# Patient Record
Sex: FEMALE | Race: WHITE | NOT HISPANIC OR LATINO | Employment: FULL TIME | URBAN - METROPOLITAN AREA
[De-identification: names, ages, dates, MRNs, and addresses within clinical notes are randomized per-mention and may not be internally consistent; named-entity substitution may affect disease eponyms.]

---

## 2018-03-12 RX ORDER — LEVOTHYROXINE SODIUM 100 UG/1
TABLET ORAL
Qty: 90 TABLET | Refills: 3 | Status: SHIPPED | OUTPATIENT
Start: 2018-03-12 | End: 2019-04-15 | Stop reason: SDUPTHER

## 2018-04-06 ENCOUNTER — TELEPHONE (OUTPATIENT)
Dept: CARDIOLOGY | Facility: CLINIC | Age: 68
End: 2018-04-06

## 2018-04-06 RX ORDER — BUDESONIDE 0.5 MG/2ML
INHALANT ORAL
COMMUNITY
Start: 2017-10-19 | End: 2020-01-24

## 2018-04-06 RX ORDER — LANOLIN ALCOHOL/MO/W.PET/CERES
500 CREAM (GRAM) TOPICAL DAILY
COMMUNITY
Start: 2016-12-29 | End: 2019-10-23

## 2018-04-06 RX ORDER — FAMOTIDINE 20 MG/1
20 TABLET, FILM COATED ORAL
COMMUNITY
Start: 2018-02-02 | End: 2019-01-25 | Stop reason: SDUPTHER

## 2018-04-06 RX ORDER — OMEGA-3S/DHA/EPA/FISH OIL/D3 300MG-1000
2000 CAPSULE ORAL DAILY
COMMUNITY
Start: 2016-11-07

## 2018-04-06 RX ORDER — ATORVASTATIN CALCIUM 10 MG/1
10 TABLET, FILM COATED ORAL
COMMUNITY
Start: 2017-12-29 | End: 2018-12-29 | Stop reason: SDUPTHER

## 2018-04-06 RX ORDER — IPRATROPIUM BROMIDE 21 UG/1
0.03 SPRAY, METERED NASAL
COMMUNITY
Start: 2015-03-31 | End: 2018-04-13 | Stop reason: ALTCHOICE

## 2018-04-06 RX ORDER — MONTELUKAST SODIUM 10 MG/1
10 TABLET ORAL
COMMUNITY
Start: 2017-10-11 | End: 2018-11-19 | Stop reason: SDUPTHER

## 2018-04-06 RX ORDER — ALBUTEROL SULFATE 90 UG/1
INHALANT RESPIRATORY (INHALATION)
COMMUNITY
Start: 2017-09-15 | End: 2018-04-13 | Stop reason: ALTCHOICE

## 2018-04-06 RX ORDER — CLONAZEPAM 1 MG/1
1 TABLET ORAL
COMMUNITY
Start: 2017-12-29 | End: 2018-09-05 | Stop reason: SDUPTHER

## 2018-04-06 RX ORDER — EZETIMIBE 10 MG/1
10 TABLET ORAL
COMMUNITY
Start: 2016-12-29 | End: 2019-02-06 | Stop reason: SDUPTHER

## 2018-04-06 RX ORDER — ALBUTEROL SULFATE 0.83 MG/ML
SOLUTION RESPIRATORY (INHALATION)
COMMUNITY
Start: 2017-10-19 | End: 2023-06-28

## 2018-04-06 RX ORDER — LEVOTHYROXINE SODIUM 100 UG/1
CAPSULE ORAL
COMMUNITY
Start: 2017-05-23 | End: 2018-04-13 | Stop reason: ALTCHOICE

## 2018-04-06 RX ORDER — VERAPAMIL HYDROCHLORIDE 40 MG/1
40 TABLET ORAL
COMMUNITY
Start: 2017-08-15 | End: 2018-04-13 | Stop reason: ALTCHOICE

## 2018-04-06 RX ORDER — VERAPAMIL HYDROCHLORIDE 240 MG/1
240 CAPSULE, DELAYED RELEASE ORAL
COMMUNITY
Start: 2017-08-15 | End: 2019-05-23 | Stop reason: SDUPTHER

## 2018-04-06 RX ORDER — ALBUTEROL SULFATE 90 UG/1
INHALANT RESPIRATORY (INHALATION)
COMMUNITY
Start: 2016-05-05 | End: 2019-02-06 | Stop reason: SDUPTHER

## 2018-04-06 NOTE — TELEPHONE ENCOUNTER
Pt requesting appt at [facility] loc  First avail in June.   Pt requested sooner.   Thank you!

## 2018-04-13 ENCOUNTER — OFFICE VISIT (OUTPATIENT)
Dept: FAMILY MEDICINE | Facility: CLINIC | Age: 68
End: 2018-04-13
Payer: MEDICARE

## 2018-04-13 VITALS
RESPIRATION RATE: 18 BRPM | HEART RATE: 77 BPM | OXYGEN SATURATION: 96 % | DIASTOLIC BLOOD PRESSURE: 70 MMHG | WEIGHT: 138 LBS | SYSTOLIC BLOOD PRESSURE: 110 MMHG | BODY MASS INDEX: 26.06 KG/M2 | HEIGHT: 61 IN

## 2018-04-13 DIAGNOSIS — E78.5 DYSLIPIDEMIA: ICD-10-CM

## 2018-04-13 DIAGNOSIS — E03.9 ACQUIRED HYPOTHYROIDISM: ICD-10-CM

## 2018-04-13 DIAGNOSIS — Z00.00 MEDICARE ANNUAL WELLNESS VISIT, SUBSEQUENT: Primary | ICD-10-CM

## 2018-04-13 DIAGNOSIS — M81.0 AGE-RELATED OSTEOPOROSIS WITHOUT CURRENT PATHOLOGICAL FRACTURE: ICD-10-CM

## 2018-04-13 DIAGNOSIS — E72.11 HOMOCYSTINURIA (CMS/HCC): ICD-10-CM

## 2018-04-13 DIAGNOSIS — I10 ESSENTIAL HYPERTENSION: ICD-10-CM

## 2018-04-13 DIAGNOSIS — M25.50 RECURRENT JOINT PAIN: ICD-10-CM

## 2018-04-13 DIAGNOSIS — I47.10 SUPRAVENTRICULAR TACHYCARDIA (CMS/HCC): ICD-10-CM

## 2018-04-13 PROCEDURE — 93000 ELECTROCARDIOGRAM COMPLETE: CPT | Performed by: INTERNAL MEDICINE

## 2018-04-13 PROCEDURE — G0009 ADMIN PNEUMOCOCCAL VACCINE: HCPCS | Performed by: INTERNAL MEDICINE

## 2018-04-13 PROCEDURE — G0438 PPPS, INITIAL VISIT: HCPCS | Performed by: INTERNAL MEDICINE

## 2018-04-13 PROCEDURE — 99214 OFFICE O/P EST MOD 30 MIN: CPT | Mod: 25 | Performed by: INTERNAL MEDICINE

## 2018-04-13 PROCEDURE — 90732 PPSV23 VACC 2 YRS+ SUBQ/IM: CPT | Performed by: INTERNAL MEDICINE

## 2018-04-13 ASSESSMENT — ENCOUNTER SYMPTOMS
CARDIOVASCULAR NEGATIVE: 1
MUSCULOSKELETAL NEGATIVE: 1
ABDOMINAL PAIN: 0
WOUND: 0
VOMITING: 0
HEMATOLOGIC/LYMPHATIC NEGATIVE: 1
NAUSEA: 0
TROUBLE SWALLOWING: 0
SORE THROAT: 0
COUGH: 1
DIARRHEA: 0
FLANK PAIN: 0
DECREASED CONCENTRATION: 0
CONSTITUTIONAL NEGATIVE: 1
CONSTIPATION: 0
FREQUENCY: 0
DYSPHORIC MOOD: 0
EYE REDNESS: 0
DYSURIA: 0
NEUROLOGICAL NEGATIVE: 1
AGITATION: 0
SLEEP DISTURBANCE: 0

## 2018-04-13 ASSESSMENT — ACTIVITIES OF DAILY LIVING (ADL)
PATIENT'S MEMORY ADEQUATE TO SAFELY COMPLETE DAILY ACTIVITIES?: YES
ADEQUATE_TO_COMPLETE_ADL: YES

## 2018-04-13 NOTE — PROGRESS NOTES
"  Subjective     Patient ID: Kelly Meeks is a 67 y.o. female.    She is here for follow up of chronic conditions - including hyperlipidemia, allergies, hx of SVT        Review of Systems   Constitutional: Negative.    HENT: Negative for ear pain, nosebleeds, sore throat, tinnitus and trouble swallowing.    Eyes: Negative for redness and visual disturbance.   Respiratory: Positive for cough.    Cardiovascular: Negative.    Gastrointestinal: Negative for abdominal pain, constipation, diarrhea, nausea and vomiting.   Endocrine: Negative for cold intolerance and heat intolerance.   Genitourinary: Negative for dysuria, flank pain, frequency and urgency.   Musculoskeletal: Negative.    Skin: Negative for rash and wound.   Allergic/Immunologic: Positive for environmental allergies and food allergies.   Neurological: Negative.    Hematological: Negative.    Psychiatric/Behavioral: Negative for agitation, decreased concentration, dysphoric mood and sleep disturbance.       Objective     Vitals:    04/13/18 0827   BP: 110/70   Patient Position: Sitting   Pulse: 77   Resp: 18   SpO2: 96%   Weight: 62.6 kg (138 lb)   Height: 1.549 m (5' 1\")     Body mass index is 26.07 kg/m².    Physical Exam   Constitutional: She is oriented to person, place, and time. She appears well-developed. No distress.   She is here today for a complete physical   HENT:   Head: Normocephalic.   Nose: Nose normal.   Mouth/Throat: Oropharynx is clear and moist.   Eyes: Conjunctivae and EOM are normal. Pupils are equal, round, and reactive to light.   Neck: Normal range of motion. No JVD present. No thyromegaly present.   Cardiovascular: Normal rate, regular rhythm, normal heart sounds and intact distal pulses.    No murmur heard.  Pulmonary/Chest: Effort normal and breath sounds normal. No stridor. No respiratory distress. She has no wheezes. Right breast exhibits no inverted nipple, no mass, no nipple discharge, no skin change and no tenderness. Left " breast exhibits no inverted nipple, no mass, no nipple discharge, no skin change and no tenderness.   Abdominal: Soft. Bowel sounds are normal. She exhibits no distension and no mass. There is no tenderness. No hernia.   Musculoskeletal: Normal range of motion. She exhibits no edema or deformity.   Lymphadenopathy:     She has no cervical adenopathy.   Neurological: She is alert and oriented to person, place, and time. No cranial nerve deficit. Coordination normal.   Skin: Skin is warm and dry. Capillary refill takes less than 2 seconds. No rash noted.   Psychiatric: She has a normal mood and affect. Her behavior is normal. Judgment and thought content normal.       Assessment/Plan   Problem List Items Addressed This Visit     Dyslipidemia    Supraventricular tachycardia (CMS/HCC) (HCC)     Well controlled on current medications  EKG done today shows NSR    She has been taking Clonazepam to prevent the morning symptoms  Given that she is now asymptomatic I encouraged her to try and taper off of it          Relevant Orders    ECG 12 lead (Completed)    Vitamin B12    Comprehensive metabolic panel    Urinalysis with Reflex Culture    Hypothyroidism    Relevant Orders    TSH w reflex FT4      Other Visit Diagnoses     Medicare annual wellness visit, subsequent    -  Primary    Age-related osteoporosis without current pathological fracture        Relevant Orders    DEXA BONE DENSITY    Essential hypertension        Relevant Orders    CBC and Differential    Comprehensive metabolic panel    TSH w reflex FT4    Recurrent joint pain        Homocystinuria (CMS/HCC) (HCC)         Relevant Orders    Vitamin B12

## 2018-04-13 NOTE — ASSESSMENT & PLAN NOTE
Well controlled on current medications  EKG done today shows NSR    She has been taking Clonazepam to prevent the morning symptoms  Given that she is now asymptomatic I encouraged her to try and taper off of it

## 2018-05-01 ENCOUNTER — TRANSCRIBE ORDERS (OUTPATIENT)
Dept: SCHEDULING | Facility: REHABILITATION | Age: 68
End: 2018-05-01

## 2018-05-01 DIAGNOSIS — M67.951 TENDINOPATHY OF RIGHT GLUTEUS MEDIUS: ICD-10-CM

## 2018-05-01 DIAGNOSIS — M67.952 TENDINOPATHY OF LEFT GLUTEUS MEDIUS: Primary | ICD-10-CM

## 2018-05-01 DIAGNOSIS — M22.2X9 DISORDER OF PATELLOFEMORAL JOINT, UNSPECIFIED LATERALITY: ICD-10-CM

## 2018-05-24 RX ORDER — VERAPAMIL HCL 240 MG
240 TABLET, EXTENDED RELEASE ORAL DAILY
Qty: 90 TABLET | Refills: 3 | Status: SHIPPED | OUTPATIENT
Start: 2018-05-24 | End: 2019-04-02

## 2018-09-05 RX ORDER — CLONAZEPAM 1 MG/1
TABLET ORAL
Qty: 90 TABLET | Refills: 2 | Status: SHIPPED | OUTPATIENT
Start: 2018-09-05 | End: 2019-03-14 | Stop reason: SDUPTHER

## 2018-11-19 RX ORDER — MONTELUKAST SODIUM 10 MG/1
10 TABLET ORAL NIGHTLY
Qty: 90 TABLET | Refills: 3 | Status: SHIPPED | OUTPATIENT
Start: 2018-11-19 | End: 2019-11-04 | Stop reason: SDUPTHER

## 2018-12-03 RX ORDER — VERAPAMIL HYDROCHLORIDE 40 MG/1
TABLET ORAL
Qty: 90 TABLET | Refills: 3 | Status: SHIPPED | OUTPATIENT
Start: 2018-12-03 | End: 2020-08-17

## 2018-12-31 RX ORDER — ATORVASTATIN CALCIUM 10 MG/1
TABLET, FILM COATED ORAL
Qty: 90 TABLET | Refills: 3 | Status: SHIPPED | OUTPATIENT
Start: 2018-12-31 | End: 2019-10-23

## 2019-01-25 ENCOUNTER — TELEPHONE (OUTPATIENT)
Dept: CARDIOLOGY | Facility: CLINIC | Age: 69
End: 2019-01-25

## 2019-01-25 ENCOUNTER — TELEPHONE (OUTPATIENT)
Dept: SCHEDULING | Facility: CLINIC | Age: 69
End: 2019-01-25

## 2019-01-25 NOTE — TELEPHONE ENCOUNTER
Patient of Dr Rosado Pharmacist from [facility] calling. Requesting refill zetia. Patient last seen in office 12/29/16 Patient has seen pcp so will ask PCP to refill zetia Thank you    Instructed pharmacist to call back if cannot get refilled PCP thank you

## 2019-01-25 NOTE — TELEPHONE ENCOUNTER
Pt calling to make a follow up apt.  Pt needs to be seen to get refills on her medication.   [phone]

## 2019-01-28 RX ORDER — FAMOTIDINE 20 MG/1
TABLET, FILM COATED ORAL
Qty: 90 TABLET | Refills: 3 | Status: SHIPPED | OUTPATIENT
Start: 2019-01-28 | End: 2019-11-04 | Stop reason: SDUPTHER

## 2019-01-31 RX ORDER — EZETIMIBE 10 MG/1
10 TABLET ORAL
OUTPATIENT
Start: 2019-01-31

## 2019-01-31 NOTE — TELEPHONE ENCOUNTER
Dr. Saucedo patient.    Patient last seen in 12/2016 and she is out of Zetia.  Chart review reflects that patient requested a refill on her Zetia 1/25/19 and she was directed to contact her PCP for refill since she hasn't been seen in over 2 years. Patient did not follow through with recommendation to ask her PCP to refill her Zetia. I encouraged her to do so however she is not satisfied with that response. She feels Dr. Saucedo should refill this medication since he was the initial prescriber. I told her I would route this note to you to see if you're willing to refill this. She does have an appointment scheduled but it's not until 4/2/19. She asked about lab work prior to her visit but it appears this was addressed in a 1/25/19 telephone note-lab slip mailed on 1/28/19 by Madelyn Mullins.  Patient requesting a return call at [phone], thanks.

## 2019-02-01 NOTE — TELEPHONE ENCOUNTER
Pt called back to speak with Fernanda and pt also wants to speak with Dr. Saucedo     Pt can be reached at [phone]

## 2019-02-06 ENCOUNTER — TELEPHONE (OUTPATIENT)
Dept: SCHEDULING | Facility: CLINIC | Age: 69
End: 2019-02-06

## 2019-02-06 ENCOUNTER — OFFICE VISIT (OUTPATIENT)
Dept: FAMILY MEDICINE | Facility: CLINIC | Age: 69
End: 2019-02-06
Payer: MEDICARE

## 2019-02-06 VITALS
HEART RATE: 76 BPM | TEMPERATURE: 98.4 F | WEIGHT: 142.4 LBS | OXYGEN SATURATION: 97 % | BODY MASS INDEX: 26.91 KG/M2 | DIASTOLIC BLOOD PRESSURE: 80 MMHG | SYSTOLIC BLOOD PRESSURE: 112 MMHG

## 2019-02-06 DIAGNOSIS — R21 RASH: Primary | ICD-10-CM

## 2019-02-06 DIAGNOSIS — F41.9 ANXIETY: ICD-10-CM

## 2019-02-06 DIAGNOSIS — D22.9 ATYPICAL NEVUS: ICD-10-CM

## 2019-02-06 PROCEDURE — 99214 OFFICE O/P EST MOD 30 MIN: CPT | Performed by: INTERNAL MEDICINE

## 2019-02-06 RX ORDER — EZETIMIBE 10 MG/1
10 TABLET ORAL DAILY
Qty: 90 TABLET | Refills: 3 | Status: SHIPPED | OUTPATIENT
Start: 2019-02-06 | End: 2019-04-02 | Stop reason: SDUPTHER

## 2019-02-06 RX ORDER — CLOBETASOL PROPIONATE 0.5 MG/G
AEROSOL, FOAM TOPICAL 2 TIMES DAILY
Qty: 50 G | Refills: 3 | Status: SHIPPED | OUTPATIENT
Start: 2019-02-06 | End: 2019-10-23

## 2019-02-06 RX ORDER — ALBUTEROL SULFATE 90 UG/1
2 INHALANT RESPIRATORY (INHALATION) EVERY 6 HOURS PRN
Qty: 1 INHALER | Refills: 3 | Status: SHIPPED | OUTPATIENT
Start: 2019-02-06 | End: 2019-11-04 | Stop reason: SDUPTHER

## 2019-02-06 ASSESSMENT — ENCOUNTER SYMPTOMS
UNEXPECTED WEIGHT CHANGE: 1
FATIGUE: 1

## 2019-02-06 ASSESSMENT — PAIN SCALES - GENERAL: PAINLEVEL: 0-NO PAIN

## 2019-02-06 NOTE — ASSESSMENT & PLAN NOTE
"She is trying to decrease the Clonazepam dose- encouraged her to pursue as a likely cause of her \"brain fog\"   "

## 2019-02-06 NOTE — PROGRESS NOTES
"  Subjective     Patient ID: Kelly Meeks is a 68 y.o. female.    She is here with several concerns including a rash and itching on her back  She has some \"brain fog\" and has been on a keto diet and has gained weight   Has GI symptoms         Review of Systems   Constitutional: Positive for fatigue and unexpected weight change.        \"brain fog\"   Gastrointestinal:        Flatulance   Skin:        Tender scalp       Objective     Vitals:    02/06/19 1546   BP: 112/80   BP Location: Left upper arm   Patient Position: Sitting   Pulse: 76   Temp: 36.9 °C (98.4 °F)   SpO2: 97%   Weight: 64.6 kg (142 lb 6.4 oz)     Body mass index is 26.91 kg/m².    Physical Exam   Constitutional: She is oriented to person, place, and time. She appears well-developed. No distress.   HENT:   Head: Normocephalic.   Mouth/Throat: Oropharynx is clear and moist.   Eyes: Conjunctivae are normal. No scleral icterus.   Neck: Normal range of motion. No thyromegaly present.   Cardiovascular: Normal rate, regular rhythm and normal heart sounds.    No murmur heard.  Pulmonary/Chest: Effort normal and breath sounds normal. She has no wheezes.   Musculoskeletal: She exhibits no edema.   Lymphadenopathy:     She has no cervical adenopathy.   Neurological: She is alert and oriented to person, place, and time.   Skin: Skin is warm and dry. Rash noted.        Psychiatric: She has a normal mood and affect. Her behavior is normal.   Nursing note and vitals reviewed.      Assessment/Plan   Problem List Items Addressed This Visit     Anxiety     She is trying to decrease the Clonazepam dose- encouraged her to pursue as a likely cause of her \"brain fog\"            Other Visit Diagnoses     Rash    -  Primary    reassured, sent in steroid foam, advised to go to derm for atypical nevus     Relevant Medications    clobetasol (OLUX) 0.05 % topical foam    Atypical nevus        Relevant Medications    clobetasol (OLUX) 0.05 % topical foam            "

## 2019-02-07 DIAGNOSIS — E78.5 DYSLIPIDEMIA: Primary | ICD-10-CM

## 2019-03-14 RX ORDER — CLONAZEPAM 1 MG/1
TABLET ORAL
Qty: 90 TABLET | Refills: 2 | Status: SHIPPED | OUTPATIENT
Start: 2019-03-14 | End: 2019-12-30 | Stop reason: SDUPTHER

## 2019-03-28 ENCOUNTER — TELEPHONE (OUTPATIENT)
Dept: FAMILY MEDICINE | Facility: CLINIC | Age: 69
End: 2019-03-28

## 2019-03-28 DIAGNOSIS — Z78.9 HISTORY OF MEASLES, MUMPS, RUBELLA (MMR) VACCINATION UNKNOWN: Primary | ICD-10-CM

## 2019-03-28 NOTE — TELEPHONE ENCOUNTER
please advise if you would recommend pt just getting the booster or if you would advise having titers to check for immunity. Pt ask to have your recommendations before proceeding with anything.     Tks!

## 2019-03-28 NOTE — TELEPHONE ENCOUNTER
Patient stated she is concerned about getting the mumps again, she had them as a child.  She doesn't believe she ever received the vaccine. Patient requested the injection rather than the titer.  Patient asked if this is what you would recommend.  Please advise and I will inform patient. [facility]  [phone]

## 2019-04-01 NOTE — PROGRESS NOTES
Patient MRN: [id number]  Date: 2016  Description: Prog Notes ([facility]) Cardiology  Category: Progress Notes ([facility])   Provider ID: [id number]  Practice ID: [id number]  Bulan ID: [id number]    2019      Melonie Guy M.D.  [address]  [address]  [place] [address]    Re:  ARABELLA MEEKS  :  1950    Dear Melonie:    It was my pleasure to see your patient, Arabella Meeks, in the [facility] today.  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    Clinically, she has been doing well.  She denies any chest pain, shortness of breath or palpitations.    As you know, she has undergone previous cardiac workup.  Her coronary calcium score was 0 in May 2016.  She did have a moderate amount of calcification involving in the thoracic aorta as well as the carotid distribution.  Given her family history of a father who had a myocardial infarction in his 30's and a mother who had a stroke at age 72, we elected to begin primary prevention therapy.    Her initial lipid panel revealed a total cholesterol of 228, , HDL 62 and triglycerides 102.  She initiated on combination therapy with atorvastatin 10 mg alternating with Zetia 10 mg.  Her total cholesterol is down to 208, , apoB 99, LDL-P 1197, a reasonable response to this therapy.  Of note, her LP(a) was elevated at 420.  This prompted a discussion and we will begin niacin with Endur-Acin 500 mg at the evening meal.    Over the past year the patient has been on the keto diet.  She is markedly increased her saturated fats.  Her repeat lipid panel reflects this increased saturated fat intake.  Total cholesterol 218  HDL 73 triglycerides 73 APO B 121 and LDL P 1568.  I did a lengthy discussion today concerning cutting back on her saturated fat intake.  She needs to follow a low carbohydrate low saturated fat Mediterranean diet and increase her exercise program.  She is agreeable to  this nutrition recommendation.    At this time recommending that she undergo a repeat coronary calcium score.    PAST MEDICAL HISTORY:    Coronary calcium score 0, May 2016.    Stress echo in 2009 was a normal study.    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    PAST SURGICAL HISTORY:  None.    CURRENT MEDICATIONS:      Current Outpatient Prescriptions:   •  albuterol 2.5 mg /3 mL (0.083 %) nebulizer solution, inhale 3 milliliter by nebulization route 3 times every day, Disp: , Rfl:   •  albuterol HFA (PROAIR HFA) 90 mcg/actuation inhaler, Inhale 2 puffs every 6 (six) hours as needed for wheezing., Disp: 1 Inhaler, Rfl: 3  •  atorvastatin (LIPITOR) 10 mg tablet, TAKE ONE TABLET BY MOUTH DAILY (Patient taking differently: TAKE ONE TABLET BY MOUTH    # times per week), Disp: 90 tablet, Rfl: 3  •  budesonide (PULMICORT) 0.5 mg/2 mL nebulizer solution, inhale 2 milliliter by nebulization route 3 times every day, Disp: , Rfl:   •  cholecalciferol, vitamin D3, (VITAMIN D3) 2,000 unit tablet, No SIG Entered, Disp: , Rfl:   •  clobetasol (OLUX) 0.05 % topical foam, Apply topically 2 (two) times a day., Disp: 50 g, Rfl: 3  •  clonazePAM (klonoPIN) 1 mg tablet, TAKE ONE TABLET BY MOUTH DAILY, Disp: 90 tablet, Rfl: 2  •  DOCOSAHEXANOIC ACID/EPA (FISH OIL ORAL), take  atotal 3,000  daily, Disp: , Rfl:   •  estrogens, conjugated (CONJUGATED ESTROGENS VAGL), Insert into the vagina., Disp: , Rfl:   •  ezetimibe (ZETIA) 10 mg tablet, Take 1 tablet (10 mg total) by mouth daily. (Patient taking differently: Take 10 mg by mouth 3 (three) times a week (Mon, Wed, Fri).  ), Disp: 90 tablet, Rfl: 3  •  levothyroxine (SYNTHROID) 100 mcg tablet, TAKE ONE TABLET BY MOUTH EVERY DAY, Disp: 90 tablet, Rfl: 3  •  melatonin 5 mg capsule, Take by mouth., Disp: , Rfl:   •  montelukast  (SINGULAIR) 10 mg tablet, Take 1 tablet (10 mg total) by mouth nightly., Disp: 90 tablet, Rfl: 3  •  niacin (ENDUR-ACIN) 500 mg CR tablet, 500 mg., Disp: , Rfl:   •  verapamil (CALAN) 40 mg tablet, TAKE ONE TABLET BY MOUTH ONCE DAILY WHEN NEEDED, Disp: 90 tablet, Rfl: 3  •  verapamil (VERELAN) 240 mg 24 hr capsule, 240 mg., Disp: , Rfl:   •  famotidine (PEPCID) 20 mg tablet, TAKE ONE TABLET BY MOUTH DAILY (Patient not taking: Reported on 4/2/2019), Disp: 90 tablet, Rfl: 3  •  verapamil SR (CALAN-SR) 240 mg CR tablet, Take 1 tablet (240 mg total) by mouth daily. (Patient not taking: Reported on 4/2/2019 ), Disp: 90 tablet, Rfl: 3      SUPPLEMENTS:  Omega-3 fish oil, vitamin-D, coenzyme-Q10, Reservitol, curcumin, per Dr. Adrian Yang's recommendations, Endur-Acin 500 mg at dinner.    ALLERGIES:  High-dose statins.  Lipitor 20 mg daily caused significant myalgias.  We are going to attempt reintroducing 10 mg Monday, Wednesday, Friday.    FAMILY HISTORY:  Father had multiple myocardial infarctions in his 30's.  Mother with possible coronary artery disease and stroke.  Brother with hypertension.    SOCIAL HISTORY:  The patient lives with her adopted daughter.  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  Occasional glass of wine.  She quit smoking 15 years ago.    REVIEW OF SYSTEMS:  The patient's palpitations have been well controlled with the use of Verelan.  She follows with Dr. Ofelia Muse.  She has had difficulty with taking high-dose statins as they lead to symptoms of myalgias and leg pain.  She has a history of asthma and allergies.    PHYSICAL EXAMINATION:  The patient is a middle-aged female in no acute distress.    Weight:  143.  Blood pressure: 98/78  Heart rate:  70.  Temperature: Afebrile.  HEENT:  Unremarkable.  No xanthelasma or arcus.  Neck:  Supple, no JVD.  Lungs:  Clear to auscultation and percussion.  Cardiac:  Regular rate and rhythm without  murmur or gallop.  Abdomen:  Soft, bowel sounds present, no organomegaly.  Extremities:  No edema, pulses intact.  Skin:  Warm and dry.  Neuro:  Alert and oriented X 3.    LABORATORY DATA:      Coronary score:  0, 2016, thoracic aortic calcification.      Advanced lipid panel March 2019:  Total cholesterol 218  HDL 73 triglycerides 73 APO B 121.  LP(a) 473.  Inflammation panel: Normal  Endothelial function panel: Abnormal  Metabolic panel: Vitamin D 69  Sterol panel: Hyper absorber of cholesterol.  Normal synthesizer of cholesterol.  Diabetes panel: Insulin resistance  Omega-3 index: 9.1    IMPRESSIONS/RECOMMENDATIONS:  1. Cardiovascular risk assessment.  The patient's coronary calcium score is 0 which places her in a low risk category.  We will repeat her calcium score this year.  2. Thoracic aortic and carotid arterial calcification.  The patient is at risk for progressive atherosclerosis.  3. Polygenic hypercholesterolemia.  The patient will continue Lipitor 10 mg Monday, Wednesday and Friday and Zetia 10 mg on alternating days.  Our goal is an LDL cholesterol less than 100.  He will return back to a low carbohydrate low saturated fat Mediterranean diet.  She was no longer follow the keto diet.  4. Elevated LP(a).  The patient will begin Endur-Acin 500 mg at dinner.  5. Vitamin-D deficiency.  Continue vitamin-D supplementation.  6. Statin intolerance.  7. Hyperabsorber of cholesterol.  Continue Zetia.  8. Hypothyroidism.  Continue Synthroid.    Summary: We will see Kelly back in the office in six months.  She will discontinue her keto diet.  She needs to think about intermittent fasting to be used in conjunction with a low carbohydrate low saturated fat Mediterranean diet.    Sincerely,    Nilson Saucedo MD  4/2/2019      cc: Mleonie Guy M.D., [address] [place] [address], [address], [place] [address], FAX: [phone]

## 2019-04-02 ENCOUNTER — OFFICE VISIT (OUTPATIENT)
Dept: CARDIOLOGY | Facility: CLINIC | Age: 69
End: 2019-04-02
Payer: MEDICARE

## 2019-04-02 VITALS
BODY MASS INDEX: 26.31 KG/M2 | DIASTOLIC BLOOD PRESSURE: 78 MMHG | WEIGHT: 143 LBS | HEIGHT: 62 IN | SYSTOLIC BLOOD PRESSURE: 98 MMHG

## 2019-04-02 DIAGNOSIS — F41.9 ANXIETY: ICD-10-CM

## 2019-04-02 DIAGNOSIS — E78.5 DYSLIPIDEMIA: ICD-10-CM

## 2019-04-02 DIAGNOSIS — E78.00 PURE HYPERCHOLESTEROLEMIA: ICD-10-CM

## 2019-04-02 DIAGNOSIS — I25.10 CORONARY ARTERY DISEASE INVOLVING NATIVE CORONARY ARTERY OF NATIVE HEART WITHOUT ANGINA PECTORIS: Primary | ICD-10-CM

## 2019-04-02 PROCEDURE — 99214 OFFICE O/P EST MOD 30 MIN: CPT | Performed by: INTERNAL MEDICINE

## 2019-04-02 PROCEDURE — 93000 ELECTROCARDIOGRAM COMPLETE: CPT | Performed by: INTERNAL MEDICINE

## 2019-04-02 RX ORDER — EZETIMIBE 10 MG/1
10 TABLET ORAL 3 TIMES WEEKLY
Qty: 40 TABLET | Refills: 1 | Status: SHIPPED | OUTPATIENT
Start: 2019-04-03 | End: 2019-11-04 | Stop reason: SDUPTHER

## 2019-04-02 RX ORDER — ACETAMINOPHEN 500 MG
TABLET ORAL NIGHTLY
COMMUNITY
End: 2022-06-01

## 2019-04-02 NOTE — LETTER
2019     Melonie Guy MD  [address]  [address]  [facility] [address]    Patient: Kelly Meeks   YOB: 1950   Date of Visit: 2019       Dear Dr. Guy:    Thank you for referring Kelly Meeks to me for evaluation. Below are my notes for this consultation.    If you have questions, please do not hesitate to call me. I look forward to following your patient along with you.         Sincerely,        Nilson Saucedo MD        CC: No Recipients  Leana Evangelista CRNP  2019  3:27 PM  Sign at close encounter  Patient MRN: [id number]  Date: 2016  Description: Prog Notes ([facility]) Cardiology  Category: Progress Notes ([facility])   Provider ID: [id number]  Practice ID: 0001  [place] ID: [id number]    2019      Melonie Guy M.D.  [address] [facility]  [address]  [place] [address]    Re:  KELLY MEEKS  :  1950    Dear Melonie:    It was my pleasure to see your patient, Kelly Meeks, in the [facility] today.  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    Clinically, she has been doing well.  She denies any chest pain, shortness of breath or palpitations.    As you know, she has undergone previous cardiac workup.  Her coronary calcium score was 0 in May 2016.  She did have a moderate amount of calcification involving in the thoracic aorta as well as the carotid distribution.  Given her family history of a father who had a myocardial infarction in his 30's and a mother who had a stroke at age 72, we elected to begin primary prevention therapy.    Her initial lipid panel revealed a total cholesterol of 228, , HDL 62 and triglycerides 102.  She initiated on combination therapy with atorvastatin 10 mg alternating with Zetia 10 mg.  Her total cholesterol is down to 208, , apoB 99, LDL-P 1197, a reasonable response to this therapy.  Of note, her LP(a) was elevated at 420.  This prompted a  discussion and we will begin niacin with Endur-Acin 500 mg at the evening meal.    Over the past year the patient has been on the keto diet.  She is markedly increased her saturated fats.  Her repeat lipid panel reflects this increased saturated fat intake.  Total cholesterol 218  HDL 73 triglycerides 73 APO B 121 and LDL P 1568.  I did a lengthy discussion today concerning cutting back on her saturated fat intake.  She needs to follow a low carbohydrate low saturated fat Mediterranean diet and increase her exercise program.  She is agreeable to this nutrition recommendation.    At this time recommending that she undergo a repeat coronary calcium score.    PAST MEDICAL HISTORY:    Coronary calcium score 0, May 2016.    Stress echo in 2009 was a normal study.    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    PAST SURGICAL HISTORY:  None.    CURRENT MEDICATIONS:      Current Outpatient Prescriptions:   •  albuterol 2.5 mg /3 mL (0.083 %) nebulizer solution, inhale 3 milliliter by nebulization route 3 times every day, Disp: , Rfl:   •  albuterol HFA (PROAIR HFA) 90 mcg/actuation inhaler, Inhale 2 puffs every 6 (six) hours as needed for wheezing., Disp: 1 Inhaler, Rfl: 3  •  atorvastatin (LIPITOR) 10 mg tablet, TAKE ONE TABLET BY MOUTH DAILY (Patient taking differently: TAKE ONE TABLET BY MOUTH    # times per week), Disp: 90 tablet, Rfl: 3  •  budesonide (PULMICORT) 0.5 mg/2 mL nebulizer solution, inhale 2 milliliter by nebulization route 3 times every day, Disp: , Rfl:   •  cholecalciferol, vitamin D3, (VITAMIN D3) 2,000 unit tablet, No SIG Entered, Disp: , Rfl:   •  clobetasol (OLUX) 0.05 % topical foam, Apply topically 2 (two) times a day., Disp: 50 g, Rfl: 3  •  clonazePAM (klonoPIN) 1 mg tablet, TAKE ONE TABLET BY MOUTH DAILY, Disp: 90 tablet, Rfl:  2  •  DOCOSAHEXANOIC ACID/EPA (FISH OIL ORAL), take  atotal 3,000  daily, Disp: , Rfl:   •  estrogens, conjugated (CONJUGATED ESTROGENS VAGL), Insert into the vagina., Disp: , Rfl:   •  ezetimibe (ZETIA) 10 mg tablet, Take 1 tablet (10 mg total) by mouth daily. (Patient taking differently: Take 10 mg by mouth 3 (three) times a week (Mon, Wed, Fri).  ), Disp: 90 tablet, Rfl: 3  •  levothyroxine (SYNTHROID) 100 mcg tablet, TAKE ONE TABLET BY MOUTH EVERY DAY, Disp: 90 tablet, Rfl: 3  •  melatonin 5 mg capsule, Take by mouth., Disp: , Rfl:   •  montelukast (SINGULAIR) 10 mg tablet, Take 1 tablet (10 mg total) by mouth nightly., Disp: 90 tablet, Rfl: 3  •  niacin (ENDUR-ACIN) 500 mg CR tablet, 500 mg., Disp: , Rfl:   •  verapamil (CALAN) 40 mg tablet, TAKE ONE TABLET BY MOUTH ONCE DAILY WHEN NEEDED, Disp: 90 tablet, Rfl: 3  •  verapamil (VERELAN) 240 mg 24 hr capsule, 240 mg., Disp: , Rfl:   •  famotidine (PEPCID) 20 mg tablet, TAKE ONE TABLET BY MOUTH DAILY (Patient not taking: Reported on 4/2/2019), Disp: 90 tablet, Rfl: 3  •  verapamil SR (CALAN-SR) 240 mg CR tablet, Take 1 tablet (240 mg total) by mouth daily. (Patient not taking: Reported on 4/2/2019 ), Disp: 90 tablet, Rfl: 3      SUPPLEMENTS:  Omega-3 fish oil, vitamin-D, coenzyme-Q10, Reservitol, curcumin, per Dr. Adrian Yang's recommendations, Endur-Acin 500 mg at dinner.    ALLERGIES:  High-dose statins.  Lipitor 20 mg daily caused significant myalgias.  We are going to attempt reintroducing 10 mg Monday, Wednesday, Friday.    FAMILY HISTORY:  Father had multiple myocardial infarctions in his 30's.  Mother with possible coronary artery disease and stroke.  Brother with hypertension.    SOCIAL HISTORY:  The patient lives with her adopted daughter.  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  Occasional glass of wine.  She quit smoking 15 years ago.    REVIEW OF SYSTEMS:  The patient's palpitations have been well  controlled with the use of Verelan.  She follows with Dr. Ofelia Muse.  She has had difficulty with taking high-dose statins as they lead to symptoms of myalgias and leg pain.  She has a history of asthma and allergies.    PHYSICAL EXAMINATION:  The patient is a middle-aged female in no acute distress.    Weight:  143.  Blood pressure: 98/78  Heart rate:  70.  Temperature: Afebrile.  HEENT:  Unremarkable.  No xanthelasma or arcus.  Neck:  Supple, no JVD.  Lungs:  Clear to auscultation and percussion.  Cardiac:  Regular rate and rhythm without murmur or gallop.  Abdomen:  Soft, bowel sounds present, no organomegaly.  Extremities:  No edema, pulses intact.  Skin:  Warm and dry.  Neuro:  Alert and oriented X 3.    LABORATORY DATA:      Coronary score:  0, 2016, thoracic aortic calcification.      Advanced lipid panel March 2019:  Total cholesterol 218  HDL 73 triglycerides 73 APO B 121.  LP(a) 473.  Inflammation panel: Normal  Endothelial function panel: Abnormal  Metabolic panel: Vitamin D 69  Sterol panel: Hyper absorber of cholesterol.  Normal synthesizer of cholesterol.  Diabetes panel: Insulin resistance  Omega-3 index: 9.1    IMPRESSIONS/RECOMMENDATIONS:  1. Cardiovascular risk assessment.  The patient's coronary calcium score is 0 which places her in a low risk category.  We will repeat her calcium score this year.  2. Thoracic aortic and carotid arterial calcification.  The patient is at risk for progressive atherosclerosis.  3. Polygenic hypercholesterolemia.  The patient will continue Lipitor 10 mg Monday, Wednesday and Friday and Zetia 10 mg on alternating days.  Our goal is an LDL cholesterol less than 100.  He will return back to a low carbohydrate low saturated fat Mediterranean diet.  She was no longer follow the keto diet.  4. Elevated LP(a).  The patient will begin Endur-Acin 500 mg at dinner.  5. Vitamin-D deficiency.  Continue vitamin-D supplementation.  6. Statin  intolerance.  7. Hyperabsorber of cholesterol.  Continue Zetia.  8. Hypothyroidism.  Continue Synthroid.    Summary: We will see Kelly back in the office in six months.  She will discontinue her keto diet.  She needs to think about intermittent fasting to be used in conjunction with a low carbohydrate low saturated fat Mediterranean diet.    Sincerely,    Nilson Saucedo MD  4/2/2019      cc: Melonie Guy M.D., [address] [place] [address], [address], [place] [address], FAX: [phone]

## 2019-04-15 RX ORDER — LEVOTHYROXINE SODIUM 100 UG/1
TABLET ORAL
Qty: 90 TABLET | Refills: 3 | Status: SHIPPED | OUTPATIENT
Start: 2019-04-15 | End: 2020-04-21

## 2019-04-15 NOTE — TELEPHONE ENCOUNTER
Sure script request. LOV 4/13/18 Labs 3/21/19      Ladies please can one of you assist pt with scheduling for a physical appointment. Tks!

## 2019-04-17 LAB
MEV IGG SER IA-ACNC: >300 AU/ML
MUV IGG SER IA-ACNC: >300 AU/ML
RUBV IGG SERPL IA-ACNC: 5.05 INDEX

## 2019-06-03 ENCOUNTER — HOSPITAL ENCOUNTER (OUTPATIENT)
Dept: RADIOLOGY | Facility: HOSPITAL | Age: 69
Discharge: HOME | End: 2019-06-03
Attending: INTERNAL MEDICINE
Payer: MEDICARE

## 2019-06-03 DIAGNOSIS — I25.10 CORONARY ARTERY DISEASE INVOLVING NATIVE CORONARY ARTERY OF NATIVE HEART WITHOUT ANGINA PECTORIS: ICD-10-CM

## 2019-06-03 PROCEDURE — 75571 CT HRT W/O DYE W/CA TEST: CPT

## 2019-10-03 ENCOUNTER — TELEPHONE (OUTPATIENT)
Dept: FAMILY MEDICINE | Facility: CLINIC | Age: 69
End: 2019-10-03

## 2019-10-03 NOTE — TELEPHONE ENCOUNTER
"Patient called and stated that she wants a new medication for the famotidine   I asked why and she stated that she doesn't want to be on any medication that ends with \"dine\"   Wants another medication sent to [facility]   "

## 2019-10-04 NOTE — TELEPHONE ENCOUNTER
Please let her know both Zantac and Tagamet work similarly so her other option is to take something like Nexium

## 2019-10-17 ENCOUNTER — APPOINTMENT (OUTPATIENT)
Dept: LAB | Facility: HOSPITAL | Age: 69
End: 2019-10-17
Attending: NURSE PRACTITIONER
Payer: MEDICARE

## 2019-10-17 DIAGNOSIS — E78.5 DYSLIPIDEMIA: ICD-10-CM

## 2019-10-17 LAB
ALBUMIN SERPL-MCNC: 3.7 G/DL (ref 3.4–5)
ALP SERPL-CCNC: 58 IU/L (ref 35–126)
ALT SERPL-CCNC: 35 IU/L (ref 11–54)
ANION GAP SERPL CALC-SCNC: 8 MEQ/L (ref 3–15)
AST SERPL-CCNC: 31 IU/L (ref 15–41)
BILIRUB SERPL-MCNC: 0.6 MG/DL (ref 0.3–1.2)
BUN SERPL-MCNC: 14 MG/DL (ref 8–20)
CALCIUM SERPL-MCNC: 9.4 MG/DL (ref 8.9–10.3)
CHLORIDE SERPL-SCNC: 105 MEQ/L (ref 98–109)
CHOLEST SERPL-MCNC: 227 MG/DL
CO2 SERPL-SCNC: 28 MEQ/L (ref 22–32)
CREAT SERPL-MCNC: 0.8 MG/DL
GFR SERPL CREATININE-BSD FRML MDRD: >60 ML/MIN/1.73M*2
GLUCOSE SERPL-MCNC: 85 MG/DL (ref 70–99)
HDLC SERPL-MCNC: 75 MG/DL
HDLC SERPL: 3 {RATIO}
LDLC SERPL CALC-MCNC: 138 MG/DL
NONHDLC SERPL-MCNC: 152 MG/DL
POTASSIUM SERPL-SCNC: 4.2 MEQ/L (ref 3.6–5.1)
PROT SERPL-MCNC: 6.1 G/DL (ref 6–8.2)
SODIUM SERPL-SCNC: 141 MEQ/L (ref 136–144)
TRIGL SERPL-MCNC: 70 MG/DL (ref 30–149)

## 2019-10-17 PROCEDURE — 36415 COLL VENOUS BLD VENIPUNCTURE: CPT

## 2019-10-17 PROCEDURE — 80053 COMPREHEN METABOLIC PANEL: CPT

## 2019-10-17 PROCEDURE — 80061 LIPID PANEL: CPT

## 2019-10-21 ENCOUNTER — TELEPHONE (OUTPATIENT)
Dept: FAMILY MEDICINE | Facility: CLINIC | Age: 69
End: 2019-10-21

## 2019-10-21 DIAGNOSIS — J45.909 ASTHMA, UNSPECIFIED ASTHMA SEVERITY, UNSPECIFIED WHETHER COMPLICATED, UNSPECIFIED WHETHER PERSISTENT: ICD-10-CM

## 2019-10-21 DIAGNOSIS — I10 ESSENTIAL HYPERTENSION: ICD-10-CM

## 2019-10-21 DIAGNOSIS — M81.0 OSTEOPOROSIS, UNSPECIFIED OSTEOPOROSIS TYPE, UNSPECIFIED PATHOLOGICAL FRACTURE PRESENCE: ICD-10-CM

## 2019-10-21 DIAGNOSIS — F41.9 ANXIETY: Primary | ICD-10-CM

## 2019-10-21 DIAGNOSIS — Z11.59 NEED FOR HEPATITIS C SCREENING TEST: ICD-10-CM

## 2019-10-21 DIAGNOSIS — E03.9 ACQUIRED HYPOTHYROIDISM: ICD-10-CM

## 2019-10-21 DIAGNOSIS — R30.0 DYSURIA: ICD-10-CM

## 2019-10-21 DIAGNOSIS — E78.00 PURE HYPERCHOLESTEROLEMIA: ICD-10-CM

## 2019-10-21 DIAGNOSIS — E78.5 DYSLIPIDEMIA: ICD-10-CM

## 2019-10-23 ENCOUNTER — OFFICE VISIT (OUTPATIENT)
Dept: CARDIOLOGY | Facility: CLINIC | Age: 69
End: 2019-10-23
Payer: MEDICARE

## 2019-10-23 VITALS
WEIGHT: 143 LBS | BODY MASS INDEX: 26.31 KG/M2 | SYSTOLIC BLOOD PRESSURE: 132 MMHG | HEIGHT: 62 IN | DIASTOLIC BLOOD PRESSURE: 68 MMHG

## 2019-10-23 DIAGNOSIS — E78.5 DYSLIPIDEMIA: Primary | ICD-10-CM

## 2019-10-23 DIAGNOSIS — E78.00 PURE HYPERCHOLESTEROLEMIA: ICD-10-CM

## 2019-10-23 DIAGNOSIS — E78.41 ELEVATED LP(A): ICD-10-CM

## 2019-10-23 DIAGNOSIS — I65.23 ARTERIOSCLEROSIS OF BOTH CAROTID ARTERIES: ICD-10-CM

## 2019-10-23 PROCEDURE — 99214 OFFICE O/P EST MOD 30 MIN: CPT | Performed by: INTERNAL MEDICINE

## 2019-10-23 RX ORDER — ROSUVASTATIN CALCIUM 5 MG/1
5 TABLET, COATED ORAL 3 TIMES WEEKLY
Qty: 90 TABLET | Refills: 3 | Status: SHIPPED | OUTPATIENT
Start: 2019-10-23 | End: 2020-12-29

## 2019-10-23 RX ORDER — LANOLIN ALCOHOL/MO/W.PET/CERES
1000 CREAM (GRAM) TOPICAL NIGHTLY
Qty: 180 TABLET | Refills: 3 | Status: SHIPPED | OUTPATIENT
Start: 2019-10-23 | End: 2025-01-08

## 2019-10-23 NOTE — LETTER
2019     Melonie Guy MD  [address]  [address]  [facility] [address]    Patient: Kelly Meeks  YOB: 1950  Date of Visit: 10/23/2019      Dear Dr. Guy:    Thank you for referring Kelly Meeks to me for evaluation. Below are my notes for this consultation.    If you have questions, please do not hesitate to call me. I look forward to following your patient along with you.         Sincerely,        Nilson Saucedo MD        CC: No Recipients  Nilson Saucedo MD  10/23/2019  5:18 PM  Addendum  Patient MRN: [id number]  Date: 2016  Description: Prog Notes ([facility]) Cardiology  Category: Progress Notes ([facility])   Provider ID: [id number]  Practice ID: 0001  [place] ID: [id number]    10/23/2019      Melonie Guy M.D.  [address] [facility]  [address]  [place] [address]    Re:  KELLY MEEKS  :  1950    Dear Melonie:    It was my pleasure to see your patient, Kelly Meeks, in the [facility] today.  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    Clinically, she has been doing well.  She denies any chest pain, shortness of breath or palpitations.    As you know, she has undergone previous cardiac workup.  Her coronary calcium score was 0 in May 2016.  She did have a moderate amount of calcification involving in the thoracic aorta as well as the carotid distribution.  Given her family history of a father who had a myocardial infarction in his 30's and a mother who had a stroke at age 72, we elected to begin primary prevention therapy.    Her initial lipid panel revealed a total cholesterol of 228, , HDL 62 and triglycerides 102.  She initiated on combination therapy with atorvastatin 10 mg alternating with Zetia 10 mg.  Her total cholesterol is down to 208, , apoB 99, LDL-P 1197, a reasonable response to this therapy.  Of note, her LP(a) was elevated at 420.  This prompted a  discussion and we will begin niacin with Endur-Acin 500 mg at the evening meal.    Over the past year the patient has been on the keto diet.  She is markedly increased her saturated fats.  Her repeat lipid panel reflects this increased saturated fat intake.  Total cholesterol 218  HDL 73 triglycerides 73 APO B 121 and LDL P 1568.  I did a lengthy discussion today concerning cutting back on her saturated fat intake.  She needs to follow a low carbohydrate low saturated fat Mediterranean diet and increase her exercise program.  She is agreeable to this nutrition recommendation.    At this time recommending that she undergo a repeat coronary calcium score.  Her repeat calcium score in April 2019 was 0.  She remains in a very low low risk quartile.  We discussed her prevention program in detail.  Her repeat lipid panel revealed a total cholesterol 227  HDL 75 and triglycerides 70.  We both agreed to switch her from Lipitor to Crestor at 5 mg Monday Wednesday Friday.  We also agreed to increase her Endur-Acin 2000 mg the evening meal.  Our goal is to lower her LDL cholesterol to below 130.    PAST MEDICAL HISTORY:    Coronary calcium score 0, May 2016.    Stress echo in 2009 was a normal study.    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    PAST SURGICAL HISTORY:  None.    CURRENT MEDICATIONS:      Current Outpatient Medications:   •  albuterol 2.5 mg /3 mL (0.083 %) nebulizer solution, inhale 3 milliliter by nebulization route 3 times every day, Disp: , Rfl:   •  albuterol HFA (PROAIR HFA) 90 mcg/actuation inhaler, Inhale 2 puffs every 6 (six) hours as needed for wheezing., Disp: 1 Inhaler, Rfl: 3  •  budesonide (PULMICORT) 0.5 mg/2 mL nebulizer solution, inhale 2 milliliter by nebulization route 3 times every day, Disp: , Rfl:   •  clonazePAM  (klonoPIN) 1 mg tablet, TAKE ONE TABLET BY MOUTH DAILY (Patient taking differently: Take 1 mg by mouth daily.  ), Disp: 90 tablet, Rfl: 2  •  DOCOSAHEXANOIC ACID/EPA (FISH OIL ORAL), take  atotal 3,000  daily, Disp: , Rfl:   •  estrogens, conjugated (CONJUGATED ESTROGENS VAGL), Insert into the vagina., Disp: , Rfl:   •  ezetimibe (ZETIA) 10 mg tablet, Take 1 tablet (10 mg total) by mouth 3 (three) times a week (Mon, Wed, Fri)., Disp: 40 tablet, Rfl: 1  •  famotidine (PEPCID) 20 mg tablet, TAKE ONE TABLET BY MOUTH DAILY, Disp: 90 tablet, Rfl: 3  •  levothyroxine (SYNTHROID) 100 mcg tablet, TAKE ONE TABLET BY MOUTH EVERY DAY, Disp: 90 tablet, Rfl: 3  •  melatonin 10 mg tablet, Take by mouth nightly.  , Disp: , Rfl:   •  montelukast (SINGULAIR) 10 mg tablet, Take 1 tablet (10 mg total) by mouth nightly., Disp: 90 tablet, Rfl: 3  •  niacin (ENDUR-ACIN) 500 mg CR tablet, Take 2 tablets (1,000 mg total) by mouth nightly., Disp: 180 tablet, Rfl: 3  •  verapamil (CALAN) 40 mg tablet, TAKE ONE TABLET BY MOUTH ONCE DAILY WHEN NEEDED, Disp: 90 tablet, Rfl: 3  •  verapamil (VERELAN) 240 mg 24 hr capsule, Take 1 capsule (240 mg total) by mouth nightly., Disp: 90 capsule, Rfl: 3  •  cholecalciferol, vitamin D3, (VITAMIN D3) 2,000 unit tablet,  , Disp: , Rfl:   •  rosuvastatin (CRESTOR) 5 mg tablet, Take 1 tablet (5 mg total) by mouth 3 (three) times a week (Mon, Wed, Fri)., Disp: 90 tablet, Rfl: 3      SUPPLEMENTS:  Omega-3 fish oil, vitamin-D, coenzyme-Q10, Reservitol, curcumin, per Dr. Adrian Yang's recommendations, Endur-Acin 500 mg at dinner.    ALLERGIES:  High-dose statins.  Lipitor 20 mg daily caused significant myalgias.  We are going to attempt reintroducing 10 mg Monday, Wednesday, Friday.    FAMILY HISTORY:  Father had multiple myocardial infarctions in his 30's.  Mother with possible coronary artery disease and stroke.  Brother with hypertension.    SOCIAL HISTORY:  The patient lives with her adopted daughter.   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  Occasional glass of wine.  She quit smoking 15 years ago.    REVIEW OF SYSTEMS:  The patient's palpitations have been well controlled with the use of Verelan.  She follows with Dr. Ofelia Muse.  She has had difficulty with taking high-dose statins as they lead to symptoms of myalgias and leg pain.  She has a history of asthma and allergies.    PHYSICAL EXAMINATION:  The patient is a middle-aged female in no acute distress.    Weight:  143.  Blood pressure: 98/78  Heart rate:  70.  Temperature: Afebrile.  HEENT:  Unremarkable.  No xanthelasma or arcus.  Neck:  Supple, no JVD.  Lungs:  Clear to auscultation and percussion.  Cardiac:  Regular rate and rhythm without murmur or gallop.  Abdomen:  Soft, bowel sounds present, no organomegaly.  Extremities:  No edema, pulses intact.  Skin:  Warm and dry.  Neuro:  Alert and oriented X 3.    LABORATORY DATA:      Coronary score:  0, 2016, thoracic aortic calcification.      Advanced lipid panel March 2019:  Total cholesterol 218  HDL 73 triglycerides 73 APO B 121.  LP(a) 473.  Inflammation panel: Normal  Endothelial function panel: Abnormal  Metabolic panel: Vitamin D 69  Sterol panel: Hyper absorber of cholesterol.  Normal synthesizer of cholesterol.  Diabetes panel: Insulin resistance  Omega-3 index: 9.1    Lipid panel October 2019: Total cholesterol 227  HDL 75 triglycerides 70.    IMPRESSIONS/RECOMMENDATIONS:  1. Cardiovascular risk assessment.  The patient's coronary calcium score remains 0 which places her in a low risk category.  She remains in a very low risk quartile.  2. Thoracic aortic and carotid arterial calcification.  The patient is at risk for progressive atherosclerosis.  We will repeat her carotid ultrasound.  3. Polygenic hypercholesterolemia.  T patient will switch to Crestor 5 mg Monday Wednesday Friday and continue Zetia 10 mg at Tuesday Thursday  and Saturday.  Goal is an LDL cholesterol below 130.  4. Elevated LP(a).  The patient will increase her Endur-Acin to 1000 mg at dinner.  5. Vitamin-D deficiency.  Continue vitamin-D supplementation.  6. Statin intolerance.  7. Hyperabsorber of cholesterol.  Continue Zetia.  8. Hypothyroidism.  Continue Synthroid.    Summary: We will see Kelly back in the office in six months.  She will continue a low carbohydrate low saturated fat Mediterranean diet.  We are going to repeat her carotid ultrasound.  Certainly her repeat coronary calcium score is 0 places her at a low risk quartile.    This is a 30-minute patient encounter with greater than 50% time spent in care coordination counseling.    Sincerely,  Nilson Saucedo MD  10/23/2019      Nilson Saucedo MD  10/23/2019      cc: Melonie Guy M.D., [address] [place] [address], [address], [place] [address], FAX: [phone]

## 2019-10-23 NOTE — LETTER
2019     Melonie Guy MD  [address]  [address]  [place] [address]    Patient: Kelly Meeks  YOB: 1950  Date of Visit: 10/23/2019      Dear Dr. Guy:    Thank you for referring Kelly Meeks to me for evaluation. Below are my notes for this consultation.    If you have questions, please do not hesitate to call me. I look forward to following your patient along with you.         Sincerely,        Nilson Saucedo MD        CC: No Recipients  Nilson Saucedo MD  10/23/2019  3:53 PM  Sign at close encounter  Patient MRN: [id number]  Date: 2016  Description: Prog Notes ([facility]) Cardiology  Category: Progress Notes ([facility])   Provider ID: [id number]  Practice ID: [id number]  [place] ID: [id number]    10/23/2019      Melonie Guy M.D.  [address] [facility]  [address]  [place] [address]    Re:  KELLY MEEKS  :  1950    Dear Melonie:    It was my pleasure to see your patient, Kelly Meeks, in the [facility] today.  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    Clinically, she has been doing well.  She denies any chest pain, shortness of breath or palpitations.    As you know, she has undergone previous cardiac workup.  Her coronary calcium score was 0 in May 2016.  She did have a moderate amount of calcification involving in the thoracic aorta as well as the carotid distribution.  Given her family history of a father who had a myocardial infarction in his 30's and a mother who had a stroke at age 72, we elected to begin primary prevention therapy.    Her initial lipid panel revealed a total cholesterol of 228, , HDL 62 and triglycerides 102.  She initiated on combination therapy with atorvastatin 10 mg alternating with Zetia 10 mg.  Her total cholesterol is down to 208, , apoB 99, LDL-P 1197, a reasonable response to this therapy.  Of note, her LP(a) was elevated at 420.  This  prompted a discussion and we will begin niacin with Endur-Acin 500 mg at the evening meal.    Over the past year the patient has been on the keto diet.  She is markedly increased her saturated fats.  Her repeat lipid panel reflects this increased saturated fat intake.  Total cholesterol 218  HDL 73 triglycerides 73 APO B 121 and LDL P 1568.  I did a lengthy discussion today concerning cutting back on her saturated fat intake.  She needs to follow a low carbohydrate low saturated fat Mediterranean diet and increase her exercise program.  She is agreeable to this nutrition recommendation.    At this time recommending that she undergo a repeat coronary calcium score.    PAST MEDICAL HISTORY:    Coronary calcium score 0, May 2016.    Stress echo in 2009 was a normal study.    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    PAST SURGICAL HISTORY:  None.    CURRENT MEDICATIONS:      Current Outpatient Medications:   •  albuterol 2.5 mg /3 mL (0.083 %) nebulizer solution, inhale 3 milliliter by nebulization route 3 times every day, Disp: , Rfl:   •  albuterol HFA (PROAIR HFA) 90 mcg/actuation inhaler, Inhale 2 puffs every 6 (six) hours as needed for wheezing., Disp: 1 Inhaler, Rfl: 3  •  budesonide (PULMICORT) 0.5 mg/2 mL nebulizer solution, inhale 2 milliliter by nebulization route 3 times every day, Disp: , Rfl:   •  clonazePAM (klonoPIN) 1 mg tablet, TAKE ONE TABLET BY MOUTH DAILY (Patient taking differently: Take 1 mg by mouth daily.  ), Disp: 90 tablet, Rfl: 2  •  DOCOSAHEXANOIC ACID/EPA (FISH OIL ORAL), take  atotal 3,000  daily, Disp: , Rfl:   •  estrogens, conjugated (CONJUGATED ESTROGENS VAGL), Insert into the vagina., Disp: , Rfl:   •  ezetimibe (ZETIA) 10 mg tablet, Take 1 tablet (10 mg total) by mouth 3 (three) times a week (Mon, Wed, Fri).,  Disp: 40 tablet, Rfl: 1  •  famotidine (PEPCID) 20 mg tablet, TAKE ONE TABLET BY MOUTH DAILY, Disp: 90 tablet, Rfl: 3  •  levothyroxine (SYNTHROID) 100 mcg tablet, TAKE ONE TABLET BY MOUTH EVERY DAY, Disp: 90 tablet, Rfl: 3  •  melatonin 10 mg tablet, Take by mouth nightly.  , Disp: , Rfl:   •  montelukast (SINGULAIR) 10 mg tablet, Take 1 tablet (10 mg total) by mouth nightly., Disp: 90 tablet, Rfl: 3  •  niacin (ENDUR-ACIN) 500 mg CR tablet, Take 2 tablets (1,000 mg total) by mouth nightly., Disp: 180 tablet, Rfl: 3  •  verapamil (CALAN) 40 mg tablet, TAKE ONE TABLET BY MOUTH ONCE DAILY WHEN NEEDED, Disp: 90 tablet, Rfl: 3  •  verapamil (VERELAN) 240 mg 24 hr capsule, Take 1 capsule (240 mg total) by mouth nightly., Disp: 90 capsule, Rfl: 3  •  cholecalciferol, vitamin D3, (VITAMIN D3) 2,000 unit tablet,  , Disp: , Rfl:   •  rosuvastatin (CRESTOR) 5 mg tablet, Take 1 tablet (5 mg total) by mouth 3 (three) times a week (Mon, Wed, Fri)., Disp: 90 tablet, Rfl: 3      SUPPLEMENTS:  Omega-3 fish oil, vitamin-D, coenzyme-Q10, Reservitol, curcumin, per Dr. Adrian Yang's recommendations, Endur-Acin 500 mg at dinner.    ALLERGIES:  High-dose statins.  Lipitor 20 mg daily caused significant myalgias.  We are going to attempt reintroducing 10 mg Monday, Wednesday, Friday.    FAMILY HISTORY:  Father had multiple myocardial infarctions in his 30's.  Mother with possible coronary artery disease and stroke.  Brother with hypertension.    SOCIAL HISTORY:  The patient lives with her adopted daughter.  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  Occasional glass of wine.  She quit smoking 15 years ago.    REVIEW OF SYSTEMS:  The patient's palpitations have been well controlled with the use of Verelan.  She follows with Dr. Ofelia Muse.  She has had difficulty with taking high-dose statins as they lead to symptoms of myalgias and leg pain.  She has a history of asthma and  allergies.    PHYSICAL EXAMINATION:  The patient is a middle-aged female in no acute distress.    Weight:  143.  Blood pressure: 98/78  Heart rate:  70.  Temperature: Afebrile.  HEENT:  Unremarkable.  No xanthelasma or arcus.  Neck:  Supple, no JVD.  Lungs:  Clear to auscultation and percussion.  Cardiac:  Regular rate and rhythm without murmur or gallop.  Abdomen:  Soft, bowel sounds present, no organomegaly.  Extremities:  No edema, pulses intact.  Skin:  Warm and dry.  Neuro:  Alert and oriented X 3.    LABORATORY DATA:      Coronary score:  0, 2016, thoracic aortic calcification.      Advanced lipid panel March 2019:  Total cholesterol 218  HDL 73 triglycerides 73 APO B 121.  LP(a) 473.  Inflammation panel: Normal  Endothelial function panel: Abnormal  Metabolic panel: Vitamin D 69  Sterol panel: Hyper absorber of cholesterol.  Normal synthesizer of cholesterol.  Diabetes panel: Insulin resistance  Omega-3 index: 9.1    IMPRESSIONS/RECOMMENDATIONS:  1. Cardiovascular risk assessment.  The patient's coronary calcium score is 0 which places her in a low risk category.  We will repeat her calcium score this year.  2. Thoracic aortic and carotid arterial calcification.  The patient is at risk for progressive atherosclerosis.  3. Polygenic hypercholesterolemia.  The patient will continue Lipitor 10 mg Monday, Wednesday and Friday and Zetia 10 mg on alternating days.  Our goal is an LDL cholesterol less than 100.  He will return back to a low carbohydrate low saturated fat Mediterranean diet.  She was no longer follow the keto diet.  4. Elevated LP(a).  The patient will begin Endur-Acin 500 mg at dinner.  5. Vitamin-D deficiency.  Continue vitamin-D supplementation.  6. Statin intolerance.  7. Hyperabsorber of cholesterol.  Continue Zetia.  8. Hypothyroidism.  Continue Synthroid.    Summary: We will see Kelly back in the office in six months.  She will discontinue her keto diet.  She needs to think about  intermittent fasting to be used in conjunction with a low carbohydrate low saturated fat Mediterranean diet.    Sincerely,    Nilson Saucedo MD  10/23/2019      cc: Melonie Guy M.D., [address] [place] [address], [address], [place] [address], FAX: [phone]

## 2019-10-23 NOTE — PROGRESS NOTES
Patient MRN: [id number]  Date: 2016  Description: Prog Notes ([facility]) Cardiology  Category: Progress Notes ([facility])   Provider ID: [id number]  Practice ID: [id number]  [place] ID: [id number]    10/23/2019      Melonie Guy M.D.  [address] [facility] [address] [facility]  [address]  [place] [address]    Re:  ARABELLA MEEKS  :  1950    Dear Melonie:    It was my pleasure to see your patient, Arabella Meeks, in the [facility] today.  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    Clinically, she has been doing well.  She denies any chest pain, shortness of breath or palpitations.    As you know, she has undergone previous cardiac workup.  Her coronary calcium score was 0 in May 2016.  She did have a moderate amount of calcification involving in the thoracic aorta as well as the carotid distribution.  Given her family history of a father who had a myocardial infarction in his 30's and a mother who had a stroke at age 72, we elected to begin primary prevention therapy.    Her initial lipid panel revealed a total cholesterol of 228, , HDL 62 and triglycerides 102.  She initiated on combination therapy with atorvastatin 10 mg alternating with Zetia 10 mg.  Her total cholesterol is down to 208, , apoB 99, LDL-P 1197, a reasonable response to this therapy.  Of note, her LP(a) was elevated at 420.  This prompted a discussion and we will begin niacin with Endur-Acin 500 mg at the evening meal.    Over the past year the patient has been on the keto diet.  She is markedly increased her saturated fats.  Her repeat lipid panel reflects this increased saturated fat intake.  Total cholesterol 218  HDL 73 triglycerides 73 APO B 121 and LDL P 1568.  I did a lengthy discussion today concerning cutting back on her saturated fat intake.  She needs to follow a low carbohydrate low saturated fat Mediterranean diet and increase her exercise program.  She is agreeable to  this nutrition recommendation.    At this time recommending that she undergo a repeat coronary calcium score.  Her repeat calcium score in April 2019 was 0.  She remains in a very low low risk quartile.  We discussed her prevention program in detail.  Her repeat lipid panel revealed a total cholesterol 227  HDL 75 and triglycerides 70.  We both agreed to switch her from Lipitor to Crestor at 5 mg Monday Wednesday Friday.  We also agreed to increase her Endur-Acin 2000 mg the evening meal.  Our goal is to lower her LDL cholesterol to below 130.    PAST MEDICAL HISTORY:    Coronary calcium score 0, May 2016.    Stress echo in 2009 was a normal study.    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    PAST SURGICAL HISTORY:  None.    CURRENT MEDICATIONS:      Current Outpatient Medications:   •  albuterol 2.5 mg /3 mL (0.083 %) nebulizer solution, inhale 3 milliliter by nebulization route 3 times every day, Disp: , Rfl:   •  albuterol HFA (PROAIR HFA) 90 mcg/actuation inhaler, Inhale 2 puffs every 6 (six) hours as needed for wheezing., Disp: 1 Inhaler, Rfl: 3  •  budesonide (PULMICORT) 0.5 mg/2 mL nebulizer solution, inhale 2 milliliter by nebulization route 3 times every day, Disp: , Rfl:   •  clonazePAM (klonoPIN) 1 mg tablet, TAKE ONE TABLET BY MOUTH DAILY (Patient taking differently: Take 1 mg by mouth daily.  ), Disp: 90 tablet, Rfl: 2  •  DOCOSAHEXANOIC ACID/EPA (FISH OIL ORAL), take  atotal 3,000  daily, Disp: , Rfl:   •  estrogens, conjugated (CONJUGATED ESTROGENS VAGL), Insert into the vagina., Disp: , Rfl:   •  ezetimibe (ZETIA) 10 mg tablet, Take 1 tablet (10 mg total) by mouth 3 (three) times a week (Mon, Wed, Fri)., Disp: 40 tablet, Rfl: 1  •  famotidine (PEPCID) 20 mg tablet, TAKE ONE TABLET BY MOUTH DAILY, Disp: 90 tablet, Rfl: 3  •   levothyroxine (SYNTHROID) 100 mcg tablet, TAKE ONE TABLET BY MOUTH EVERY DAY, Disp: 90 tablet, Rfl: 3  •  melatonin 10 mg tablet, Take by mouth nightly.  , Disp: , Rfl:   •  montelukast (SINGULAIR) 10 mg tablet, Take 1 tablet (10 mg total) by mouth nightly., Disp: 90 tablet, Rfl: 3  •  niacin (ENDUR-ACIN) 500 mg CR tablet, Take 2 tablets (1,000 mg total) by mouth nightly., Disp: 180 tablet, Rfl: 3  •  verapamil (CALAN) 40 mg tablet, TAKE ONE TABLET BY MOUTH ONCE DAILY WHEN NEEDED, Disp: 90 tablet, Rfl: 3  •  verapamil (VERELAN) 240 mg 24 hr capsule, Take 1 capsule (240 mg total) by mouth nightly., Disp: 90 capsule, Rfl: 3  •  cholecalciferol, vitamin D3, (VITAMIN D3) 2,000 unit tablet,  , Disp: , Rfl:   •  rosuvastatin (CRESTOR) 5 mg tablet, Take 1 tablet (5 mg total) by mouth 3 (three) times a week (Mon, Wed, Fri)., Disp: 90 tablet, Rfl: 3      SUPPLEMENTS:  Omega-3 fish oil, vitamin-D, coenzyme-Q10, Reservitol, curcumin, per Dr. Ardian Yang's recommendations, Endur-Acin 500 mg at dinner.    ALLERGIES:  High-dose statins.  Lipitor 20 mg daily caused significant myalgias.  We are going to attempt reintroducing 10 mg Monday, Wednesday, Friday.    FAMILY HISTORY:  Father had multiple myocardial infarctions in his 30's.  Mother with possible coronary artery disease and stroke.  Brother with hypertension.    SOCIAL HISTORY:  The patient lives with her adopted daughter.  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  Occasional glass of wine.  She quit smoking 15 years ago.    REVIEW OF SYSTEMS:  The patient's palpitations have been well controlled with the use of Verelan.  She follows with Dr. Ofelia Muse.  She has had difficulty with taking high-dose statins as they lead to symptoms of myalgias and leg pain.  She has a history of asthma and allergies.    PHYSICAL EXAMINATION:  The patient is a middle-aged female in no acute distress.    Weight:  143.  Blood pressure:  98/78  Heart rate:  70.  Temperature: Afebrile.  HEENT:  Unremarkable.  No xanthelasma or arcus.  Neck:  Supple, no JVD.  Lungs:  Clear to auscultation and percussion.  Cardiac:  Regular rate and rhythm without murmur or gallop.  Abdomen:  Soft, bowel sounds present, no organomegaly.  Extremities:  No edema, pulses intact.  Skin:  Warm and dry.  Neuro:  Alert and oriented X 3.    LABORATORY DATA:      Coronary score:  0, 2016, thoracic aortic calcification.      Advanced lipid panel March 2019:  Total cholesterol 218  HDL 73 triglycerides 73 APO B 121.  LP(a) 473.  Inflammation panel: Normal  Endothelial function panel: Abnormal  Metabolic panel: Vitamin D 69  Sterol panel: Hyper absorber of cholesterol.  Normal synthesizer of cholesterol.  Diabetes panel: Insulin resistance  Omega-3 index: 9.1    Lipid panel October 2019: Total cholesterol 227  HDL 75 triglycerides 70.    IMPRESSIONS/RECOMMENDATIONS:  1. Cardiovascular risk assessment.  The patient's coronary calcium score remains 0 which places her in a low risk category.  She remains in a very low risk quartile.  2. Thoracic aortic and carotid arterial calcification.  The patient is at risk for progressive atherosclerosis.  We will repeat her carotid ultrasound.  3. Polygenic hypercholesterolemia.  T patient will switch to Crestor 5 mg Monday Wednesday Friday and continue Zetia 10 mg at Tuesday Thursday and Saturday.  Goal is an LDL cholesterol below 130.  4. Elevated LP(a).  The patient will increase her Endur-Acin to 1000 mg at dinner.  5. Vitamin-D deficiency.  Continue vitamin-D supplementation.  6. Statin intolerance.  7. Hyperabsorber of cholesterol.  Continue Zetia.  8. Hypothyroidism.  Continue Synthroid.    Summary: We will see Kelly back in the office in six months.  She will continue a low carbohydrate low saturated fat Mediterranean diet.  We are going to repeat her carotid ultrasound.  Certainly her repeat coronary calcium score is 0  places her at a low risk quartile.    This is a 30-minute patient encounter with greater than 50% time spent in care coordination counseling.    Sincerely,  Nilson Saucedo MD  10/23/2019      Nilson Saucedo MD  10/23/2019      cc: Melonie Guy M.D., [address] [place] [address], [address], [place] [address], FAX: [phone]

## 2019-10-28 ENCOUNTER — TELEPHONE (OUTPATIENT)
Dept: FAMILY MEDICINE | Facility: CLINIC | Age: 69
End: 2019-10-28

## 2019-10-29 LAB
BASOPHILS # BLD AUTO: 0 X10E3/UL (ref 0–0.2)
BASOPHILS NFR BLD AUTO: 1 %
EOSINOPHIL # BLD AUTO: 0.1 X10E3/UL (ref 0–0.4)
EOSINOPHIL NFR BLD AUTO: 3 %
ERYTHROCYTE [DISTWIDTH] IN BLOOD BY AUTOMATED COUNT: 14 % (ref 12.3–15.4)
HCT VFR BLD AUTO: 41.8 % (ref 34–46.6)
HCV AB S/CO SERPL IA: <0.1 S/CO RATIO (ref 0–0.9)
HGB BLD-MCNC: 14 G/DL (ref 11.1–15.9)
IMM GRANULOCYTES # BLD AUTO: 0 X10E3/UL (ref 0–0.1)
IMM GRANULOCYTES NFR BLD AUTO: 0 %
LYMPHOCYTES # BLD AUTO: 1.7 X10E3/UL (ref 0.7–3.1)
LYMPHOCYTES NFR BLD AUTO: 36 %
MCH RBC QN AUTO: 31.7 PG (ref 26.6–33)
MCHC RBC AUTO-ENTMCNC: 33.5 G/DL (ref 31.5–35.7)
MCV RBC AUTO: 95 FL (ref 79–97)
MONOCYTES # BLD AUTO: 0.5 X10E3/UL (ref 0.1–0.9)
MONOCYTES NFR BLD AUTO: 11 %
NEUTROPHILS # BLD AUTO: 2.4 X10E3/UL (ref 1.4–7)
NEUTROPHILS NFR BLD AUTO: 49 %
PLATELET # BLD AUTO: 311 X10E3/UL (ref 150–450)
RBC # BLD AUTO: 4.41 X10E6/UL (ref 3.77–5.28)
TSH SERPL DL<=0.005 MIU/L-ACNC: 3.28 UIU/ML (ref 0.45–4.5)
WBC # BLD AUTO: 4.7 X10E3/UL (ref 3.4–10.8)

## 2019-10-30 LAB
APPEARANCE UR: CLEAR
BACTERIA #/AREA URNS HPF: NORMAL /[HPF]
BACTERIA UR CULT: NO GROWTH
BACTERIA UR CULT: NORMAL
BILIRUB UR QL STRIP: NEGATIVE
COLOR UR: YELLOW
EPI CELLS #/AREA URNS HPF: NORMAL /HPF (ref 0–10)
GLUCOSE UR QL: NEGATIVE
HGB UR QL STRIP: NEGATIVE
KETONES UR QL STRIP: NEGATIVE
LAB CORP URINALYSIS REFLEX: ABNORMAL
LEUKOCYTE ESTERASE UR QL STRIP: ABNORMAL
MICRO URNS: ABNORMAL
MUCOUS THREADS URNS QL MICRO: PRESENT
NITRITE UR QL STRIP: NEGATIVE
PH UR STRIP: 6.5 [PH] (ref 5–7.5)
PROT UR QL STRIP: NEGATIVE
RBC #/AREA URNS HPF: NORMAL /HPF (ref 0–2)
SP GR UR: 1.01 (ref 1–1.03)
UROBILINOGEN UR STRIP-MCNC: 0.2 MG/DL (ref 0.2–1)
WBC #/AREA URNS HPF: NORMAL /HPF (ref 0–5)

## 2019-11-04 ENCOUNTER — TELEPHONE (OUTPATIENT)
Dept: SCHEDULING | Facility: CLINIC | Age: 69
End: 2019-11-04

## 2019-11-04 ENCOUNTER — OFFICE VISIT (OUTPATIENT)
Dept: FAMILY MEDICINE | Facility: CLINIC | Age: 69
End: 2019-11-04
Payer: MEDICARE

## 2019-11-04 VITALS
TEMPERATURE: 98.1 F | OXYGEN SATURATION: 98 % | SYSTOLIC BLOOD PRESSURE: 124 MMHG | DIASTOLIC BLOOD PRESSURE: 60 MMHG | BODY MASS INDEX: 26.16 KG/M2 | HEART RATE: 68 BPM | RESPIRATION RATE: 18 BRPM | WEIGHT: 143 LBS

## 2019-11-04 DIAGNOSIS — E88.819 INSULIN RESISTANCE: ICD-10-CM

## 2019-11-04 DIAGNOSIS — E03.9 ACQUIRED HYPOTHYROIDISM: ICD-10-CM

## 2019-11-04 DIAGNOSIS — J45.909 ASTHMA, UNSPECIFIED ASTHMA SEVERITY, UNSPECIFIED WHETHER COMPLICATED, UNSPECIFIED WHETHER PERSISTENT: Primary | ICD-10-CM

## 2019-11-04 DIAGNOSIS — I73.00 RAYNAUD'S DISEASE WITHOUT GANGRENE: ICD-10-CM

## 2019-11-04 PROBLEM — F41.9 ANXIETY: Status: RESOLVED | Noted: 2019-02-06 | Resolved: 2019-11-04

## 2019-11-04 PROCEDURE — 99214 OFFICE O/P EST MOD 30 MIN: CPT | Performed by: INTERNAL MEDICINE

## 2019-11-04 RX ORDER — EZETIMIBE 10 MG/1
10 TABLET ORAL 3 TIMES WEEKLY
Qty: 45 TABLET | Refills: 1 | Status: SHIPPED | OUTPATIENT
Start: 2019-11-04 | End: 2019-12-16 | Stop reason: SDUPTHER

## 2019-11-04 RX ORDER — MONTELUKAST SODIUM 10 MG/1
10 TABLET ORAL NIGHTLY
Qty: 90 TABLET | Refills: 3 | Status: SHIPPED | OUTPATIENT
Start: 2019-11-04 | End: 2020-11-23

## 2019-11-04 RX ORDER — ALBUTEROL SULFATE 90 UG/1
2 INHALANT RESPIRATORY (INHALATION) 4 TIMES DAILY PRN
Qty: 3 INHALER | Refills: 3 | Status: SHIPPED | OUTPATIENT
Start: 2019-11-04 | End: 2021-10-12

## 2019-11-04 RX ORDER — ALBUTEROL SULFATE 90 UG/1
2 INHALANT RESPIRATORY (INHALATION) 4 TIMES DAILY PRN
Qty: 1 INHALER | Refills: 3 | Status: SHIPPED | OUTPATIENT
Start: 2019-11-04 | End: 2019-11-04

## 2019-11-04 RX ORDER — FAMOTIDINE 20 MG/1
20 TABLET, FILM COATED ORAL
Qty: 90 TABLET | Refills: 3 | Status: SHIPPED | OUTPATIENT
Start: 2019-11-04 | End: 2020-02-20

## 2019-11-04 ASSESSMENT — ENCOUNTER SYMPTOMS
VOMITING: 0
AGITATION: 0
NEUROLOGICAL NEGATIVE: 1
SORE THROAT: 0
CONSTIPATION: 0
DYSPHORIC MOOD: 0
HEMATOLOGIC/LYMPHATIC NEGATIVE: 1
DIARRHEA: 0
ABDOMINAL PAIN: 0
NAUSEA: 0
DYSURIA: 0
SLEEP DISTURBANCE: 0
CONSTITUTIONAL NEGATIVE: 1
WOUND: 0
WHEEZING: 1
FLANK PAIN: 0
CARDIOVASCULAR NEGATIVE: 1
TROUBLE SWALLOWING: 0
FREQUENCY: 0
MUSCULOSKELETAL NEGATIVE: 1
EYE REDNESS: 0
DECREASED CONCENTRATION: 0

## 2019-11-04 NOTE — ASSESSMENT & PLAN NOTE
Discussed the importance of diet and exercise to prevent this issue from worsening, watching carbs   Will recheck at upcoming physical

## 2019-11-04 NOTE — TELEPHONE ENCOUNTER
Pt calling to have her lab slips from Dr Saucedo faxed to Dr Guy, pt is currently there now.  Pt states it is her labs from 4/2019 but those were ordered by Dr Guy. E Faxed last slips ordered by Dr Saucedo/Leana.    Dr Guy    [phone].

## 2019-11-04 NOTE — PROGRESS NOTES
Subjective     Patient ID: Kelly Meeks is a 69 y.o. female.    She is here with several concerns- wants to know how to manage her asthma meds - finds that on occasion if she needs to use Albuterol would like to use it more often during the day   Had recent labs with Dr Saucedo which showed insulin resistance   Also notes symptoms c/w Raynauds       Review of Systems   Constitutional: Negative.    HENT: Negative for ear pain, nosebleeds, sore throat, tinnitus and trouble swallowing.    Eyes: Negative for redness and visual disturbance.   Respiratory: Positive for wheezing.    Cardiovascular: Negative.    Gastrointestinal: Negative for abdominal pain, constipation, diarrhea, nausea and vomiting.   Endocrine: Positive for cold intolerance. Negative for heat intolerance.   Genitourinary: Negative for dysuria, flank pain, frequency and urgency.   Musculoskeletal: Negative.    Skin: Negative for rash and wound.   Allergic/Immunologic: Negative for environmental allergies and food allergies.   Neurological: Negative.    Hematological: Negative.    Psychiatric/Behavioral: Negative for agitation, decreased concentration, dysphoric mood and sleep disturbance.       Objective     Vitals:    11/04/19 1141   BP: 124/60   BP Location: Left upper arm   Patient Position: Sitting   Pulse: 68   Resp: 18   Temp: 36.7 °C (98.1 °F)   SpO2: 98%   Weight: 64.9 kg (143 lb)     Body mass index is 26.16 kg/m².    Physical Exam   Constitutional: She is oriented to person, place, and time. She appears well-developed. No distress.   HENT:   Head: Normocephalic.   Mouth/Throat: Oropharynx is clear and moist.   Eyes: Conjunctivae are normal. No scleral icterus.   Neck: Normal range of motion. No thyromegaly present.   Cardiovascular: Normal rate, regular rhythm and normal heart sounds.   No murmur heard.  Pulmonary/Chest: Effort normal and breath sounds normal. She has no wheezes.   Musculoskeletal: She exhibits no edema.    Lymphadenopathy:     She has no cervical adenopathy.   Neurological: She is alert and oriented to person, place, and time.   Skin: Skin is warm and dry. No rash noted.   Psychiatric: She has a normal mood and affect. Her behavior is normal.   Nursing note and vitals reviewed.      Assessment/Plan   Diagnoses and all orders for this visit:    Asthma, unspecified asthma severity, unspecified whether complicated, unspecified whether persistent (Primary)  Comments:  advised that she can increase frequency short term for her albuterol, renewed Singulair as well and will get peak flow meter     Acquired hypothyroidism    Raynaud's disease without gangrene  Assessment & Plan:  Reassured, has this on a limited basis       Insulin resistance  Assessment & Plan:  Discussed the importance of diet and exercise to prevent this issue from worsening, watching carbs   Will recheck at upcoming physical       Other orders  -     famotidine (PEPCID) 20 mg tablet; Take 1 tablet (20 mg total) by mouth once daily.  -     montelukast (SINGULAIR) 10 mg tablet; Take 1 tablet (10 mg total) by mouth nightly.  -     albuterol HFA (PROAIR HFA) 90 mcg/actuation inhaler; Inhale 2 puffs 4 (four) times a day as needed.

## 2019-11-04 NOTE — TELEPHONE ENCOUNTER
Kelly called to request her [facility] lab results form 3/2019 and her lab results from October be mailed to her.

## 2019-12-04 ENCOUNTER — OFFICE VISIT (OUTPATIENT)
Dept: FAMILY MEDICINE | Facility: CLINIC | Age: 69
End: 2019-12-04
Payer: MEDICARE

## 2019-12-04 VITALS
DIASTOLIC BLOOD PRESSURE: 62 MMHG | OXYGEN SATURATION: 98 % | TEMPERATURE: 97.9 F | BODY MASS INDEX: 26.13 KG/M2 | RESPIRATION RATE: 18 BRPM | WEIGHT: 142 LBS | HEART RATE: 62 BPM | HEIGHT: 62 IN | SYSTOLIC BLOOD PRESSURE: 100 MMHG

## 2019-12-04 DIAGNOSIS — E03.9 ACQUIRED HYPOTHYROIDISM: ICD-10-CM

## 2019-12-04 DIAGNOSIS — E88.819 INSULIN RESISTANCE: ICD-10-CM

## 2019-12-04 DIAGNOSIS — Z00.00 MEDICARE ANNUAL WELLNESS VISIT, SUBSEQUENT: Primary | ICD-10-CM

## 2019-12-04 DIAGNOSIS — M79.672 FOOT PAIN, LEFT: ICD-10-CM

## 2019-12-04 DIAGNOSIS — I47.10 SUPRAVENTRICULAR TACHYCARDIA (CMS/HCC): ICD-10-CM

## 2019-12-04 DIAGNOSIS — R26.89 IMBALANCE: ICD-10-CM

## 2019-12-04 PROCEDURE — G0439 PPPS, SUBSEQ VISIT: HCPCS | Performed by: INTERNAL MEDICINE

## 2019-12-04 PROCEDURE — 99214 OFFICE O/P EST MOD 30 MIN: CPT | Mod: 25 | Performed by: INTERNAL MEDICINE

## 2019-12-04 ASSESSMENT — ENCOUNTER SYMPTOMS
DYSURIA: 0
DECREASED CONCENTRATION: 0
AGITATION: 0
CARDIOVASCULAR NEGATIVE: 1
TROUBLE SWALLOWING: 0
DIARRHEA: 0
WOUND: 0
CONSTITUTIONAL NEGATIVE: 1
VOMITING: 0
EYE REDNESS: 0
FLANK PAIN: 0
MUSCULOSKELETAL NEGATIVE: 1
NEUROLOGICAL NEGATIVE: 1
ABDOMINAL PAIN: 0
SLEEP DISTURBANCE: 0
CONSTIPATION: 0
COUGH: 1
HEMATOLOGIC/LYMPHATIC NEGATIVE: 1
FREQUENCY: 0
NAUSEA: 0
SORE THROAT: 0
DYSPHORIC MOOD: 0

## 2019-12-04 ASSESSMENT — MINI COG
TOTAL SCORE: 5
COMPLETED: YES

## 2019-12-04 NOTE — PROGRESS NOTES
"  Subjective     Patient ID: Kelly Meeks is a 69 y.o. female.    She is here for MAW and review of chronic conditions   Having left foot pain for past several months  Sees a cardiologist for lipid management       Review of Systems   Constitutional: Negative.    HENT: Negative for ear pain, nosebleeds, sore throat, tinnitus and trouble swallowing.    Eyes: Negative for redness and visual disturbance.   Respiratory: Positive for cough.    Cardiovascular: Negative.    Gastrointestinal: Negative for abdominal pain, constipation, diarrhea, nausea and vomiting.   Endocrine: Negative for cold intolerance and heat intolerance.   Genitourinary: Negative for dysuria, flank pain, frequency and urgency.   Musculoskeletal: Negative.    Skin: Negative for rash and wound.   Allergic/Immunologic: Negative for environmental allergies and food allergies.   Neurological: Negative.    Hematological: Negative.    Psychiatric/Behavioral: Negative for agitation, decreased concentration, dysphoric mood and sleep disturbance.       Objective     Vitals:    12/04/19 1106   BP: 100/62   BP Location: Left upper arm   Patient Position: Sitting   Pulse: 62   Resp: 18   Temp: 36.6 °C (97.9 °F)   SpO2: 98%   Weight: 64.4 kg (142 lb)   Height: 1.562 m (5' 1.5\")     Body mass index is 26.4 kg/m².    Physical Exam   Constitutional: She is oriented to person, place, and time. She appears well-developed. No distress.   She is here today for a complete physical   HENT:   Head: Normocephalic.   Nose: Nose normal.   Mouth/Throat: Oropharynx is clear and moist.   Eyes: Pupils are equal, round, and reactive to light. Conjunctivae and EOM are normal.   Neck: Normal range of motion. No JVD present. No thyromegaly present.   Cardiovascular: Normal rate, regular rhythm, normal heart sounds and intact distal pulses.   No murmur heard.  Pulmonary/Chest: Effort normal and breath sounds normal. No stridor. No respiratory distress. She has no wheezes. Right " breast exhibits no inverted nipple, no mass, no nipple discharge, no skin change and no tenderness. Left breast exhibits no inverted nipple, no mass, no nipple discharge, no skin change and no tenderness.   Abdominal: Soft. Bowel sounds are normal. She exhibits no distension and no mass. There is no tenderness. No hernia.   Musculoskeletal: Normal range of motion. She exhibits no edema or deformity.   Lymphadenopathy:     She has no cervical adenopathy.   Neurological: She is alert and oriented to person, place, and time. No cranial nerve deficit. Coordination normal.   Skin: Skin is warm and dry. Capillary refill takes less than 2 seconds. No rash noted.   Psychiatric: She has a normal mood and affect. Her behavior is normal. Judgment and thought content normal.       Assessment/Plan   Diagnoses and all orders for this visit:    Medicare annual wellness visit, subsequent (Primary)    Foot pain, left  -     Ambulatory referral to Podiatry; Future    Imbalance  -     Ambulatory referral to Physical Therapy; Future    Supraventricular tachycardia (CMS/HCC)

## 2019-12-16 RX ORDER — EZETIMIBE 10 MG/1
10 TABLET ORAL 3 TIMES WEEKLY
Qty: 45 TABLET | Refills: 3 | Status: SHIPPED | OUTPATIENT
Start: 2019-12-16 | End: 2021-02-15

## 2019-12-20 ENCOUNTER — HOSPITAL ENCOUNTER (OUTPATIENT)
Dept: RADIOLOGY | Facility: HOSPITAL | Age: 69
Discharge: HOME | End: 2019-12-20
Attending: INTERNAL MEDICINE
Payer: MEDICARE

## 2019-12-20 DIAGNOSIS — R07.81 PLEURODYNIA: Primary | ICD-10-CM

## 2019-12-20 DIAGNOSIS — R07.81 PLEURODYNIA: ICD-10-CM

## 2019-12-20 PROCEDURE — 71046 X-RAY EXAM CHEST 2 VIEWS: CPT

## 2019-12-30 RX ORDER — CLONAZEPAM 1 MG/1
1 TABLET ORAL DAILY
Qty: 90 TABLET | Refills: 0 | Status: SHIPPED | OUTPATIENT
Start: 2019-12-30 | End: 2020-02-20

## 2019-12-30 NOTE — TELEPHONE ENCOUNTER
Medicine Refill Request    Last Office Visit: 12/4/2019  Next Office Visit: Visit date not found        Current Outpatient Medications:   •  albuterol 2.5 mg /3 mL (0.083 %) nebulizer solution, inhale 3 milliliter by nebulization route 3 times every day, Disp: , Rfl:   •  albuterol HFA (PROAIR HFA) 90 mcg/actuation inhaler, Inhale 2 puffs 4 (four) times a day as needed., Disp: 3 Inhaler, Rfl: 3  •  budesonide (PULMICORT) 0.5 mg/2 mL nebulizer solution, inhale 2 milliliter by nebulization route 3 times every day, Disp: , Rfl:   •  cholecalciferol, vitamin D3, (VITAMIN D3) 2,000 unit tablet, 2 (two) times a day.  , Disp: , Rfl:   •  clonazePAM (klonoPIN) 1 mg tablet, TAKE ONE TABLET BY MOUTH DAILY (Patient taking differently: Take 1 mg by mouth daily.  ), Disp: 90 tablet, Rfl: 2  •  DOCOSAHEXANOIC ACID/EPA (FISH OIL ORAL), take  atotal 3,000  daily, Disp: , Rfl:   •  estrogens, conjugated (CONJUGATED ESTROGENS VAGL), Insert into the vagina., Disp: , Rfl:   •  ezetimibe (ZETIA) 10 mg tablet, Take 1 tablet (10 mg total) by mouth 3 (three) times a week (Mon, Wed, Fri)., Disp: 45 tablet, Rfl: 3  •  famotidine (PEPCID) 20 mg tablet, Take 1 tablet (20 mg total) by mouth once daily., Disp: 90 tablet, Rfl: 3  •  levothyroxine (SYNTHROID) 100 mcg tablet, TAKE ONE TABLET BY MOUTH EVERY DAY, Disp: 90 tablet, Rfl: 3  •  melatonin 10 mg tablet, Take by mouth nightly.  , Disp: , Rfl:   •  montelukast (SINGULAIR) 10 mg tablet, Take 1 tablet (10 mg total) by mouth nightly., Disp: 90 tablet, Rfl: 3  •  niacin (ENDUR-ACIN) 500 mg CR tablet, Take 2 tablets (1,000 mg total) by mouth nightly., Disp: 180 tablet, Rfl: 3  •  rosuvastatin (CRESTOR) 5 mg tablet, Take 1 tablet (5 mg total) by mouth 3 (three) times a week (Mon, Wed, Fri)., Disp: 90 tablet, Rfl: 3  •  verapamil (CALAN) 40 mg tablet, TAKE ONE TABLET BY MOUTH ONCE DAILY WHEN NEEDED, Disp: 90 tablet, Rfl: 3  •  verapamil (VERELAN) 240 mg 24 hr capsule, Take 1 capsule (240 mg total)  by mouth nightly., Disp: 90 capsule, Rfl: 3      BP Readings from Last 3 Encounters:   12/04/19 100/62   11/04/19 124/60   10/23/19 132/68       Recent Lab results:  Lab Results   Component Value Date    CHOL 227 (H) 10/17/2019   ,   Lab Results   Component Value Date    HDL 75 10/17/2019   ,   Lab Results   Component Value Date    LDLCALC 138 (H) 10/17/2019   ,   Lab Results   Component Value Date    TRIG 70 10/17/2019        Lab Results   Component Value Date    GLUCOSE 85 10/17/2019   , No results found for: HGBA1C      Lab Results   Component Value Date    CREATININE 0.8 10/17/2019       Lab Results   Component Value Date    TSH 3.280 10/28/2019

## 2019-12-31 ENCOUNTER — HOSPITAL ENCOUNTER (OUTPATIENT)
Dept: CARDIOLOGY | Facility: HOSPITAL | Age: 69
Discharge: HOME | End: 2019-12-31
Attending: INTERNAL MEDICINE
Payer: MEDICARE

## 2019-12-31 DIAGNOSIS — I65.23 ARTERIOSCLEROSIS OF BOTH CAROTID ARTERIES: ICD-10-CM

## 2019-12-31 LAB
LEFT CCA DIST DIAS: 23.2 CM/S
LEFT CCA DIST SYS: 76.4 CM/S
LEFT CCA MID DIAS: 24.08 CM/S
LEFT CCA MID SYS: 78.16 CM/S
LEFT CCA PROX DIAS: 23.47 CM/S
LEFT CCA PROX SYS: 73.19 CM/S
LEFT ECA DIAS: 10.06 CM/S
LEFT ECA SYS: 46.11 CM/S
LEFT ICA DIST DIAS: 17.74 CM/S
LEFT ICA DIST SYS: 53.04 CM/S
LEFT ICA MID DIAS: 21.46 CM/S
LEFT ICA MID SYS: 55.48 CM/S
LEFT ICA PROX DIAS: 17.97 CM/S
LEFT ICA PROX SYS: 59.84 CM/S
LEFT ICA/CCA SYS: 0.77
LEFT ICA/CCA SYS: 0.78
LEFT VERTEBRAL DIAS: 18.89 CM/S
LEFT VERTEBRAL SYS: 57.72 CM/S
LT ECA PROX EDV: 10.06 CM/S
LT ECA PROX PSV: 46.11 CM/S
LT VERTEBRAL PROX EDV: 18.89 CM/S
LT VERTEBRAL PROX PSV: 57.72 CM/S
RIGHT CCA DIST DIAS: 20.74 CM/S
RIGHT CCA DIST SYS: 75.52 CM/S
RIGHT CCA MID DIAS: 31.17 CM/S
RIGHT CCA MID SYS: 83.35 CM/S
RIGHT CCA PROX DIAS: 25.96 CM/S
RIGHT CCA PROX SYS: 88.56 CM/S
RIGHT ECA DIAS: 15.52 CM/S
RIGHT ECA SYS: 70.3 CM/S
RIGHT ICA DIST DIAS: 29.87 CM/S
RIGHT ICA DIST SYS: 74.22 CM/S
RIGHT ICA MID DIAS: 24.65 CM/S
RIGHT ICA MID SYS: 79.44 CM/S
RIGHT ICA PROX DIAS: 29.87 CM/S
RIGHT ICA PROX SYS: 95.09 CM/S
RIGHT ICA/CCA SYS: 1.26
RIGHT ICA/CCA SYS: 1.44
RIGHT VERTEBRAL DIAS: 14.74 CM/S
RIGHT VERTEBRAL SYS: 44.4 CM/S
RT ECA PROC EDV: 15.52 CM/S
RT VERTEBRAL PROX EDV: 14.74 CM/S

## 2019-12-31 PROCEDURE — 93880 EXTRACRANIAL BILAT STUDY: CPT

## 2020-01-03 ENCOUNTER — TELEPHONE (OUTPATIENT)
Dept: CARDIOLOGY | Facility: CLINIC | Age: 70
End: 2020-01-03

## 2020-01-03 ENCOUNTER — TELEPHONE (OUTPATIENT)
Dept: SCHEDULING | Facility: CLINIC | Age: 70
End: 2020-01-03

## 2020-01-03 NOTE — TELEPHONE ENCOUNTER
Dr. Saucedo,    Pt. Called to move her apt up from April.I moved her apt up to January 24th. She wanted you to call her regarding her carotid ultrasound and xray results. She can be reached at [phone]. She will be in a meeting today from 1:30pm - 3:00pm but can be otherwise reached.    Thanks.    mick

## 2020-01-03 NOTE — TELEPHONE ENCOUNTER
Pt request call for sooner appt offered appt in March pt declined for sooner    Please call [phone].         Per pt asking not to be called today between 1:30 and 3 pm she will be in a meeting

## 2020-01-24 ENCOUNTER — OFFICE VISIT (OUTPATIENT)
Dept: CARDIOLOGY | Facility: CLINIC | Age: 70
End: 2020-01-24
Payer: MEDICARE

## 2020-01-24 VITALS
HEART RATE: 75 BPM | HEIGHT: 62 IN | OXYGEN SATURATION: 96 % | BODY MASS INDEX: 26.4 KG/M2 | DIASTOLIC BLOOD PRESSURE: 70 MMHG | SYSTOLIC BLOOD PRESSURE: 116 MMHG

## 2020-01-24 DIAGNOSIS — E78.00 PURE HYPERCHOLESTEROLEMIA: ICD-10-CM

## 2020-01-24 DIAGNOSIS — I47.10 SUPRAVENTRICULAR TACHYCARDIA (CMS/HCC): ICD-10-CM

## 2020-01-24 DIAGNOSIS — R01.1 MURMUR: ICD-10-CM

## 2020-01-24 DIAGNOSIS — E78.5 DYSLIPIDEMIA: ICD-10-CM

## 2020-01-24 DIAGNOSIS — R07.89 CHEST DISCOMFORT: Primary | ICD-10-CM

## 2020-01-24 PROCEDURE — 99214 OFFICE O/P EST MOD 30 MIN: CPT | Performed by: INTERNAL MEDICINE

## 2020-01-24 PROCEDURE — 93000 ELECTROCARDIOGRAM COMPLETE: CPT | Performed by: INTERNAL MEDICINE

## 2020-01-24 RX ORDER — PSYLLIUM HUSK 0.4 G
CAPSULE ORAL 2 TIMES DAILY
COMMUNITY
End: 2023-06-28

## 2020-01-24 NOTE — PROGRESS NOTES
[facility]    2020    Melonie Guy M.D.  [address] [facility]  [address]  [place] [address]    Re:  ARABELLA MEEKS  :  1950    Dear Melonie:    It was my pleasure to see your patient, Arabella Meeks, in the [facility] today.  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    Clinically, she has been doing well.  She denies any chest pain, shortness of breath or palpitations.    As you know, she has undergone previous cardiac workup.  Her coronary calcium score was 0 in May 2016.  She did have a moderate amount of calcification involving in the thoracic aorta as well as the carotid distribution.  Given her family history of a father who had a myocardial infarction in his 30's and a mother who had a stroke at age 72, we elected to begin primary prevention therapy.    Her initial lipid panel revealed a total cholesterol of 228, , HDL 62 and triglycerides 102.  She initiated on combination therapy with atorvastatin 10 mg alternating with Zetia 10 mg.  Her total cholesterol is down to 208, , apoB 99, LDL-P 1197, a reasonable response to this therapy.  Of note, her LP(a) was elevated at 420.  This prompted a discussion and we will begin niacin with Endur-Acin 500 mg at the evening meal.    Over the past year the patient has been on the keto diet.  She is markedly increased her saturated fats.  Her repeat lipid panel reflects this increased saturated fat intake.  Total cholesterol 218  HDL 73 triglycerides 73 APO B 121 and LDL P 1568.  I did a lengthy discussion today concerning cutting back on her saturated fat intake.  She needs to follow a low carbohydrate low saturated fat Mediterranean diet and increase her exercise program.  She is agreeable to this nutrition recommendation.    At this time recommending that she undergo a repeat coronary calcium score.  Her repeat calcium score in 2019 was 0.  She remains in a very low  low risk quartile.  We discussed her prevention program in detail.  Her repeat lipid panel revealed a total cholesterol 227  HDL 75 and triglycerides 70.  We both agreed to switch her from Lipitor to Crestor at 5 mg Monday Wednesday Friday.  We also agreed to increase her Endur-Acin 2000 mg the evening meal.  Our goal is to lower her LDL cholesterol to below 130.    Recently, she was complaining of chest pain localized to the right upper chest. This is atypical for CAD however we will schedule hr for a stress echo.     We will see her back in the office in 3 months with results of her stress echo and lipid panel.    PAST MEDICAL HISTORY:    Coronary calcium score 0, May 2016.    Stress echo in 2009 was a normal study.    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    PAST SURGICAL HISTORY:  None.    CURRENT MEDICATIONS:      Current Outpatient Medications:   •  albuterol 2.5 mg /3 mL (0.083 %) nebulizer solution, inhale 3 milliliter by nebulization route 3 times every day, Disp: , Rfl:   •  albuterol HFA (PROAIR HFA) 90 mcg/actuation inhaler, Inhale 2 puffs 4 (four) times a day as needed., Disp: 3 Inhaler, Rfl: 3  •  cholecalciferol, vitamin D3, (VITAMIN D3) 2,000 unit tablet, 2 (two) times a day.  , Disp: , Rfl:   •  clonazePAM (klonoPIN) 1 mg tablet, Take 1 tablet (1 mg total) by mouth daily., Disp: 90 tablet, Rfl: 0  •  DOCOSAHEXANOIC ACID/EPA (FISH OIL ORAL), take  atotal 3,000  daily, Disp: , Rfl:   •  estrogens, conjugated (CONJUGATED ESTROGENS VAGL), Insert into the vagina., Disp: , Rfl:   •  ezetimibe (ZETIA) 10 mg tablet, Take 1 tablet (10 mg total) by mouth 3 (three) times a week (Mon, Wed, Fri)., Disp: 45 tablet, Rfl: 3  •  famotidine (PEPCID) 20 mg tablet, Take 1 tablet (20 mg total) by mouth once daily., Disp: 90 tablet, Rfl: 3  •   Lactobac no.41/Bifidobact no.7 (PROBIOTIC-10 ORAL), Take by mouth daily., Disp: , Rfl:   •  levothyroxine (SYNTHROID) 100 mcg tablet, TAKE ONE TABLET BY MOUTH EVERY DAY, Disp: 90 tablet, Rfl: 3  •  melatonin 5 mg tablet, Take by mouth nightly.  , Disp: , Rfl:   •  montelukast (SINGULAIR) 10 mg tablet, Take 1 tablet (10 mg total) by mouth nightly., Disp: 90 tablet, Rfl: 3  •  multivitamin tablet, Take by mouth daily., Disp: , Rfl:   •  niacin (ENDUR-ACIN) 500 mg CR tablet, Take 2 tablets (1,000 mg total) by mouth nightly., Disp: 180 tablet, Rfl: 3  •  rosuvastatin (CRESTOR) 5 mg tablet, Take 1 tablet (5 mg total) by mouth 3 (three) times a week (Mon, Wed, Fri)., Disp: 90 tablet, Rfl: 3  •  turmeric-turmeric root extract 450-50 mg capsule, Take by mouth 2 (two) times a day., Disp: , Rfl:   •  verapamil (CALAN) 40 mg tablet, TAKE ONE TABLET BY MOUTH ONCE DAILY WHEN NEEDED, Disp: 90 tablet, Rfl: 3  •  verapamil (VERELAN) 240 mg 24 hr capsule, Take 1 capsule (240 mg total) by mouth nightly., Disp: 90 capsule, Rfl: 3      SUPPLEMENTS:  Omega-3 fish oil, vitamin-D, coenzyme-Q10, Reservitol, curcumin, per Dr. Adrian Yang's recommendations, Endur-Acin 500 mg at dinner.    ALLERGIES:  High-dose statins.  Lipitor 20 mg daily caused significant myalgias.  We are going to attempt reintroducing 10 mg Monday, Wednesday, Friday.    FAMILY HISTORY:  Father had multiple myocardial infarctions in his 30's.  Mother with possible coronary artery disease and stroke.  Brother with hypertension.    SOCIAL HISTORY:  The patient lives with her adopted daughter.  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  Occasional glass of wine.  She quit smoking 15 years ago.    REVIEW OF SYSTEMS:  The patient's palpitations have been well controlled with the use of Verelan.  She follows with Dr. Ofelia Muse.  She has had difficulty with taking high-dose statins as they lead to symptoms of myalgias  and leg pain.  She has a history of asthma and allergies.    PHYSICAL EXAMINATION:  The patient is a middle-aged female in no acute distress.    Weight:  143.  Blood pressure: 116/70  Heart rate:  70.  Temperature: Afebrile.  HEENT:  Unremarkable.  No xanthelasma or arcus.  Neck:  Supple, no JVD.  Lungs:  Clear to auscultation and percussion.  Cardiac:  Regular rate and rhythm without murmur or gallop.  Abdomen:  Soft, bowel sounds present, no organomegaly.  Extremities:  No edema, pulses intact.  Skin:  Warm and dry.  Neuro:  Alert and oriented X 3.    LABORATORY DATA:      Coronary score:  0, 2016, 0 in 2019 , thoracic aortic calcification.      Advanced lipid panel March 2019:  Total cholesterol 218  HDL 73 triglycerides 73 APO B 121.  LP(a) 473.  Inflammation panel: Normal  Endothelial function panel: Abnormal  Metabolic panel: Vitamin D 69  Sterol panel: Hyper absorber of cholesterol.  Normal synthesizer of cholesterol.  Diabetes panel: Insulin resistance  Omega-3 index: 9.1    Lipid panel October 2019: Total cholesterol 227  HDL 75 triglycerides 70.    IMPRESSIONS/RECOMMENDATIONS:  1. Cardiovascular risk assessment.  The patient's coronary calcium score remains 0 which places her in a low risk category.  She remains in a very low risk quartile.  2. Thoracic aortic and carotid arterial calcification.  The patient is at risk for progressive atherosclerosis.  We will repeat her carotid ultrasound.  3. Polygenic hypercholesterolemia.   patient will switch to Crestor 5 mg Monday Wednesday Friday and continue Zetia 10 mg at Tuesday Thursday and Saturday.  Goal is an LDL cholesterol below 130.  4. Elevated LP(a).  The patient will increase her Endur-Acin to 1000 mg at dinner.  5. Vitamin-D deficiency.  Continue vitamin-D supplementation.  6. Statin intolerance.  7. Hyperabsorber of cholesterol.  Continue Zetia.  8. Hypothyroidism.  Continue Synthroid.    Summary: We will see Kelly back in the office in 3  months.  She will continue a low carbohydrate low saturated fat Mediterranean diet.  Certainly her repeat coronary calcium score is 0 places her at a low risk quartile.  She will come back after she completes her stress echo and her repeat lipid panel    This is a 30-minute patient encounter with greater than 50% time spent in care coordination counseling.    Sincerely,  PHOENIX Duggan  1/24/2020    cc: Melonie Guy M.D., [address] [place] [address], [address], [place] [address], FAX: [phone]

## 2020-02-20 RX ORDER — CLONAZEPAM 1 MG/1
1 TABLET ORAL DAILY
Qty: 90 TABLET | Refills: 1 | Status: SHIPPED | OUTPATIENT
Start: 2020-02-20 | End: 2020-08-17

## 2020-02-20 RX ORDER — FAMOTIDINE 20 MG/1
TABLET, FILM COATED ORAL
Qty: 90 TABLET | Refills: 3 | Status: SHIPPED | OUTPATIENT
Start: 2020-02-20 | End: 2020-11-23

## 2020-02-20 NOTE — TELEPHONE ENCOUNTER
Medicine Refill Request    Last Office Visit: 12/4/2019  Next Office Visit: Visit date not found        Current Outpatient Medications:   •  albuterol 2.5 mg /3 mL (0.083 %) nebulizer solution, inhale 3 milliliter by nebulization route 3 times every day, Disp: , Rfl:   •  albuterol HFA (PROAIR HFA) 90 mcg/actuation inhaler, Inhale 2 puffs 4 (four) times a day as needed., Disp: 3 Inhaler, Rfl: 3  •  cholecalciferol, vitamin D3, (VITAMIN D3) 2,000 unit tablet, 2 (two) times a day.  , Disp: , Rfl:   •  clonazePAM (klonoPIN) 1 mg tablet, Take 1 tablet (1 mg total) by mouth daily., Disp: 90 tablet, Rfl: 0  •  DOCOSAHEXANOIC ACID/EPA (FISH OIL ORAL), take  atotal 3,000  daily, Disp: , Rfl:   •  estrogens, conjugated (CONJUGATED ESTROGENS VAGL), Insert into the vagina., Disp: , Rfl:   •  ezetimibe (ZETIA) 10 mg tablet, Take 1 tablet (10 mg total) by mouth 3 (three) times a week (Mon, Wed, Fri)., Disp: 45 tablet, Rfl: 3  •  famotidine (PEPCID) 20 mg tablet, Take 1 tablet (20 mg total) by mouth once daily., Disp: 90 tablet, Rfl: 3  •  Lactobac no.41/Bifidobact no.7 (PROBIOTIC-10 ORAL), Take by mouth daily., Disp: , Rfl:   •  levothyroxine (SYNTHROID) 100 mcg tablet, TAKE ONE TABLET BY MOUTH EVERY DAY, Disp: 90 tablet, Rfl: 3  •  melatonin 5 mg tablet, Take by mouth nightly.  , Disp: , Rfl:   •  montelukast (SINGULAIR) 10 mg tablet, Take 1 tablet (10 mg total) by mouth nightly., Disp: 90 tablet, Rfl: 3  •  multivitamin tablet, Take by mouth daily., Disp: , Rfl:   •  niacin (ENDUR-ACIN) 500 mg CR tablet, Take 2 tablets (1,000 mg total) by mouth nightly., Disp: 180 tablet, Rfl: 3  •  rosuvastatin (CRESTOR) 5 mg tablet, Take 1 tablet (5 mg total) by mouth 3 (three) times a week (Mon, Wed, Fri)., Disp: 90 tablet, Rfl: 3  •  turmeric-turmeric root extract 450-50 mg capsule, Take by mouth 2 (two) times a day., Disp: , Rfl:   •  verapamil (CALAN) 40 mg tablet, TAKE ONE TABLET BY MOUTH ONCE DAILY WHEN NEEDED, Disp: 90 tablet, Rfl:  3  •  verapamil (VERELAN) 240 mg 24 hr capsule, Take 1 capsule (240 mg total) by mouth nightly., Disp: 90 capsule, Rfl: 3      BP Readings from Last 3 Encounters:   01/24/20 116/70   12/04/19 100/62   11/04/19 124/60       Recent Lab results:  Lab Results   Component Value Date    CHOL 227 (H) 10/17/2019   ,   Lab Results   Component Value Date    HDL 75 10/17/2019   ,   Lab Results   Component Value Date    LDLCALC 138 (H) 10/17/2019   ,   Lab Results   Component Value Date    TRIG 70 10/17/2019        Lab Results   Component Value Date    GLUCOSE 85 10/17/2019   , No results found for: HGBA1C      Lab Results   Component Value Date    CREATININE 0.8 10/17/2019       Lab Results   Component Value Date    TSH 3.280 10/28/2019

## 2020-02-25 ENCOUNTER — TELEPHONE (OUTPATIENT)
Dept: CARDIOLOGY | Facility: HOSPITAL | Age: 70
End: 2020-02-25

## 2020-03-10 ENCOUNTER — TELEPHONE (OUTPATIENT)
Dept: CARDIOLOGY | Facility: HOSPITAL | Age: 70
End: 2020-03-10

## 2020-04-06 ENCOUNTER — TELEPHONE (OUTPATIENT)
Dept: CARDIOLOGY | Facility: CLINIC | Age: 70
End: 2020-04-06

## 2020-04-06 NOTE — TELEPHONE ENCOUNTER
Left message for patient    Explained, your safety is our primary consideration as we respond to the Coronavirus (CoVID-19). [facility] is trying to limit face to face appointments to try to keep you and our staff safe during this difficult time. Dr. Saucedo would like to conduct a telephone visit with you at your scheduled appointment time or reschedule your appointment in 4-6 months if there are no urgent or pressing matters.     Spoke with patient over the phone. Patient denies any chest pain, palpitations, SOB or dizziness.     Patient needs to get a stress echo completed before appt    Patient would like to reschedule for the September Assured patient if they were to develop concerning cardiac symptoms, need cardiac related advise or refills on cardiac medication, our office is still available to address those needs. They may call [phone] if anything should arise.

## 2020-04-06 NOTE — TELEPHONE ENCOUNTER
Called pt on 4/6 to reschedule pt is requesting a call back on another day. Pt is not ready to schedule

## 2020-04-07 ENCOUNTER — TELEPHONE (OUTPATIENT)
Dept: CARDIOLOGY | Facility: CLINIC | Age: 70
End: 2020-04-07

## 2020-04-07 NOTE — TELEPHONE ENCOUNTER
Pt declined telemed on 4/7 pt would like to get a call back to reschedule for septemeber . Pt is stating that she's having leg cramp

## 2020-04-21 RX ORDER — LEVOTHYROXINE SODIUM 100 UG/1
TABLET ORAL
Qty: 90 TABLET | Refills: 0 | Status: SHIPPED | OUTPATIENT
Start: 2020-04-21 | End: 2020-11-09

## 2020-04-21 NOTE — TELEPHONE ENCOUNTER
Medicine Refill Request    Last Office Visit: 12/4/2019  Next Office Visit: Visit date not found        Current Outpatient Medications:   •  albuterol 2.5 mg /3 mL (0.083 %) nebulizer solution, inhale 3 milliliter by nebulization route 3 times every day, Disp: , Rfl:   •  albuterol HFA (PROAIR HFA) 90 mcg/actuation inhaler, Inhale 2 puffs 4 (four) times a day as needed., Disp: 3 Inhaler, Rfl: 3  •  cholecalciferol, vitamin D3, (VITAMIN D3) 2,000 unit tablet, 2 (two) times a day.  , Disp: , Rfl:   •  clonazePAM (klonoPIN) 1 mg tablet, TAKE 1 TABLET (1 MG TOTAL) BY MOUTH DAILY., Disp: 90 tablet, Rfl: 1  •  DOCOSAHEXANOIC ACID/EPA (FISH OIL ORAL), take  atotal 3,000  daily, Disp: , Rfl:   •  estrogens, conjugated (CONJUGATED ESTROGENS VAGL), Insert into the vagina., Disp: , Rfl:   •  ezetimibe (ZETIA) 10 mg tablet, Take 1 tablet (10 mg total) by mouth 3 (three) times a week (Mon, Wed, Fri)., Disp: 45 tablet, Rfl: 3  •  famotidine (PEPCID) 20 mg tablet, TAKE ONE TABLET BY MOUTH DAILY, Disp: 90 tablet, Rfl: 3  •  Lactobac no.41/Bifidobact no.7 (PROBIOTIC-10 ORAL), Take by mouth daily., Disp: , Rfl:   •  levothyroxine (SYNTHROID) 100 mcg tablet, TAKE ONE TABLET BY MOUTH EVERY DAY, Disp: 90 tablet, Rfl: 3  •  melatonin 5 mg tablet, Take by mouth nightly.  , Disp: , Rfl:   •  montelukast (SINGULAIR) 10 mg tablet, Take 1 tablet (10 mg total) by mouth nightly., Disp: 90 tablet, Rfl: 3  •  multivitamin tablet, Take by mouth daily., Disp: , Rfl:   •  niacin (ENDUR-ACIN) 500 mg CR tablet, Take 2 tablets (1,000 mg total) by mouth nightly., Disp: 180 tablet, Rfl: 3  •  rosuvastatin (CRESTOR) 5 mg tablet, Take 1 tablet (5 mg total) by mouth 3 (three) times a week (Mon, Wed, Fri)., Disp: 90 tablet, Rfl: 3  •  turmeric-turmeric root extract 450-50 mg capsule, Take by mouth 2 (two) times a day., Disp: , Rfl:   •  verapamil (CALAN) 40 mg tablet, TAKE ONE TABLET BY MOUTH ONCE DAILY WHEN NEEDED, Disp: 90 tablet, Rfl: 3  •  verapamil  (VERELAN) 240 mg 24 hr capsule, Take 1 capsule (240 mg total) by mouth nightly., Disp: 90 capsule, Rfl: 3      BP Readings from Last 3 Encounters:   01/24/20 116/70   12/04/19 100/62   11/04/19 124/60       Recent Lab results:  Lab Results   Component Value Date    CHOL 227 (H) 10/17/2019   ,   Lab Results   Component Value Date    HDL 75 10/17/2019   ,   Lab Results   Component Value Date    LDLCALC 138 (H) 10/17/2019   ,   Lab Results   Component Value Date    TRIG 70 10/17/2019        Lab Results   Component Value Date    GLUCOSE 85 10/17/2019   , No results found for: HGBA1C      Lab Results   Component Value Date    CREATININE 0.8 10/17/2019       Lab Results   Component Value Date    TSH 3.280 10/28/2019

## 2020-05-19 RX ORDER — VERAPAMIL HCL 240 MG
TABLET, EXTENDED RELEASE ORAL
Qty: 90 TABLET | Refills: 0 | Status: SHIPPED | OUTPATIENT
Start: 2020-05-19 | End: 2020-09-01 | Stop reason: SDUPTHER

## 2020-05-19 NOTE — TELEPHONE ENCOUNTER
Patient called back in reference to the message that she needed to make an appt  I did explain her last visit was in 12/19 and that we would like to discuss her medications and follow up through a telemed  She stated at this time there is no reason for that and doesn't have to come into the office for refills I told her I would send message along and if a telemed is required I will call her back   Please advise

## 2020-05-19 NOTE — TELEPHONE ENCOUNTER
Medicine Refill Request    Last Office Visit: 12/4/2019  Last Telemedicine Visit: Visit date not found    Next Office Visit: Visit date not found  Next Telemedicine Visit: Visit date not found         Current Outpatient Medications:   •  albuterol 2.5 mg /3 mL (0.083 %) nebulizer solution, inhale 3 milliliter by nebulization route 3 times every day, Disp: , Rfl:   •  albuterol HFA (PROAIR HFA) 90 mcg/actuation inhaler, Inhale 2 puffs 4 (four) times a day as needed., Disp: 3 Inhaler, Rfl: 3  •  cholecalciferol, vitamin D3, (VITAMIN D3) 2,000 unit tablet, 2 (two) times a day.  , Disp: , Rfl:   •  clonazePAM (klonoPIN) 1 mg tablet, TAKE 1 TABLET (1 MG TOTAL) BY MOUTH DAILY., Disp: 90 tablet, Rfl: 1  •  DOCOSAHEXANOIC ACID/EPA (FISH OIL ORAL), take  atotal 3,000  daily, Disp: , Rfl:   •  estrogens, conjugated (CONJUGATED ESTROGENS VAGL), Insert into the vagina., Disp: , Rfl:   •  ezetimibe (ZETIA) 10 mg tablet, Take 1 tablet (10 mg total) by mouth 3 (three) times a week (Mon, Wed, Fri)., Disp: 45 tablet, Rfl: 3  •  famotidine (PEPCID) 20 mg tablet, TAKE ONE TABLET BY MOUTH DAILY, Disp: 90 tablet, Rfl: 3  •  Lactobac no.41/Bifidobact no.7 (PROBIOTIC-10 ORAL), Take by mouth daily., Disp: , Rfl:   •  levothyroxine (SYNTHROID) 100 mcg tablet, TAKE ONE TABLET BY MOUTH EVERY DAY, Disp: 90 tablet, Rfl: 0  •  melatonin 5 mg tablet, Take by mouth nightly.  , Disp: , Rfl:   •  montelukast (SINGULAIR) 10 mg tablet, Take 1 tablet (10 mg total) by mouth nightly., Disp: 90 tablet, Rfl: 3  •  multivitamin tablet, Take by mouth daily., Disp: , Rfl:   •  niacin (ENDUR-ACIN) 500 mg CR tablet, Take 2 tablets (1,000 mg total) by mouth nightly., Disp: 180 tablet, Rfl: 3  •  rosuvastatin (CRESTOR) 5 mg tablet, Take 1 tablet (5 mg total) by mouth 3 (three) times a week (Mon, Wed, Fri)., Disp: 90 tablet, Rfl: 3  •  turmeric-turmeric root extract 450-50 mg capsule, Take by mouth 2 (two) times a day., Disp: , Rfl:   •  verapamil (CALAN) 40 mg  tablet, TAKE ONE TABLET BY MOUTH ONCE DAILY WHEN NEEDED, Disp: 90 tablet, Rfl: 3  •  verapamil (VERELAN) 240 mg 24 hr capsule, Take 1 capsule (240 mg total) by mouth nightly., Disp: 90 capsule, Rfl: 3      BP Readings from Last 3 Encounters:   01/24/20 116/70   12/04/19 100/62   11/04/19 124/60       Recent Lab results:  Lab Results   Component Value Date    CHOL 227 (H) 10/17/2019   ,   Lab Results   Component Value Date    HDL 75 10/17/2019   ,   Lab Results   Component Value Date    LDLCALC 138 (H) 10/17/2019   ,   Lab Results   Component Value Date    TRIG 70 10/17/2019        Lab Results   Component Value Date    GLUCOSE 85 10/17/2019   , No results found for: HGBA1C      Lab Results   Component Value Date    CREATININE 0.8 10/17/2019       Lab Results   Component Value Date    TSH 3.280 10/28/2019

## 2020-08-17 RX ORDER — CLONAZEPAM 1 MG/1
1 TABLET ORAL DAILY
Qty: 90 TABLET | Refills: 1 | Status: SHIPPED | OUTPATIENT
Start: 2020-08-17 | End: 2021-03-10

## 2020-08-17 RX ORDER — VERAPAMIL HYDROCHLORIDE 40 MG/1
TABLET ORAL
Qty: 90 TABLET | Refills: 1 | Status: SHIPPED | OUTPATIENT
Start: 2020-08-17 | End: 2021-11-09 | Stop reason: SDUPTHER

## 2020-08-17 NOTE — TELEPHONE ENCOUNTER
Medicine Refill Request    Last Office Visit: 12/4/2019  Last Telemedicine Visit: Visit date not found    Next Office Visit: Visit date not found  Next Telemedicine Visit: Visit date not found         Current Outpatient Medications:   •  albuterol 2.5 mg /3 mL (0.083 %) nebulizer solution, inhale 3 milliliter by nebulization route 3 times every day, Disp: , Rfl:   •  albuterol HFA (PROAIR HFA) 90 mcg/actuation inhaler, Inhale 2 puffs 4 (four) times a day as needed., Disp: 3 Inhaler, Rfl: 3  •  cholecalciferol, vitamin D3, (VITAMIN D3) 2,000 unit tablet, 2 (two) times a day.  , Disp: , Rfl:   •  clonazePAM (klonoPIN) 1 mg tablet, TAKE 1 TABLET (1 MG TOTAL) BY MOUTH DAILY., Disp: 90 tablet, Rfl: 1  •  DOCOSAHEXANOIC ACID/EPA (FISH OIL ORAL), take  atotal 3,000  daily, Disp: , Rfl:   •  estrogens, conjugated (CONJUGATED ESTROGENS VAGL), Insert into the vagina., Disp: , Rfl:   •  ezetimibe (ZETIA) 10 mg tablet, Take 1 tablet (10 mg total) by mouth 3 (three) times a week (Mon, Wed, Fri)., Disp: 45 tablet, Rfl: 3  •  famotidine (PEPCID) 20 mg tablet, TAKE ONE TABLET BY MOUTH DAILY, Disp: 90 tablet, Rfl: 3  •  Lactobac no.41/Bifidobact no.7 (PROBIOTIC-10 ORAL), Take by mouth daily., Disp: , Rfl:   •  levothyroxine (SYNTHROID) 100 mcg tablet, TAKE ONE TABLET BY MOUTH EVERY DAY, Disp: 90 tablet, Rfl: 0  •  melatonin 5 mg tablet, Take by mouth nightly.  , Disp: , Rfl:   •  montelukast (SINGULAIR) 10 mg tablet, Take 1 tablet (10 mg total) by mouth nightly., Disp: 90 tablet, Rfl: 3  •  multivitamin tablet, Take by mouth daily., Disp: , Rfl:   •  niacin (ENDUR-ACIN) 500 mg CR tablet, Take 2 tablets (1,000 mg total) by mouth nightly., Disp: 180 tablet, Rfl: 3  •  rosuvastatin (CRESTOR) 5 mg tablet, Take 1 tablet (5 mg total) by mouth 3 (three) times a week (Mon, Wed, Fri)., Disp: 90 tablet, Rfl: 3  •  turmeric-turmeric root extract 450-50 mg capsule, Take by mouth 2 (two) times a day., Disp: , Rfl:   •  verapamil (CALAN) 40 mg  tablet, TAKE ONE TABLET BY MOUTH ONCE DAILY WHEN NEEDED, Disp: 90 tablet, Rfl: 3  •  verapamil (VERELAN) 240 mg 24 hr capsule, Take 1 capsule (240 mg total) by mouth nightly., Disp: 90 capsule, Rfl: 3  •  verapamil SR (CALAN-SR) 240 mg CR tablet, TAKE 1 TABLET (240 MG) BY MOUTH DAILY, Disp: 90 tablet, Rfl: 0      BP Readings from Last 3 Encounters:   01/24/20 116/70   12/04/19 100/62   11/04/19 124/60       Recent Lab results:  Lab Results   Component Value Date    CHOL 227 (H) 10/17/2019   ,   Lab Results   Component Value Date    HDL 75 10/17/2019   ,   Lab Results   Component Value Date    LDLCALC 138 (H) 10/17/2019   ,   Lab Results   Component Value Date    TRIG 70 10/17/2019        Lab Results   Component Value Date    GLUCOSE 85 10/17/2019   , No results found for: HGBA1C      Lab Results   Component Value Date    CREATININE 0.8 10/17/2019       Lab Results   Component Value Date    TSH 3.280 10/28/2019

## 2020-09-01 RX ORDER — VERAPAMIL HCL 240 MG
TABLET, EXTENDED RELEASE ORAL
Qty: 90 TABLET | Refills: 0 | Status: SHIPPED | OUTPATIENT
Start: 2020-09-01 | End: 2020-12-04 | Stop reason: SDUPTHER

## 2020-09-01 NOTE — TELEPHONE ENCOUNTER
Medicine Refill Request    Last Office Visit: 12/4/2019  Last Telemedicine Visit: Visit date not found    Next Office Visit: Visit date not found  Next Telemedicine Visit: Visit date not found         Current Outpatient Medications:   •  albuterol 2.5 mg /3 mL (0.083 %) nebulizer solution, inhale 3 milliliter by nebulization route 3 times every day, Disp: , Rfl:   •  albuterol HFA (PROAIR HFA) 90 mcg/actuation inhaler, Inhale 2 puffs 4 (four) times a day as needed., Disp: 3 Inhaler, Rfl: 3  •  cholecalciferol, vitamin D3, (VITAMIN D3) 2,000 unit tablet, 2 (two) times a day.  , Disp: , Rfl:   •  clonazePAM (klonoPIN) 1 mg tablet, TAKE 1 TABLET (1 MG TOTAL) BY MOUTH DAILY., Disp: 90 tablet, Rfl: 1  •  DOCOSAHEXANOIC ACID/EPA (FISH OIL ORAL), take  atotal 3,000  daily, Disp: , Rfl:   •  estrogens, conjugated (CONJUGATED ESTROGENS VAGL), Insert into the vagina., Disp: , Rfl:   •  ezetimibe (ZETIA) 10 mg tablet, Take 1 tablet (10 mg total) by mouth 3 (three) times a week (Mon, Wed, Fri)., Disp: 45 tablet, Rfl: 3  •  famotidine (PEPCID) 20 mg tablet, TAKE ONE TABLET BY MOUTH DAILY, Disp: 90 tablet, Rfl: 3  •  Lactobac no.41/Bifidobact no.7 (PROBIOTIC-10 ORAL), Take by mouth daily., Disp: , Rfl:   •  levothyroxine (SYNTHROID) 100 mcg tablet, TAKE ONE TABLET BY MOUTH EVERY DAY, Disp: 90 tablet, Rfl: 0  •  melatonin 5 mg tablet, Take by mouth nightly.  , Disp: , Rfl:   •  montelukast (SINGULAIR) 10 mg tablet, Take 1 tablet (10 mg total) by mouth nightly., Disp: 90 tablet, Rfl: 3  •  multivitamin tablet, Take by mouth daily., Disp: , Rfl:   •  niacin (ENDUR-ACIN) 500 mg CR tablet, Take 2 tablets (1,000 mg total) by mouth nightly., Disp: 180 tablet, Rfl: 3  •  rosuvastatin (CRESTOR) 5 mg tablet, Take 1 tablet (5 mg total) by mouth 3 (three) times a week (Mon, Wed, Fri)., Disp: 90 tablet, Rfl: 3  •  turmeric-turmeric root extract 450-50 mg capsule, Take by mouth 2 (two) times a day., Disp: , Rfl:   •  verapamiL (CALAN) 40 mg  tablet, TAKE ONE TABLET BY MOUTH ONCE DAILY WHEN NEEDED, Disp: 90 tablet, Rfl: 1  •  verapamil (VERELAN) 240 mg 24 hr capsule, Take 1 capsule (240 mg total) by mouth nightly., Disp: 90 capsule, Rfl: 3  •  verapamil SR (CALAN-SR) 240 mg CR tablet, TAKE 1 TABLET (240 MG) BY MOUTH DAILY, Disp: 90 tablet, Rfl: 0      BP Readings from Last 3 Encounters:   01/24/20 116/70   12/04/19 100/62   11/04/19 124/60       Recent Lab results:  Lab Results   Component Value Date    CHOL 227 (H) 10/17/2019   ,   Lab Results   Component Value Date    HDL 75 10/17/2019   ,   Lab Results   Component Value Date    LDLCALC 138 (H) 10/17/2019   ,   Lab Results   Component Value Date    TRIG 70 10/17/2019        Lab Results   Component Value Date    GLUCOSE 85 10/17/2019   , No results found for: HGBA1C      Lab Results   Component Value Date    CREATININE 0.8 10/17/2019       Lab Results   Component Value Date    TSH 3.280 10/28/2019

## 2020-11-13 ENCOUNTER — TELEPHONE (OUTPATIENT)
Dept: FAMILY MEDICINE | Facility: CLINIC | Age: 70
End: 2020-11-13

## 2020-11-13 DIAGNOSIS — I10 ESSENTIAL HYPERTENSION: ICD-10-CM

## 2020-11-13 DIAGNOSIS — I73.00 RAYNAUD'S DISEASE WITHOUT GANGRENE: ICD-10-CM

## 2020-11-13 DIAGNOSIS — E55.9 VITAMIN D DEFICIENCY: ICD-10-CM

## 2020-11-13 DIAGNOSIS — E78.00 PURE HYPERCHOLESTEROLEMIA: Primary | ICD-10-CM

## 2020-11-13 DIAGNOSIS — E03.9 ACQUIRED HYPOTHYROIDISM: ICD-10-CM

## 2020-11-13 DIAGNOSIS — E78.5 DYSLIPIDEMIA: ICD-10-CM

## 2020-11-23 RX ORDER — FAMOTIDINE 20 MG/1
TABLET, FILM COATED ORAL
Qty: 90 TABLET | Refills: 1 | Status: SHIPPED | OUTPATIENT
Start: 2020-11-23 | End: 2021-04-15 | Stop reason: SDUPTHER

## 2020-11-23 RX ORDER — MONTELUKAST SODIUM 10 MG/1
10 TABLET ORAL NIGHTLY
Qty: 90 TABLET | Refills: 1 | Status: SHIPPED | OUTPATIENT
Start: 2020-11-23 | End: 2021-06-01

## 2020-11-23 NOTE — TELEPHONE ENCOUNTER
Medicine Refill Request    Last Office Visit: 12/4/2019  Last Telemedicine Visit: Visit date not found    Next Office Visit: 12/16/2020  Next Telemedicine Visit: Visit date not found         Current Outpatient Medications:   •  albuterol 2.5 mg /3 mL (0.083 %) nebulizer solution, inhale 3 milliliter by nebulization route 3 times every day, Disp: , Rfl:   •  albuterol HFA (PROAIR HFA) 90 mcg/actuation inhaler, Inhale 2 puffs 4 (four) times a day as needed., Disp: 3 Inhaler, Rfl: 3  •  cholecalciferol, vitamin D3, (VITAMIN D3) 2,000 unit tablet, 2 (two) times a day.  , Disp: , Rfl:   •  clonazePAM (klonoPIN) 1 mg tablet, TAKE 1 TABLET (1 MG TOTAL) BY MOUTH DAILY., Disp: 90 tablet, Rfl: 1  •  DOCOSAHEXANOIC ACID/EPA (FISH OIL ORAL), take  atotal 3,000  daily, Disp: , Rfl:   •  estrogens, conjugated (CONJUGATED ESTROGENS VAGL), Insert into the vagina., Disp: , Rfl:   •  ezetimibe (ZETIA) 10 mg tablet, Take 1 tablet (10 mg total) by mouth 3 (three) times a week (Mon, Wed, Fri)., Disp: 45 tablet, Rfl: 3  •  famotidine (PEPCID) 20 mg tablet, TAKE ONE TABLET BY MOUTH DAILY, Disp: 90 tablet, Rfl: 3  •  Lactobac no.41/Bifidobact no.7 (PROBIOTIC-10 ORAL), Take by mouth daily., Disp: , Rfl:   •  levothyroxine (SYNTHROID) 100 mcg tablet, TAKE ONE TABLET BY MOUTH EVERY DAY, Disp: 90 tablet, Rfl: 3  •  melatonin 5 mg tablet, Take by mouth nightly.  , Disp: , Rfl:   •  montelukast (SINGULAIR) 10 mg tablet, Take 1 tablet (10 mg total) by mouth nightly., Disp: 90 tablet, Rfl: 3  •  multivitamin tablet, Take by mouth daily., Disp: , Rfl:   •  niacin (ENDUR-ACIN) 500 mg CR tablet, Take 2 tablets (1,000 mg total) by mouth nightly., Disp: 180 tablet, Rfl: 3  •  rosuvastatin (CRESTOR) 5 mg tablet, Take 1 tablet (5 mg total) by mouth 3 (three) times a week (Mon, Wed, Fri)., Disp: 90 tablet, Rfl: 3  •  turmeric-turmeric root extract 450-50 mg capsule, Take by mouth 2 (two) times a day., Disp: , Rfl:   •  verapamiL (CALAN) 40 mg tablet, TAKE  ONE TABLET BY MOUTH ONCE DAILY WHEN NEEDED, Disp: 90 tablet, Rfl: 1  •  verapamil (VERELAN) 240 mg 24 hr capsule, Take 1 capsule (240 mg total) by mouth nightly., Disp: 90 capsule, Rfl: 3  •  verapamil SR (CALAN-SR) 240 mg CR tablet, TAKE ONE TABLET BY MOUTH DAILY, Disp: 90 tablet, Rfl: 0      BP Readings from Last 3 Encounters:   01/24/20 116/70   12/04/19 100/62   11/04/19 124/60       Recent Lab results:  Lab Results   Component Value Date    CHOL 227 (H) 10/17/2019   ,   Lab Results   Component Value Date    HDL 75 10/17/2019   ,   Lab Results   Component Value Date    LDLCALC 138 (H) 10/17/2019   ,   Lab Results   Component Value Date    TRIG 70 10/17/2019        Lab Results   Component Value Date    GLUCOSE 85 10/17/2019   , No results found for: HGBA1C      Lab Results   Component Value Date    CREATININE 0.8 10/17/2019       Lab Results   Component Value Date    TSH 3.280 10/28/2019

## 2020-12-04 ENCOUNTER — TELEPHONE (OUTPATIENT)
Dept: FAMILY MEDICINE | Facility: CLINIC | Age: 70
End: 2020-12-04

## 2020-12-04 RX ORDER — VERAPAMIL HCL 240 MG
TABLET, EXTENDED RELEASE ORAL
Qty: 90 TABLET | Refills: 1 | Status: SHIPPED | OUTPATIENT
Start: 2020-12-04 | End: 2020-12-21

## 2020-12-04 NOTE — TELEPHONE ENCOUNTER
Medicine Refill Request    Last Office Visit: 12/4/2019  Last Telemedicine Visit: Visit date not found    Next Office Visit: 12/16/2020  Next Telemedicine Visit: Visit date not found         Current Outpatient Medications:   •  albuterol 2.5 mg /3 mL (0.083 %) nebulizer solution, inhale 3 milliliter by nebulization route 3 times every day, Disp: , Rfl:   •  albuterol HFA (PROAIR HFA) 90 mcg/actuation inhaler, Inhale 2 puffs 4 (four) times a day as needed., Disp: 3 Inhaler, Rfl: 3  •  cholecalciferol, vitamin D3, (VITAMIN D3) 2,000 unit tablet, 2 (two) times a day.  , Disp: , Rfl:   •  clonazePAM (klonoPIN) 1 mg tablet, TAKE 1 TABLET (1 MG TOTAL) BY MOUTH DAILY., Disp: 90 tablet, Rfl: 1  •  DOCOSAHEXANOIC ACID/EPA (FISH OIL ORAL), take  atotal 3,000  daily, Disp: , Rfl:   •  estrogens, conjugated (CONJUGATED ESTROGENS VAGL), Insert into the vagina., Disp: , Rfl:   •  ezetimibe (ZETIA) 10 mg tablet, Take 1 tablet (10 mg total) by mouth 3 (three) times a week (Mon, Wed, Fri)., Disp: 45 tablet, Rfl: 3  •  famotidine (PEPCID) 20 mg tablet, TAKE 1 TABLET (20 MG TOTAL) BY MOUTH ONCE DAILY., Disp: 90 tablet, Rfl: 1  •  Lactobac no.41/Bifidobact no.7 (PROBIOTIC-10 ORAL), Take by mouth daily., Disp: , Rfl:   •  levothyroxine (SYNTHROID) 100 mcg tablet, TAKE ONE TABLET BY MOUTH EVERY DAY, Disp: 90 tablet, Rfl: 3  •  melatonin 5 mg tablet, Take by mouth nightly.  , Disp: , Rfl:   •  montelukast (SINGULAIR) 10 mg tablet, TAKE 1 TABLET (10 MG TOTAL) BY MOUTH NIGHTLY., Disp: 90 tablet, Rfl: 1  •  multivitamin tablet, Take by mouth daily., Disp: , Rfl:   •  niacin (ENDUR-ACIN) 500 mg CR tablet, Take 2 tablets (1,000 mg total) by mouth nightly., Disp: 180 tablet, Rfl: 3  •  rosuvastatin (CRESTOR) 5 mg tablet, Take 1 tablet (5 mg total) by mouth 3 (three) times a week (Mon, Wed, Fri)., Disp: 90 tablet, Rfl: 3  •  turmeric-turmeric root extract 450-50 mg capsule, Take by mouth 2 (two) times a day., Disp: , Rfl:   •  verapamiL (CALAN)  40 mg tablet, TAKE ONE TABLET BY MOUTH ONCE DAILY WHEN NEEDED, Disp: 90 tablet, Rfl: 1  •  verapamil (VERELAN) 240 mg 24 hr capsule, Take 1 capsule (240 mg total) by mouth nightly., Disp: 90 capsule, Rfl: 3  •  verapamil SR (CALAN-SR) 240 mg CR tablet, TAKE ONE TABLET BY MOUTH DAILY, Disp: 90 tablet, Rfl: 0      BP Readings from Last 3 Encounters:   01/24/20 116/70   12/04/19 100/62   11/04/19 124/60       Recent Lab results:  Lab Results   Component Value Date    CHOL 227 (H) 10/17/2019   ,   Lab Results   Component Value Date    HDL 75 10/17/2019   ,   Lab Results   Component Value Date    LDLCALC 138 (H) 10/17/2019   ,   Lab Results   Component Value Date    TRIG 70 10/17/2019        Lab Results   Component Value Date    GLUCOSE 85 10/17/2019   , No results found for: HGBA1C      Lab Results   Component Value Date    CREATININE 0.8 10/17/2019       Lab Results   Component Value Date    TSH 3.280 10/28/2019

## 2020-12-21 ENCOUNTER — OFFICE VISIT (OUTPATIENT)
Dept: FAMILY MEDICINE | Facility: CLINIC | Age: 70
End: 2020-12-21
Payer: MEDICARE

## 2020-12-21 VITALS
BODY MASS INDEX: 25.58 KG/M2 | RESPIRATION RATE: 18 BRPM | HEIGHT: 62 IN | DIASTOLIC BLOOD PRESSURE: 60 MMHG | WEIGHT: 139 LBS | SYSTOLIC BLOOD PRESSURE: 112 MMHG | HEART RATE: 77 BPM | OXYGEN SATURATION: 97 %

## 2020-12-21 DIAGNOSIS — E72.11 HOMOCYSTINURIA (CMS/HCC): ICD-10-CM

## 2020-12-21 DIAGNOSIS — E78.5 HYPERLIPIDEMIA, UNSPECIFIED HYPERLIPIDEMIA TYPE: ICD-10-CM

## 2020-12-21 DIAGNOSIS — M85.89 OSTEOPENIA OF MULTIPLE SITES: ICD-10-CM

## 2020-12-21 DIAGNOSIS — J45.909 ASTHMA, UNSPECIFIED ASTHMA SEVERITY, UNSPECIFIED WHETHER COMPLICATED, UNSPECIFIED WHETHER PERSISTENT: ICD-10-CM

## 2020-12-21 DIAGNOSIS — Z00.00 MEDICARE ANNUAL WELLNESS VISIT, SUBSEQUENT: Primary | ICD-10-CM

## 2020-12-21 DIAGNOSIS — I47.10 SUPRAVENTRICULAR TACHYCARDIA (CMS/HCC): ICD-10-CM

## 2020-12-21 DIAGNOSIS — I65.23 ARTERIOSCLEROSIS OF BOTH CAROTID ARTERIES: ICD-10-CM

## 2020-12-21 DIAGNOSIS — E55.9 VITAMIN D DEFICIENCY: ICD-10-CM

## 2020-12-21 DIAGNOSIS — E03.9 ACQUIRED HYPOTHYROIDISM: ICD-10-CM

## 2020-12-21 PROCEDURE — G0439 PPPS, SUBSEQ VISIT: HCPCS | Performed by: INTERNAL MEDICINE

## 2020-12-21 PROCEDURE — 99214 OFFICE O/P EST MOD 30 MIN: CPT | Mod: 25 | Performed by: INTERNAL MEDICINE

## 2020-12-21 SDOH — HEALTH STABILITY: MENTAL HEALTH: HOW MANY DRINKS CONTAINING ALCOHOL DO YOU HAVE ON A TYPICAL DAY WHEN YOU ARE DRINKING?: 1 OR 2

## 2020-12-21 ASSESSMENT — MINI COG
COMPLETED: YES
TOTAL SCORE: 5

## 2020-12-21 ASSESSMENT — ENCOUNTER SYMPTOMS
CONSTIPATION: 0
CARDIOVASCULAR NEGATIVE: 1
FREQUENCY: 0
ARTHRALGIAS: 1
SORE THROAT: 0
DYSURIA: 0
NAUSEA: 0
VOMITING: 0
HEMATOLOGIC/LYMPHATIC NEGATIVE: 1
DECREASED CONCENTRATION: 0
EYE REDNESS: 0
SLEEP DISTURBANCE: 0
FLANK PAIN: 0
DYSPHORIC MOOD: 0
TROUBLE SWALLOWING: 0
ABDOMINAL PAIN: 0
RESPIRATORY NEGATIVE: 1
DIARRHEA: 0
CONSTITUTIONAL NEGATIVE: 1
AGITATION: 0
WOUND: 0

## 2020-12-21 ASSESSMENT — PATIENT HEALTH QUESTIONNAIRE - PHQ9: SUM OF ALL RESPONSES TO PHQ9 QUESTIONS 1 & 2: 0

## 2020-12-21 NOTE — PROGRESS NOTES
"  Subjective     Patient ID: Kelly Meeks is a 70 y.o. female.    She is here for an MAW and review of chronic conditions    She sees a variety of specialists:    Dr Saucedo - lipid specialist   Dr Muse - EP   Dr Duque - gyn  Dr Goldman- PMR   Dr Daly- retinal specialist    Has had two falls in past few months - saw podiatry, got cortisone injections   Now having some discomfort along lat left leg     Feels she has gained weight, trying to exercise       Review of Systems   Constitutional: Negative.    HENT: Negative for ear pain, nosebleeds, sore throat, tinnitus and trouble swallowing.    Eyes: Positive for visual disturbance. Negative for redness.   Respiratory: Negative.    Cardiovascular: Negative.    Gastrointestinal: Negative for abdominal pain, constipation, diarrhea, nausea and vomiting.   Endocrine: Negative for cold intolerance and heat intolerance.   Genitourinary: Negative for dysuria, flank pain, frequency and urgency.   Musculoskeletal: Positive for arthralgias and gait problem.   Skin: Negative for rash and wound.   Allergic/Immunologic: Negative for environmental allergies and food allergies.   Hematological: Negative.    Psychiatric/Behavioral: Negative for agitation, decreased concentration, dysphoric mood and sleep disturbance.       Objective     Vitals:    12/21/20 1102   BP: 112/60   BP Location: Left upper arm   Patient Position: Sitting   Pulse: 77   Resp: 18   SpO2: 97%   Weight: 63 kg (139 lb)   Height: 1.562 m (5' 1.5\")     Body mass index is 25.84 kg/m².    Physical Exam  Constitutional:       General: She is not in acute distress.     Appearance: She is well-developed.   HENT:      Head: Normocephalic.      Nose: Nose normal.   Eyes:      Conjunctiva/sclera: Conjunctivae normal.      Pupils: Pupils are equal, round, and reactive to light.   Neck:      Musculoskeletal: Normal range of motion.      Thyroid: No thyromegaly.      Vascular: No JVD.   Cardiovascular:      Rate and " Rhythm: Normal rate and regular rhythm.      Heart sounds: Normal heart sounds. No murmur.   Pulmonary:      Effort: Pulmonary effort is normal. No respiratory distress.      Breath sounds: Normal breath sounds. No stridor. No wheezing.      Comments: Scattered faint wheezing   Abdominal:      General: Bowel sounds are normal. There is no distension.      Palpations: Abdomen is soft. There is no mass.      Tenderness: There is no abdominal tenderness.      Hernia: No hernia is present.   Musculoskeletal: Normal range of motion.         General: No deformity.   Lymphadenopathy:      Cervical: No cervical adenopathy.   Skin:     General: Skin is warm and dry.      Capillary Refill: Capillary refill takes less than 2 seconds.      Findings: No rash.   Neurological:      Mental Status: She is alert and oriented to person, place, and time.      Cranial Nerves: No cranial nerve deficit.      Coordination: Coordination normal.   Psychiatric:         Behavior: Behavior normal.         Thought Content: Thought content normal.         Judgment: Judgment normal.         Assessment/Plan   Diagnoses and all orders for this visit:    Medicare annual wellness visit, subsequent (Primary)  Comments:  will go for DEXA, also will get Shingrix, will see gyn in fall, encouraged to exercise more regularly incorporate yoga, all other HM are UTD, check labs today     Acquired hypothyroidism    Supraventricular tachycardia (CMS/HCC)  Assessment & Plan:  Controlled with medication   Remote history of symptoms - no issues in over 5 years       Asthma, unspecified asthma severity, unspecified whether complicated, unspecified whether persistent  Assessment & Plan:  Reliant on Singulair, uses Pro Air infrequently       Homocystinuria (CMS/HCC)    Osteopenia of multiple sites  -     DEXA BONE DENSITY; Future    Arteriosclerosis of both carotid arteries  Assessment & Plan:  Sending her for carotid US    Orders:  -     Ultrasound carotid bilateral;  Future    Other orders  -     conjugated estrogens (PREMARIN) 0.625 mg/gram vaginal cream; Insert into the vagina daily.

## 2020-12-23 RX ORDER — ESTRADIOL 0.1 MG/G
2 CREAM VAGINAL NIGHTLY
Qty: 42.5 G | Refills: 3 | Status: SHIPPED | OUTPATIENT
Start: 2020-12-23 | End: 2022-06-01

## 2020-12-29 ENCOUNTER — TELEPHONE (OUTPATIENT)
Dept: CARDIOLOGY | Facility: CLINIC | Age: 70
End: 2020-12-29

## 2020-12-29 NOTE — TELEPHONE ENCOUNTER
Massiel, I spoke with Mrs. Meeks, she stated Dr. Saucedo ordered her labs, in which someone can go to her house to draw them. There is no labs ordered by Dr. Saucedo, only her  PCP  on 12/ 21/2020. If she needs a [facility] can you call her @ [phone] and explain the test to her. Also patient doesn't want to go to [facility] or [facility] for the test, BB

## 2020-12-30 ENCOUNTER — TELEPHONE (OUTPATIENT)
Dept: CARDIOLOGY | Facility: CLINIC | Age: 70
End: 2020-12-30

## 2020-12-30 RX ORDER — ROSUVASTATIN CALCIUM 5 MG/1
5 TABLET, COATED ORAL 3 TIMES WEEKLY
Qty: 36 TABLET | Refills: 3 | Status: SHIPPED | OUTPATIENT
Start: 2020-12-30 | End: 2021-04-15 | Stop reason: SDUPTHER

## 2020-12-30 NOTE — TELEPHONE ENCOUNTER
From January 2020 visit Summary: We will see Kelly back in the office in 3 months.  She will continue a low carbohydrate low saturated fat Mediterranean diet.  Certainly her repeat coronary calcium score is 0 places her at a low risk quartile.  She will come back after she completes her stress echo and her repeat lipid panel    She does have a basic lipid panel in the system. She had an advanced lipid panel in 2019 she is welcome to get another but will incur cost if home draw. Also we want her to have a stress echo which needs to be done on site. She can just wait until she is comfortable/vaccinated to proceed.

## 2020-12-30 NOTE — TELEPHONE ENCOUNTER
Patient would like to be scheduled for stress echo and office visit at [place] office sometime in April poss May. Thank you

## 2020-12-30 NOTE — TELEPHONE ENCOUNTER
LMOM for patient to give me a call back in regards to scheduling Stress echo with OV. I offered 9:30 for SEC and 10:30am OV on 5/12/21 at [facility] office.     [facility] is on hold for her.     Erika

## 2020-12-31 DIAGNOSIS — R01.1 MURMUR: ICD-10-CM

## 2020-12-31 DIAGNOSIS — I47.10 SUPRAVENTRICULAR TACHYCARDIA (CMS/HCC): Primary | ICD-10-CM

## 2020-12-31 DIAGNOSIS — I65.23 ARTERIOSCLEROSIS OF BOTH CAROTID ARTERIES: ICD-10-CM

## 2021-02-03 ENCOUNTER — TELEPHONE (OUTPATIENT)
Dept: FAMILY MEDICINE | Facility: CLINIC | Age: 71
End: 2021-02-03

## 2021-02-03 ENCOUNTER — TELEMEDICINE (OUTPATIENT)
Dept: FAMILY MEDICINE | Facility: CLINIC | Age: 71
End: 2021-02-03
Payer: MEDICARE

## 2021-02-03 DIAGNOSIS — J45.901 PERSISTENT ASTHMA WITH ACUTE EXACERBATION, UNSPECIFIED ASTHMA SEVERITY: Primary | ICD-10-CM

## 2021-02-03 DIAGNOSIS — I47.10 SUPRAVENTRICULAR TACHYCARDIA (CMS/HCC): ICD-10-CM

## 2021-02-03 PROCEDURE — 99213 OFFICE O/P EST LOW 20 MIN: CPT | Mod: 95 | Performed by: INTERNAL MEDICINE

## 2021-02-03 RX ORDER — BUDESONIDE 0.5 MG/2ML
0.5 INHALANT ORAL
Qty: 60 ML | Refills: 11 | Status: SHIPPED | OUTPATIENT
Start: 2021-02-03 | End: 2021-04-21

## 2021-02-03 RX ORDER — PREDNISONE 20 MG/1
TABLET ORAL
Qty: 30 TABLET | Refills: 0 | Status: SHIPPED | OUTPATIENT
Start: 2021-02-03 | End: 2021-02-03 | Stop reason: ENTERED-IN-ERROR

## 2021-02-03 RX ORDER — BUDESONIDE 0.5 MG/2ML
0.5 INHALANT ORAL
Qty: 60 ML | Refills: 11 | Status: SHIPPED | OUTPATIENT
Start: 2021-02-03 | End: 2021-02-03 | Stop reason: SDUPTHER

## 2021-02-03 ASSESSMENT — ENCOUNTER SYMPTOMS
COUGH: 1
EYE REDNESS: 0
DECREASED CONCENTRATION: 0
SHORTNESS OF BREATH: 1
SORE THROAT: 0
AGITATION: 0
CONSTIPATION: 0
CARDIOVASCULAR NEGATIVE: 1
NEUROLOGICAL NEGATIVE: 1
CONSTITUTIONAL NEGATIVE: 1
WOUND: 0
MUSCULOSKELETAL NEGATIVE: 1
CHEST TIGHTNESS: 1
SLEEP DISTURBANCE: 0
DYSPHORIC MOOD: 0
DIARRHEA: 0
TROUBLE SWALLOWING: 0
VOMITING: 0
NAUSEA: 0
DYSURIA: 0
FLANK PAIN: 0
HEMATOLOGIC/LYMPHATIC NEGATIVE: 1
ABDOMINAL PAIN: 0
FREQUENCY: 0

## 2021-02-03 NOTE — TELEPHONE ENCOUNTER
Patient has asthma that flairs up and she said has moments where she coughs a lot. She said the medication she is taking now proair is not helping. She wants to know if Dr Guy can call her in a steroid possibly.    Phone # [phone]    [facility] pharmacy

## 2021-02-03 NOTE — PROGRESS NOTES
Verification of Patient Location:  The patient affirms they are currently located in the following state: [place]    Request for Consent:    Video Encounter   Odilia, my name is Melonie Guy MD.  Before we proceed, can you please verify your identification by telling me your full name and date of birth?  Can you tell me who is in the room with you?    You and I are about to have a telemedicine check-in or visit because you have requested it.  This is a live video-conference.  I am a real person, speaking to you in real time.  There is no one else with me on the video-conference.  However, when we use ([employer], [employer], etc) it is important for you to know that the video-conference may not be secure or private.  I am not recording this conversation and I am asking you not to record it.  This telemedicine visit will be billed to your health insurance or you, if you are self-insured.  You understand you will be responsible for any copayments or coinsurances that apply to your telemedicine visit.  Communication platform used for this encounter:  D[employer]     Before starting our telemedicine visit, I am required to get your consent for this virtual check-in or visit by telemedicine. Do you consent?      Patient Response to Request for Consent:  Yes      Visit Documentation:  Subjective     Patient ID: Kelly Meeks is a 70 y.o. female.  1950      HPI  This was a video telehealth visit conducted due to the COVID pandemic  The following have been reviewed and updated as appropriate in this visit:      She is having a flare of her asthma- using ProAir which is not helping sufficiently   Using her incentive spirometer - numbers are reaching about 300 mmHg, compared to her baseline of approx 450   Has a tight cough, mild chest discomfort and dyspnea  Has home Pox and running 94-96%        Review of Systems   Constitutional: Negative.    HENT: Negative for ear pain, nosebleeds, sore throat, tinnitus and trouble  swallowing.    Eyes: Negative for redness and visual disturbance.   Respiratory: Positive for cough, chest tightness and shortness of breath.    Cardiovascular: Negative.    Gastrointestinal: Negative for abdominal pain, constipation, diarrhea, nausea and vomiting.   Endocrine: Negative for cold intolerance and heat intolerance.   Genitourinary: Negative for dysuria, flank pain, frequency and urgency.   Musculoskeletal: Negative.    Skin: Negative for rash and wound.   Allergic/Immunologic: Negative for environmental allergies and food allergies.   Neurological: Negative.    Hematological: Negative.    Psychiatric/Behavioral: Negative for agitation, decreased concentration, dysphoric mood and sleep disturbance.         Assessment/Plan   Asthma - will send in rx for Pulmicort by nebulizer   Continue Pro Air prn   Add Mucinex  If nebulized steroid is not covered will have her take oral steroid- she is reluctant to do this in the event that she can get a possible vaccine appointment in near future    Discussed treatment options in detail since she is aware that oral steroids will likely work best but she does not want to jeopardize her vaccine opportunity if an appt becomes available     SVT- also discussed her hx of DVT and which medications may exacerbate this problem  Reminded her that the Albuterol is mot likely suspect to do this but also leaving symptoms untreated would be problematic     Time Spent:  I spent 20 minutes on this date of service performing the following activities: providing counseling and education.

## 2021-03-10 RX ORDER — CLONAZEPAM 1 MG/1
TABLET ORAL
Qty: 90 TABLET | Refills: 3 | Status: SHIPPED | OUTPATIENT
Start: 2021-03-10 | End: 2022-02-22

## 2021-03-10 NOTE — TELEPHONE ENCOUNTER
Medicine Refill Request    Last Office Visit: 12/21/2020  Last Telemedicine Visit: 2/3/2021 Melonie Guy MD    Next Office Visit: Visit date not found  Next Telemedicine Visit: Visit date not found         Current Outpatient Medications:   •  albuterol 2.5 mg /3 mL (0.083 %) nebulizer solution, inhale 3 milliliter by nebulization route 3 times every day, Disp: , Rfl:   •  albuterol HFA (PROAIR HFA) 90 mcg/actuation inhaler, Inhale 2 puffs 4 (four) times a day as needed., Disp: 3 Inhaler, Rfl: 3  •  budesonide (PULMICORT) 0.5 mg/2 mL nebulizer solution, Take 2 mL (0.5 mg total) by nebulization 2 (two) times a day., Disp: 60 mL, Rfl: 11  •  cholecalciferol, vitamin D3, (VITAMIN D3) 2,000 unit tablet, 2 (two) times a day.  , Disp: , Rfl:   •  clonazePAM (klonoPIN) 1 mg tablet, TAKE 1 TABLET (1 MG TOTAL) BY MOUTH DAILY., Disp: 90 tablet, Rfl: 1  •  conjugated estrogens (PREMARIN) 0.625 mg/gram vaginal cream, Insert into the vagina daily., Disp: 30 g, Rfl: 3  •  DOCOSAHEXANOIC ACID/EPA (FISH OIL ORAL), take  atotal 3,000  daily, Disp: , Rfl:   •  estradioL (ESTRACE) 0.01 % (0.1 mg/gram) vaginal cream, Insert 2 g into the vagina nightly., Disp: 42.5 g, Rfl: 3  •  ezetimibe (ZETIA) 10 mg tablet, TAKE ONE TABLET BY MOUTH 3 TIMES A WEEK (MON, WED, FRI), Disp: 39 tablet, Rfl: 3  •  famotidine (PEPCID) 20 mg tablet, TAKE 1 TABLET (20 MG TOTAL) BY MOUTH ONCE DAILY., Disp: 90 tablet, Rfl: 1  •  Lactobac no.41/Bifidobact no.7 (PROBIOTIC-10 ORAL), Take by mouth daily., Disp: , Rfl:   •  levothyroxine (SYNTHROID) 100 mcg tablet, TAKE ONE TABLET BY MOUTH EVERY DAY, Disp: 90 tablet, Rfl: 3  •  melatonin 5 mg tablet, Take by mouth nightly.  , Disp: , Rfl:   •  montelukast (SINGULAIR) 10 mg tablet, TAKE 1 TABLET (10 MG TOTAL) BY MOUTH NIGHTLY., Disp: 90 tablet, Rfl: 1  •  multivitamin tablet, Take by mouth daily., Disp: , Rfl:   •  niacin (ENDUR-ACIN) 500 mg CR tablet, Take 2 tablets (1,000 mg total) by mouth nightly., Disp: 180  tablet, Rfl: 3  •  rosuvastatin (CRESTOR) 5 mg tablet, Take 1 tablet (5 mg total) by mouth 3 (three) times a week (Mon, Wed, Fri)., Disp: 36 tablet, Rfl: 3  •  turmeric-turmeric root extract 450-50 mg capsule, Take by mouth 2 (two) times a day., Disp: , Rfl:   •  verapamiL (CALAN) 40 mg tablet, TAKE ONE TABLET BY MOUTH ONCE DAILY WHEN NEEDED, Disp: 90 tablet, Rfl: 1  •  verapamil (VERELAN) 240 mg 24 hr capsule, Take 1 capsule (240 mg total) by mouth nightly., Disp: 90 capsule, Rfl: 3      BP Readings from Last 3 Encounters:   12/21/20 112/60   01/24/20 116/70   12/04/19 100/62       Recent Lab results:  Lab Results   Component Value Date    CHOL 227 (H) 10/17/2019   ,   Lab Results   Component Value Date    HDL 75 10/17/2019   ,   Lab Results   Component Value Date    LDLCALC 138 (H) 10/17/2019   ,   Lab Results   Component Value Date    TRIG 70 10/17/2019        Lab Results   Component Value Date    GLUCOSE 85 10/17/2019   , No results found for: HGBA1C      Lab Results   Component Value Date    CREATININE 0.8 10/17/2019       Lab Results   Component Value Date    TSH 3.280 10/28/2019

## 2021-04-13 DIAGNOSIS — Z23 ENCOUNTER FOR IMMUNIZATION: ICD-10-CM

## 2021-04-15 ENCOUNTER — TELEMEDICINE (OUTPATIENT)
Dept: FAMILY MEDICINE | Facility: CLINIC | Age: 71
End: 2021-04-15
Payer: MEDICARE

## 2021-04-15 ENCOUNTER — OFFICE VISIT (OUTPATIENT)
Dept: CARDIOLOGY | Facility: CLINIC | Age: 71
End: 2021-04-15
Payer: MEDICARE

## 2021-04-15 VITALS
HEIGHT: 62 IN | RESPIRATION RATE: 16 BRPM | HEART RATE: 67 BPM | DIASTOLIC BLOOD PRESSURE: 64 MMHG | SYSTOLIC BLOOD PRESSURE: 102 MMHG | BODY MASS INDEX: 25.84 KG/M2

## 2021-04-15 DIAGNOSIS — E78.5 DYSLIPIDEMIA: ICD-10-CM

## 2021-04-15 DIAGNOSIS — S03.00XA DISLOCATION OF TEMPOROMANDIBULAR JOINT, INITIAL ENCOUNTER: Primary | ICD-10-CM

## 2021-04-15 DIAGNOSIS — M26.609 TMJ DYSFUNCTION: ICD-10-CM

## 2021-04-15 DIAGNOSIS — I47.10 SUPRAVENTRICULAR TACHYCARDIA (CMS/HCC): Primary | ICD-10-CM

## 2021-04-15 PROCEDURE — 99204 OFFICE O/P NEW MOD 45 MIN: CPT | Performed by: INTERNAL MEDICINE

## 2021-04-15 PROCEDURE — 93000 ELECTROCARDIOGRAM COMPLETE: CPT | Performed by: INTERNAL MEDICINE

## 2021-04-15 PROCEDURE — 99213 OFFICE O/P EST LOW 20 MIN: CPT | Mod: 95 | Performed by: INTERNAL MEDICINE

## 2021-04-15 RX ORDER — ROSUVASTATIN CALCIUM 5 MG/1
5 TABLET, COATED ORAL 3 TIMES WEEKLY
Qty: 39 TABLET | Refills: 3 | Status: SHIPPED | OUTPATIENT
Start: 2021-04-16 | End: 2021-10-05 | Stop reason: SDUPTHER

## 2021-04-15 RX ORDER — FAMOTIDINE 20 MG/1
TABLET, FILM COATED ORAL
Qty: 90 TABLET | Refills: 3 | Status: SHIPPED | OUTPATIENT
Start: 2021-04-15 | End: 2021-10-06 | Stop reason: SDUPTHER

## 2021-04-15 ASSESSMENT — ENCOUNTER SYMPTOMS
TROUBLE SWALLOWING: 0
DECREASED CONCENTRATION: 0
ARTHRALGIAS: 1
DYSURIA: 0
RESPIRATORY NEGATIVE: 1
JOINT SWELLING: 0
NECK STIFFNESS: 0
ABDOMINAL PAIN: 0
NEUROLOGICAL NEGATIVE: 1
BACK PAIN: 1
AGITATION: 0
WOUND: 0
VOMITING: 0
CONSTIPATION: 0
FREQUENCY: 0
NECK PAIN: 0
SORE THROAT: 0
HEMATOLOGIC/LYMPHATIC NEGATIVE: 1
NAUSEA: 0
DYSPHORIC MOOD: 0
CONSTITUTIONAL NEGATIVE: 1
EYE REDNESS: 0
CARDIOVASCULAR NEGATIVE: 1
SLEEP DISTURBANCE: 0
FLANK PAIN: 0
DIARRHEA: 0

## 2021-04-15 NOTE — PROGRESS NOTES
Verification of Patient Location:  The patient affirms they are currently located in the following state: [place]    Request for Consent:    Video Encounter   Odilia, my name is Melonie Guy MD.  Before we proceed, can you please verify your identification by telling me your full name and date of birth?  Can you tell me who is in the room with you?    You and I are about to have a telemedicine check-in or visit because you have requested it.  This is a live video-conference.  I am a real person, speaking to you in real time.  There is no one else with me on the video-conference.  However, when we use ([employer], [employer], etc) it is important for you to know that the video-conference may not be secure or private.  I am not recording this conversation and I am asking you not to record it.  This telemedicine visit will be billed to your health insurance or you, if you are self-insured.  You understand you will be responsible for any copayments or coinsurances that apply to your telemedicine visit.  Communication platform used for this encounter:  [employer]     Before starting our telemedicine visit, I am required to get your consent for this virtual check-in or visit by telemedicine. Do you consent?      Patient Response to Request for Consent:  Yes      Visit Documentation:  Subjective     Patient ID: Kelly Meeks is a 70 y.o. female.  1950      HPI  This was a video telehealth visit conducted due to the COVID pandemic  The following have been reviewed and updated as appropriate in this visit:  She has several skin lesions that are concerning her - has appt with Dr Manuel' office   Also has TMJ - has retainer that recently started wearing    She has several lesions on her face that are new or recurring - has a discolored mole on her forehead that is growing     She also has right sided jaw pain and TMJ- wearing a retainer and it is helping to some degree    She is getting PT which is really helping her back  pain           Review of Systems   Constitutional: Negative.    HENT: Negative for ear pain, nosebleeds, sore throat, tinnitus and trouble swallowing.         Jaw pain    Eyes: Negative for redness and visual disturbance.   Respiratory: Negative.    Cardiovascular: Negative.    Gastrointestinal: Negative for abdominal pain, constipation, diarrhea, nausea and vomiting.   Endocrine: Negative for cold intolerance and heat intolerance.   Genitourinary: Negative for dysuria, flank pain, frequency and urgency.   Musculoskeletal: Positive for arthralgias and back pain. Negative for joint swelling, neck pain and neck stiffness.   Skin: Negative for rash and wound.   Allergic/Immunologic: Negative for environmental allergies and food allergies.   Neurological: Negative.    Hematological: Negative.    Psychiatric/Behavioral: Negative for agitation, decreased concentration, dysphoric mood and sleep disturbance.         Assessment/Plan   Diagnoses and all orders for this visit:    Dislocation of temporomandibular joint, initial encounter (Primary)    TMJ dysfunction  -     Ambulatory referral to Physical Therapy; Future    Other orders  -     famotidine (PEPCID) 20 mg tablet; TAKE 1 TABLET (20 MG TOTAL) BY MOUTH ONCE DAILY.    Will order PT for her TMJ issue  Continue PT for back pain  Recommended that she consider getting either myofascial release or massage to help with the discomfort     Will see Dr Manuel' associate for the skin lesion  Renewed rx for her famotidine     Follow up prn     Time Spent:  I spent 24 minutes on this date of service performing the following activities: providing counseling and education.

## 2021-04-15 NOTE — LETTER
April 15, 2021     Melonie Guy MD  [address]  [facility] [address]    Patient: Kelly Meeks  YOB: 1950  Date of Visit: 4/15/2021      Dear Dr. Guy:    Thank you for referring Kelly Meeks to me for evaluation. Below are my notes for this consultation.    If you have questions, please do not hesitate to call me. I look forward to following your patient along with you.         Sincerely,        Ofelia Muse MD        CC: MD Micha Cross Maribel, MD  4/19/2021  9:12 AM  Signed   Ofelia Muse MD, [facility]  Cardiac Electrophysiology    [facility] HEART [facility]  [facility]  [facility] Level  [address] [place] [address]    TEL  [phone]  [facility]       Electrophysiology Initial Evaluation  April 15, 2021    Dear Dr. Guy:     Thank you for asking me to see your patient for electrophysiology consultation.    Kelly Meeks is a 70 y.o. female who presents to the office today with a history of supraventricular tachycardia. I last saw the patient in 2015. The patient also has a past medical history of dyslipidemia. She is here today to reestablish care.    The patient had an ultrasound of the carotid bilateral on 12/31/2019, which revealed mild plaque. In addition, completed a CT coronary calcium test on 12/20/2019, which revealed a score of 0.    Today, she is feeling generally well. She reports that she is feeling very well from a cardiovascular standpoint and has not had recurrence of SVT arrhythmia or palpitations in several years. She is currently taking verapamil 240 mg daily. She continues to work in real estate. She recently started a bone broth diet for weight loss. No palpitations, dizziness, near syncope or syncope. No chest pain. No dyspnea on exertion, PND, orthopnea or pedal edema. She is scheduled to complete a stress echocardiogram on 4/21/2021 ordered by Dr. Saucedo.      Patient Active Problem List   Diagnosis   • Asthma   • Supraventricular tachycardia (CMS/HCC)   • Hypothyroidism   • Murmur   • Esophageal reflux   • Elevated Lp(a)   • Arteriosclerosis of both carotid arteries   • Raynaud's disease without gangrene   • Insulin resistance   • Homocystinuria (CMS/HCC)   • Dyslipidemia     Past Medical History:   Diagnosis Date   • Asthma    • Coronary artery disease    • Hyperlipidemia      Past Surgical History:   Procedure Laterality Date   • ADENOIDECTOMY     • CARDIAC CATHETERIZATION  1976   • TONSILLECTOMY       Allergies: Epinephrine, Procaine, and Caffeine    Current Outpatient Medications   Medication Sig Dispense Refill   • albuterol 2.5 mg /3 mL (0.083 %) nebulizer solution inhale 3 milliliter by nebulization route 3 times every day     • albuterol HFA (PROAIR HFA) 90 mcg/actuation inhaler Inhale 2 puffs 4 (four) times a day as needed. 3 Inhaler 3   • cholecalciferol, vitamin D3, (VITAMIN D3) 2,000 unit tablet 2 (two) times a day.       • clonazePAM (klonoPIN) 1 mg tablet TAKE ONE (1) TABLET (1 MG TOTAL) BY MOUTH DAILY.  90 tablet 3   • ezetimibe (ZETIA) 10 mg tablet TAKE ONE TABLET BY MOUTH 3 TIMES A WEEK (MON, WED, FRI) 39 tablet 3   • famotidine (PEPCID) 20 mg tablet TAKE 1 TABLET (20 MG TOTAL) BY MOUTH ONCE DAILY. 90 tablet 3   • levothyroxine (SYNTHROID) 100 mcg tablet TAKE ONE TABLET BY MOUTH EVERY DAY 90 tablet 3   • melatonin 5 mg tablet Take by mouth nightly.       • montelukast (SINGULAIR) 10 mg tablet TAKE 1 TABLET (10 MG TOTAL) BY MOUTH NIGHTLY. 90 tablet 1   • multivitamin tablet Take by mouth daily.     • niacin (ENDUR-ACIN) 500 mg CR tablet Take 2 tablets (1,000 mg total) by mouth nightly. 180 tablet 3   • rosuvastatin (CRESTOR) 5 mg tablet Take 1 tablet (5 mg total) by mouth 3 (three) times a week (Mon, Wed, Fri). 39 tablet 3   • verapamiL (CALAN) 40 mg tablet TAKE ONE TABLET BY MOUTH ONCE DAILY WHEN NEEDED 90 tablet 1   • verapamil (VERELAN) 240 mg 24  hr capsule Take 1 capsule (240 mg total) by mouth nightly. 90 capsule 3   • budesonide (PULMICORT) 0.5 mg/2 mL nebulizer solution Take 2 mL (0.5 mg total) by nebulization 2 (two) times a day. (Patient not taking: Reported on 4/15/2021 ) 60 mL 11   • conjugated estrogens (PREMARIN) 0.625 mg/gram vaginal cream Insert into the vagina daily. 30 g 3   • DOCOSAHEXANOIC ACID/EPA (FISH OIL ORAL) take  atotal 3,000  daily     • estradioL (ESTRACE) 0.01 % (0.1 mg/gram) vaginal cream Insert 2 g into the vagina nightly. 42.5 g 3   • Lactobac no.41/Bifidobact no.7 (PROBIOTIC-10 ORAL) Take by mouth daily.     • turmeric-turmeric root extract 450-50 mg capsule Take by mouth 2 (two) times a day.       No current facility-administered medications for this visit.     Social History     Tobacco Use   • Smoking status: Former Smoker   • Smokeless tobacco: Never Used   • Tobacco comment: stopped @ age 31   Substance Use Topics   • Alcohol use: Yes     Comment: daily   • Drug use: No      Family History   Problem Relation Age of Onset   • Heart attack Biological Mother    • Stroke Biological Mother    • Heart attack Biological Father       Review of Systems:      As in HPI.  All other other systems were reviewed and are negative.    Constitution: Negative for fever and malaise/fatigue.   HENT: Negative for hearing loss.    Eyes: Negative for visual disturbance.   Cardiovascular: Negative for chest pain, dyspnea on exertion, near-syncope, palpitations and syncope.   Respiratory: Negative for cough and wheezing.    Hematologic/Lymphatic: Does not bruise/bleed easily. No bleeding.  Skin: Negative for rash.   Musculoskeletal: Negative for joint pain.   Gastrointestinal: Negative for abdominal pain and change in bowel habit.   Neurological: Negative for headaches and light-headedness.     Objective     Vitals:    04/15/21 1600   BP: 102/64   BP Location: Left upper arm   Patient Position: Sitting   Pulse: 67   Resp: 16   Height: 1.562 m (5'  "1.5\")     Physical Exam : Pleasant, in no acute distress.    Head:                    Atraumatic. Normocephalic  Eyes:                     No scleral icterus. Normal EOMs  Neck:                     No thyromegaly present. Normal ROM  Vascular:              No JVD. No carotid bruits  Cardiovascular:   Normal S1/S2, Regular rhythm, no murmur heard.  Pulmonary:           Breath sounds normal. No  Rales, wheezes or rhonchi bilateral.  Abdominal:           Soft. There is no tenderness. Bowel sounds present. No masses.  Musculoskeletal: There are no deformities.   Extremities:          No edema or cyanosis.  Neurological:        Alert, oriented x3. No focal deficits to gross exam.  Skin:                      No rash noted.   Psychiatric:           Normal mood and affect     LABS:  Lab Results   Component Value Date    WBC 4.7 10/28/2019    HGB 14.0 10/28/2019    HCT 41.8 10/28/2019     10/28/2019    CHOL 227 (H) 10/17/2019    TRIG 70 10/17/2019    HDL 75 10/17/2019    ALT 35 10/17/2019    AST 31 10/17/2019     10/17/2019    K 4.2 10/17/2019     10/17/2019    CREATININE 0.8 10/17/2019    BUN 14 10/17/2019    CO2 28 10/17/2019    TSH 3.280 10/28/2019    LDLCALC 138 (H) 10/17/2019     ECG 4/15/2021:  I personally reviewed the 12 lead EKG obtained in the office today which reveals Sinus rhythm. Normal EKG.    Assessment/Plan     Supraventricular tachycardia (CMS/HCC) (HCC)  No recurrence of supraventricular tachycardia in the past 6 years on long-term AV paramjit blocking agent therapy with verapamil sustained release 240 mg once a day.  She has recurrence of supraventricular tachycardia she is an excellent candidate to undergo catheter mediated ablation procedure.  I advised her to continue her current dose.  She also has a short acting verapamil if needed if she has recurrence of the arrhythmia.    Dyslipidemia  The patient will continue rosuvastatin and follow-up with Dr. Saucedo.  Her CT heart calcium " score is 0.   She has gained weight recently and she is following a diet to lose weight.  She is aware that she needs to follow a heart healthy Mediterranean diet and to exercise regularly.  She also has a family history of premature atherosclerotic coronary artery disease.  She reports that she has stress echocardiogram scheduled for next week which was ordered by Dr. Saucedo.  He will order the follow-up fasting lipid profile.     I ordered routine blood work including CBC, CMP, Magnesium, and TSH.     Return in about 1 year (around 4/15/2022).    Thank you for allowing me to participate in the care of your patient.  Please do not hesitate to contact me if you have any questions regarding my recommendations and management.    Sincerely;    Ofelia Guerra MD [facility]    This document was generated utilizing voice recognition technology. A reasonable attempt at proofreading has been made to minimize errors but please excuse any typographical errors which may be present. Please call with any questions.    By signing my name below, I, Melonie Jones, attest that this documentation has been prepared under the direction and in the presence of Ofelia Muse MD Electronically signed: Khadar Blevins. 4/15/2021 3:49 PM     I, Ofelia Muse MD, personally performed the services described in this documentation. All medical record entries made by the scribe were at my direction and in my presence. I have reviewed the chart and agree that the record reflects my personal performance and is accurate and complete. Ofelia Muse MD. 4/15/2021. 3:49 PM.

## 2021-04-15 NOTE — TELEPHONE ENCOUNTER
Medicine Refill Request    Last Office Visit: Visit date not found  Last Telemedicine Visit: Visit date not found    rosuvastatin (CRESTOR) 5 mg tablet         Current Outpatient Medications:   •  albuterol 2.5 mg /3 mL (0.083 %) nebulizer solution, inhale 3 milliliter by nebulization route 3 times every day, Disp: , Rfl:   •  albuterol HFA (PROAIR HFA) 90 mcg/actuation inhaler, Inhale 2 puffs 4 (four) times a day as needed., Disp: 3 Inhaler, Rfl: 3  •  budesonide (PULMICORT) 0.5 mg/2 mL nebulizer solution, Take 2 mL (0.5 mg total) by nebulization 2 (two) times a day., Disp: 60 mL, Rfl: 11  •  cholecalciferol, vitamin D3, (VITAMIN D3) 2,000 unit tablet, 2 (two) times a day.  , Disp: , Rfl:   •  clonazePAM (klonoPIN) 1 mg tablet, TAKE ONE (1) TABLET (1 MG TOTAL) BY MOUTH DAILY. , Disp: 90 tablet, Rfl: 3  •  conjugated estrogens (PREMARIN) 0.625 mg/gram vaginal cream, Insert into the vagina daily., Disp: 30 g, Rfl: 3  •  DOCOSAHEXANOIC ACID/EPA (FISH OIL ORAL), take  atotal 3,000  daily, Disp: , Rfl:   •  estradioL (ESTRACE) 0.01 % (0.1 mg/gram) vaginal cream, Insert 2 g into the vagina nightly., Disp: 42.5 g, Rfl: 3  •  ezetimibe (ZETIA) 10 mg tablet, TAKE ONE TABLET BY MOUTH 3 TIMES A WEEK (MON, WED, FRI), Disp: 39 tablet, Rfl: 3  •  famotidine (PEPCID) 20 mg tablet, TAKE 1 TABLET (20 MG TOTAL) BY MOUTH ONCE DAILY., Disp: 90 tablet, Rfl: 3  •  Lactobac no.41/Bifidobact no.7 (PROBIOTIC-10 ORAL), Take by mouth daily., Disp: , Rfl:   •  levothyroxine (SYNTHROID) 100 mcg tablet, TAKE ONE TABLET BY MOUTH EVERY DAY, Disp: 90 tablet, Rfl: 3  •  melatonin 5 mg tablet, Take by mouth nightly.  , Disp: , Rfl:   •  montelukast (SINGULAIR) 10 mg tablet, TAKE 1 TABLET (10 MG TOTAL) BY MOUTH NIGHTLY., Disp: 90 tablet, Rfl: 1  •  multivitamin tablet, Take by mouth daily., Disp: , Rfl:   •  niacin (ENDUR-ACIN) 500 mg CR tablet, Take 2 tablets (1,000 mg total) by mouth nightly., Disp: 180 tablet, Rfl: 3  •  rosuvastatin (CRESTOR) 5  mg tablet, Take 1 tablet (5 mg total) by mouth 3 (three) times a week (Mon, Wed, Fri)., Disp: 36 tablet, Rfl: 3  •  turmeric-turmeric root extract 450-50 mg capsule, Take by mouth 2 (two) times a day., Disp: , Rfl:   •  verapamiL (CALAN) 40 mg tablet, TAKE ONE TABLET BY MOUTH ONCE DAILY WHEN NEEDED, Disp: 90 tablet, Rfl: 1  •  verapamil (VERELAN) 240 mg 24 hr capsule, Take 1 capsule (240 mg total) by mouth nightly., Disp: 90 capsule, Rfl: 3      BP Readings from Last 3 Encounters:   12/21/20 112/60   01/24/20 116/70   12/04/19 100/62       Recent Lab results:  Lab Results   Component Value Date    CHOL 227 (H) 10/17/2019   ,   Lab Results   Component Value Date    HDL 75 10/17/2019   ,   Lab Results   Component Value Date    LDLCALC 138 (H) 10/17/2019   ,   Lab Results   Component Value Date    TRIG 70 10/17/2019        Lab Results   Component Value Date    GLUCOSE 85 10/17/2019   , No results found for: HGBA1C      Lab Results   Component Value Date    CREATININE 0.8 10/17/2019       Lab Results   Component Value Date    TSH 3.280 10/28/2019

## 2021-04-15 NOTE — PROGRESS NOTES
Ofelia Muse MD, [facility]  Cardiac Electrophysiology    [facility]    [facility]  The [facility] [address]  [address] [place] [address]    TEL  [phone]  [facility]       Electrophysiology Initial Evaluation  April 15, 2021    Dear Dr. Guy:     Thank you for asking me to see your patient for electrophysiology consultation.    Kelly Meeks is a 70 y.o. female who presents to the office today with a history of supraventricular tachycardia. I last saw the patient in 2015. The patient also has a past medical history of dyslipidemia. She is here today to reestablish care.    The patient had an ultrasound of the carotid bilateral on 12/31/2019, which revealed mild plaque. In addition, completed a CT coronary calcium test on 12/20/2019, which revealed a score of 0.    Today, she is feeling generally well. She reports that she is feeling very well from a cardiovascular standpoint and has not had recurrence of SVT arrhythmia or palpitations in several years. She is currently taking verapamil 240 mg daily. She continues to work in real estate. She recently started a bone [employer] diet for weight loss. No palpitations, dizziness, near syncope or syncope. No chest pain. No dyspnea on exertion, PND, orthopnea or pedal edema. She is scheduled to complete a stress echocardiogram on 4/21/2021 ordered by Dr. Saucedo.     Patient Active Problem List   Diagnosis   • Asthma   • Supraventricular tachycardia (CMS/HCC)   • Hypothyroidism   • Murmur   • Esophageal reflux   • Elevated Lp(a)   • Arteriosclerosis of both carotid arteries   • Raynaud's disease without gangrene   • Insulin resistance   • Homocystinuria (CMS/HCC)   • Dyslipidemia     Past Medical History:   Diagnosis Date   • Asthma    • Coronary artery disease    • Hyperlipidemia      Past Surgical History:   Procedure Laterality Date   • ADENOIDECTOMY     • CARDIAC CATHETERIZATION  1976   •  TONSILLECTOMY       Allergies: Epinephrine, Procaine, and Caffeine    Current Outpatient Medications   Medication Sig Dispense Refill   • albuterol 2.5 mg /3 mL (0.083 %) nebulizer solution inhale 3 milliliter by nebulization route 3 times every day     • albuterol HFA (PROAIR HFA) 90 mcg/actuation inhaler Inhale 2 puffs 4 (four) times a day as needed. 3 Inhaler 3   • cholecalciferol, vitamin D3, (VITAMIN D3) 2,000 unit tablet 2 (two) times a day.       • clonazePAM (klonoPIN) 1 mg tablet TAKE ONE (1) TABLET (1 MG TOTAL) BY MOUTH DAILY.  90 tablet 3   • ezetimibe (ZETIA) 10 mg tablet TAKE ONE TABLET BY MOUTH 3 TIMES A WEEK (MON, WED, FRI) 39 tablet 3   • famotidine (PEPCID) 20 mg tablet TAKE 1 TABLET (20 MG TOTAL) BY MOUTH ONCE DAILY. 90 tablet 3   • levothyroxine (SYNTHROID) 100 mcg tablet TAKE ONE TABLET BY MOUTH EVERY DAY 90 tablet 3   • melatonin 5 mg tablet Take by mouth nightly.       • montelukast (SINGULAIR) 10 mg tablet TAKE 1 TABLET (10 MG TOTAL) BY MOUTH NIGHTLY. 90 tablet 1   • multivitamin tablet Take by mouth daily.     • niacin (ENDUR-ACIN) 500 mg CR tablet Take 2 tablets (1,000 mg total) by mouth nightly. 180 tablet 3   • rosuvastatin (CRESTOR) 5 mg tablet Take 1 tablet (5 mg total) by mouth 3 (three) times a week (Mon, Wed, Fri). 39 tablet 3   • verapamiL (CALAN) 40 mg tablet TAKE ONE TABLET BY MOUTH ONCE DAILY WHEN NEEDED 90 tablet 1   • verapamil (VERELAN) 240 mg 24 hr capsule Take 1 capsule (240 mg total) by mouth nightly. 90 capsule 3   • budesonide (PULMICORT) 0.5 mg/2 mL nebulizer solution Take 2 mL (0.5 mg total) by nebulization 2 (two) times a day. (Patient not taking: Reported on 4/15/2021 ) 60 mL 11   • conjugated estrogens (PREMARIN) 0.625 mg/gram vaginal cream Insert into the vagina daily. 30 g 3   • DOCOSAHEXANOIC ACID/EPA (FISH OIL ORAL) take  atotal 3,000  daily     • estradioL (ESTRACE) 0.01 % (0.1 mg/gram) vaginal cream Insert 2 g into the vagina nightly. 42.5 g 3   • Lactobac  "no.41/Bifidobact no.7 (PROBIOTIC-10 ORAL) Take by mouth daily.     • turmeric-turmeric root extract 450-50 mg capsule Take by mouth 2 (two) times a day.       No current facility-administered medications for this visit.     Social History     Tobacco Use   • Smoking status: Former Smoker   • Smokeless tobacco: Never Used   • Tobacco comment: stopped @ age 31   Substance Use Topics   • Alcohol use: Yes     Comment: daily   • Drug use: No      Family History   Problem Relation Age of Onset   • Heart attack Biological Mother    • Stroke Biological Mother    • Heart attack Biological Father       Review of Systems:      As in HPI.  All other other systems were reviewed and are negative.    Constitution: Negative for fever and malaise/fatigue.   HENT: Negative for hearing loss.    Eyes: Negative for visual disturbance.   Cardiovascular: Negative for chest pain, dyspnea on exertion, near-syncope, palpitations and syncope.   Respiratory: Negative for cough and wheezing.    Hematologic/Lymphatic: Does not bruise/bleed easily. No bleeding.  Skin: Negative for rash.   Musculoskeletal: Negative for joint pain.   Gastrointestinal: Negative for abdominal pain and change in bowel habit.   Neurological: Negative for headaches and light-headedness.     Objective     Vitals:    04/15/21 1600   BP: 102/64   BP Location: Left upper arm   Patient Position: Sitting   Pulse: 67   Resp: 16   Height: 1.562 m (5' 1.5\")     Physical Exam : Pleasant, in no acute distress.    Head:                    Atraumatic. Normocephalic  Eyes:                     No scleral icterus. Normal EOMs  Neck:                     No thyromegaly present. Normal ROM  Vascular:              No JVD. No carotid bruits  Cardiovascular:   Normal S1/S2, Regular rhythm, no murmur heard.  Pulmonary:           Breath sounds normal. No  Rales, wheezes or rhonchi bilateral.  Abdominal:           Soft. There is no tenderness. Bowel sounds present. No masses.  Musculoskeletal: " There are no deformities.   Extremities:          No edema or cyanosis.  Neurological:        Alert, oriented x3. No focal deficits to gross exam.  Skin:                      No rash noted.   Psychiatric:           Normal mood and affect     LABS:  Lab Results   Component Value Date    WBC 4.7 10/28/2019    HGB 14.0 10/28/2019    HCT 41.8 10/28/2019     10/28/2019    CHOL 227 (H) 10/17/2019    TRIG 70 10/17/2019    HDL 75 10/17/2019    ALT 35 10/17/2019    AST 31 10/17/2019     10/17/2019    K 4.2 10/17/2019     10/17/2019    CREATININE 0.8 10/17/2019    BUN 14 10/17/2019    CO2 28 10/17/2019    TSH 3.280 10/28/2019    LDLCALC 138 (H) 10/17/2019     ECG 4/15/2021:  I personally reviewed the 12 lead EKG obtained in the office today which reveals Sinus rhythm. Normal EKG.    Assessment/Plan     Supraventricular tachycardia (CMS/HCC) (HCC)  No recurrence of supraventricular tachycardia in the past 6 years on long-term AV paramjit blocking agent therapy with verapamil sustained release 240 mg once a day.  She has recurrence of supraventricular tachycardia she is an excellent candidate to undergo catheter mediated ablation procedure.  I advised her to continue her current dose.  She also has a short acting verapamil if needed if she has recurrence of the arrhythmia.    Dyslipidemia  The patient will continue rosuvastatin and follow-up with Dr. Saucedo.  Her CT heart calcium score is 0.   She has gained weight recently and she is following a diet to lose weight.  She is aware that she needs to follow a heart healthy Mediterranean diet and to exercise regularly.  She also has a family history of premature atherosclerotic coronary artery disease.  She reports that she has stress echocardiogram scheduled for next week which was ordered by Dr. Saucedo.  He will order the follow-up fasting lipid profile.     I ordered routine blood work including CBC, CMP, Magnesium, and TSH.     Return in about 1 year  (around 4/15/2022).    Thank you for allowing me to participate in the care of your patient.  Please do not hesitate to contact me if you have any questions regarding my recommendations and management.    Sincerely;    Ofelia Guerra MD [facility]    This document was generated utilizing voice recognition technology. A reasonable attempt at proofreading has been made to minimize errors but please excuse any typographical errors which may be present. Please call with any questions.    By signing my name below, I, Melonie Jones, attest that this documentation has been prepared under the direction and in the presence of Ofelia Muse MD Electronically signed: Khadar Blevins. 4/15/2021 3:49 PM     I, Ofelia Muse MD, personally performed the services described in this documentation. All medical record entries made by the scribe were at my direction and in my presence. I have reviewed the chart and agree that the record reflects my personal performance and is accurate and complete. Ofelia Muse MD. 4/15/2021. 3:49 PM.

## 2021-04-19 NOTE — ASSESSMENT & PLAN NOTE
No recurrence of supraventricular tachycardia in the past 6 years on long-term AV paramjit blocking agent therapy with verapamil sustained release 240 mg once a day.  She has recurrence of supraventricular tachycardia she is an excellent candidate to undergo catheter mediated ablation procedure.  I advised her to continue her current dose.  She also has a short acting verapamil if needed if she has recurrence of the arrhythmia.

## 2021-04-19 NOTE — ASSESSMENT & PLAN NOTE
The patient will continue rosuvastatin and follow-up with Dr. Saucedo.  Her CT heart calcium score is 0.   She has gained weight recently and she is following a diet to lose weight.  She is aware that she needs to follow a heart healthy Mediterranean diet and to exercise regularly.  She also has a family history of premature atherosclerotic coronary artery disease.  She reports that she has stress echocardiogram scheduled for next week which was ordered by Dr. Saucedo.  He will order the follow-up fasting lipid profile.

## 2021-04-21 ENCOUNTER — OFFICE VISIT (OUTPATIENT)
Dept: CARDIOLOGY | Facility: CLINIC | Age: 71
End: 2021-04-21
Payer: MEDICARE

## 2021-04-21 ENCOUNTER — HOSPITAL ENCOUNTER (OUTPATIENT)
Dept: CARDIOLOGY | Facility: CLINIC | Age: 71
Discharge: HOME | End: 2021-04-21
Attending: INTERNAL MEDICINE
Payer: MEDICARE

## 2021-04-21 VITALS
SYSTOLIC BLOOD PRESSURE: 102 MMHG | WEIGHT: 139 LBS | BODY MASS INDEX: 25.58 KG/M2 | HEIGHT: 62 IN | DIASTOLIC BLOOD PRESSURE: 64 MMHG

## 2021-04-21 VITALS
RESPIRATION RATE: 16 BRPM | WEIGHT: 142 LBS | OXYGEN SATURATION: 98 % | TEMPERATURE: 97.4 F | HEIGHT: 62 IN | DIASTOLIC BLOOD PRESSURE: 64 MMHG | BODY MASS INDEX: 26.13 KG/M2 | HEART RATE: 67 BPM | SYSTOLIC BLOOD PRESSURE: 102 MMHG

## 2021-04-21 DIAGNOSIS — I47.10 SUPRAVENTRICULAR TACHYCARDIA (CMS/HCC): Primary | ICD-10-CM

## 2021-04-21 DIAGNOSIS — I65.23 ARTERIOSCLEROSIS OF BOTH CAROTID ARTERIES: ICD-10-CM

## 2021-04-21 DIAGNOSIS — I47.10 SUPRAVENTRICULAR TACHYCARDIA (CMS/HCC): ICD-10-CM

## 2021-04-21 DIAGNOSIS — R01.1 MURMUR: ICD-10-CM

## 2021-04-21 LAB
AORTIC ROOT ANNULUS: 2.4 CM
AORTIC VALVE MEAN VELOCITY: 1.59 M/S
AORTIC VALVE VELOCITY TIME INTEGRAL: 54.5 CM
ASCENDING AORTA: 2.7 CM
AV MEAN GRADIENT: 12 MMHG
AV PEAK GRADIENT: 23 MMHG
AV PEAK VELOCITY-S: 2.41 M/S
AV VALVE AREA: 1.7 CM2
AV VALVE AREA: 1.92 CM2
BSA FOR ECHO PROCEDURE: 1.66 M2
DOP CALC LVOT STROKE VOLUME: 92.33 ML
E WAVE DECELERATION TIME: 173 MS
E/A RATIO: 0.7
E/E' RATIO: 11.8
E/LAT E' RATIO: 13.2
EDV (BP): 49.7 CM3
EF (A4C): 64.9 %
EF A2C: 63.2 %
EJECTION FRACTION: 65.2 %
ESV (BP): 17.3 CM3
LA ESV (BP): 57.2 CM3
LA ESV INDEX (A2C): 25 CM3/M2
LA ESV INDEX (BP): 34.46 CM3/M2
LA/AORTA RATIO: 1.46
LAAS-AP2: 16.4 CM2
LAAS-AP4: 23.3 CM2
LAD 2D: 3.5 CM
LALD A4C: 5.32 CM
LALD A4C: 6.09 CM
LAV-S: 41.5 CM3
LEFT ATRIUM VOLUME INDEX: 42.53 CM3/M2
LEFT ATRIUM VOLUME: 70.6 CM3
LEFT VENTRICLE DIASTOLIC VOLUME INDEX: 29.04 CM3/M2
LEFT VENTRICLE DIASTOLIC VOLUME: 48.2 CM3
LEFT VENTRICLE SYSTOLIC VOLUME INDEX: 10.18 CM3/M2
LEFT VENTRICLE SYSTOLIC VOLUME: 16.9 CM3
LV DIASTOLIC VOLUME: 46.5 CM3
LV ESV (APICAL 2 CHAMBER): 17 CM3
LVAD-AP2: 18.5 CM2
LVAD-AP4: 20.3 CM2
LVAS-AP2: 10.3 CM2
LVAS-AP4: 10.2 CM2
LVEDVI(A2C): 28.01 CM3/M2
LVEDVI(BP): 29.94 CM3/M2
LVESVI(A2C): 10.24 CM3/M2
LVESVI(BP): 10.42 CM3/M2
LVLD-AP2: 6.22 CM
LVLD-AP4: 6.94 CM
LVLS-AP2: 5.48 CM
LVLS-AP4: 5.22 CM
LVOT 2D: 1.7 CM
LVOT A: 2.27 CM2
LVOT MG: 7 MMHG
LVOT MV: 1.23 M/S
LVOT PEAK VELOCITY: 2.04 M/S
LVOT VTI: 40.7 CM
MV E'TISSUE VEL-LAT: 0.07 M/S
MV E'TISSUE VEL-MED: 0.08 M/S
MV PEAK A VEL: 1.38 M/S
MV PEAK E VEL: 0.95 M/S
PV PEAK GRADIENT: 5 MMHG
PV PV: 1.13 M/S
RVOT VMAX: 0.92 M/S
STRESS ANGINA INDEX: 0
STRESS BASELINE BP: NORMAL MMHG
STRESS BASELINE HR: 71 BPM
STRESS PERCENT HR: 97 %
STRESS POST ESTIMATED WORKLOAD: 7 METS
STRESS POST EXERCISE DUR MIN: 5 MIN
STRESS POST EXERCISE DUR SEC: 53 SEC
STRESS POST PEAK BP: NORMAL MMHG
STRESS POST PEAK HR: 144 BPM
STRESS TARGET HR: 127 BPM
TR MAX PG: 23 MMHG
TRICUSPID VALVE PEAK REGURGITATION VELOCITY: 2.39 M/S

## 2021-04-21 PROCEDURE — 93351 STRESS TTE COMPLETE: CPT | Performed by: INTERNAL MEDICINE

## 2021-04-21 PROCEDURE — 99214 OFFICE O/P EST MOD 30 MIN: CPT | Performed by: INTERNAL MEDICINE

## 2021-04-21 NOTE — LETTER
2021     Melonie Guy MD  [address]  [address]  [facility] [address]    Patient: Kelly Meeks  YOB: 1950  Date of Visit: 2021      Dear Dr. Guy:    Thank you for referring Kelly Meeks to me for evaluation. Below are my notes for this consultation.    If you have questions, please do not hesitate to call me. I look forward to following your patient along with you.         Sincerely,        Nilson Saucedo MD        CC: No Recipients  Nilson Saucedo MD  2021 11:03 AM  Sign when Signing Visit  [facility]    2021    Melonie Guy M.D.  [address] [facility] [address] [facility]  [address]  [place] [address]    Re:  KELLY MEEKS  :  1950    Dear Melonie:    It was my pleasure to see your patient, Kelly Meeks, in the [facility] today.  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    Clinically, she has been doing well.  She denies any chest pain, shortness of breath or palpitations.    As you know, she has undergone previous cardiac workup.  Her coronary calcium score was 0 in May 2016.  She did have a moderate amount of calcification involving in the thoracic aorta as well as the carotid distribution.  Given her family history of a father who had a myocardial infarction in his 30's and a mother who had a stroke at age 72, we elected to begin primary prevention therapy.    Her initial lipid panel revealed a total cholesterol of 228, , HDL 62 and triglycerides 102.  She initiated on combination therapy with atorvastatin 10 mg alternating with Zetia 10 mg.  Her total cholesterol is down to 208, , apoB 99, LDL-P 1197, a reasonable response to this therapy.  Of note, her LP(a) was elevated at 420.  This prompted a discussion and we will begin niacin with Endur-Acin 500 mg at the evening meal.    Over the past year the patient has been on the keto diet.  She is markedly increased her saturated  fats.  Her repeat lipid panel reflects this increased saturated fat intake.  Total cholesterol 218  HDL 73 triglycerides 73 APO B 121 and LDL P 1568.  I did a lengthy discussion today concerning cutting back on her saturated fat intake.  She needs to follow a low carbohydrate low saturated fat Mediterranean diet and increase her exercise program.  She is agreeable to this nutrition recommendation.    At this time recommending that she undergo a repeat coronary calcium score.  Her repeat calcium score in April 2019 was 0.  She remains in a very low low risk quartile.  We discussed her prevention program in detail.  Her repeat lipid panel revealed a total cholesterol 227  HDL 75 and triglycerides 70.  We both agreed to switch her from Lipitor to Crestor at 5 mg Monday Wednesday Friday.  We also agreed to increase her Endur-Acin 1000 mg the evening meal.  Our goal is to lower her LDL cholesterol to below 130.    Recently, she was complaining of chest pain localized to the right upper chest. This is atypical for CAD however we will schedule her for a stress echo.  Her stress echo was performed today was negative for myocardial ischemia or scar.  Her echo demonstrated that she has mild aortic valvular stenosis.    We will see her back in the office in 6 months with a repeat lipid panel.    PAST MEDICAL HISTORY:    Coronary calcium score 0, May 2016.    Stress echo in 2009 was a normal study.    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    PAST SURGICAL HISTORY:  None.    CURRENT MEDICATIONS:      Current Outpatient Medications:   •  albuterol 2.5 mg /3 mL (0.083 %) nebulizer solution, inhale 3 milliliter by nebulization route 3 times every day, Disp: , Rfl:   •  albuterol HFA (PROAIR HFA) 90 mcg/actuation inhaler, Inhale 2 puffs 4 (four) times a  day as needed., Disp: 3 Inhaler, Rfl: 3  •  budesonide (PULMICORT) 0.5 mg/2 mL nebulizer solution, Take 2 mL (0.5 mg total) by nebulization 2 (two) times a day. (Patient not taking: Reported on 4/15/2021 ), Disp: 60 mL, Rfl: 11  •  cholecalciferol, vitamin D3, (VITAMIN D3) 2,000 unit tablet, 2 (two) times a day.  , Disp: , Rfl:   •  clonazePAM (klonoPIN) 1 mg tablet, TAKE ONE (1) TABLET (1 MG TOTAL) BY MOUTH DAILY. , Disp: 90 tablet, Rfl: 3  •  conjugated estrogens (PREMARIN) 0.625 mg/gram vaginal cream, Insert into the vagina daily., Disp: 30 g, Rfl: 3  •  DOCOSAHEXANOIC ACID/EPA (FISH OIL ORAL), take  atotal 3,000  daily, Disp: , Rfl:   •  estradioL (ESTRACE) 0.01 % (0.1 mg/gram) vaginal cream, Insert 2 g into the vagina nightly., Disp: 42.5 g, Rfl: 3  •  ezetimibe (ZETIA) 10 mg tablet, TAKE ONE TABLET BY MOUTH 3 TIMES A WEEK (MON, WED, FRI), Disp: 39 tablet, Rfl: 3  •  famotidine (PEPCID) 20 mg tablet, TAKE 1 TABLET (20 MG TOTAL) BY MOUTH ONCE DAILY., Disp: 90 tablet, Rfl: 3  •  Lactobac no.41/Bifidobact no.7 (PROBIOTIC-10 ORAL), Take by mouth daily., Disp: , Rfl:   •  levothyroxine (SYNTHROID) 100 mcg tablet, TAKE ONE TABLET BY MOUTH EVERY DAY, Disp: 90 tablet, Rfl: 3  •  melatonin 5 mg tablet, Take by mouth nightly.  , Disp: , Rfl:   •  montelukast (SINGULAIR) 10 mg tablet, TAKE 1 TABLET (10 MG TOTAL) BY MOUTH NIGHTLY., Disp: 90 tablet, Rfl: 1  •  multivitamin tablet, Take by mouth daily., Disp: , Rfl:   •  niacin (ENDUR-ACIN) 500 mg CR tablet, Take 2 tablets (1,000 mg total) by mouth nightly., Disp: 180 tablet, Rfl: 3  •  rosuvastatin (CRESTOR) 5 mg tablet, Take 1 tablet (5 mg total) by mouth 3 (three) times a week (Mon, Wed, Fri)., Disp: 39 tablet, Rfl: 3  •  turmeric-turmeric root extract 450-50 mg capsule, Take by mouth 2 (two) times a day., Disp: , Rfl:   •  verapamiL (CALAN) 40 mg tablet, TAKE ONE TABLET BY MOUTH ONCE DAILY WHEN NEEDED, Disp: 90 tablet, Rfl: 1  •  verapamil (VERELAN) 240 mg 24 hr capsule,  Take 1 capsule (240 mg total) by mouth nightly., Disp: 90 capsule, Rfl: 3      SUPPLEMENTS:  Omega-3 fish oil, vitamin-D, coenzyme-Q10, Reservitol, curcumin, per Dr. Adrian Yang's recommendations, Endur-Acin 500 mg at dinner.    ALLERGIES:  High-dose statins.  Lipitor 20 mg daily caused significant myalgias.  We are going to attempt reintroducing 10 mg Monday, Wednesday, Friday.    FAMILY HISTORY:  Father had multiple myocardial infarctions in his 30's.  Mother with possible coronary artery disease and stroke.  Brother with hypertension.    SOCIAL HISTORY:  The patient lives with her adopted daughter.  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  Occasional glass of wine.  She quit smoking 15 years ago.    REVIEW OF SYSTEMS:  The patient's palpitations have been well controlled with the use of Verelan.  She follows with Dr. Ofelia Muse.  She has had difficulty with taking high-dose statins as they lead to symptoms of myalgias and leg pain.  She has a history of asthma and allergies.    PHYSICAL EXAMINATION:  The patient is a middle-aged female in no acute distress.    Weight:  142.  Blood pressure: 102/64  Heart rate:  70.  Temperature: Afebrile.  HEENT:  Unremarkable.  No xanthelasma or arcus.  Neck:  Supple, no JVD.  Lungs:  Clear to auscultation and percussion.  Cardiac:  Regular rate and rhythm without murmur or gallop.  Abdomen:  Soft, bowel sounds present, no organomegaly.  Extremities:  No edema, pulses intact.  Skin:  Warm and dry.  Neuro:  Alert and oriented X 3.    LABORATORY DATA:      Coronary score:  0, 2016, 0 in 2019 , thoracic aortic calcification.      Carotid ultrasound 2019:  Right Carotid Mild plaque  Normal velocities  Antegrade vertebral   Left Carotid Mild plaque  Normal velocities  Antegrade vertebral     Stress echocardiogram April 2021:  · An exercise stress test was performed following the Richard protocol. The patient reported no  symptoms and no angina during the stress test.  · There was no ST segment deviation noted during stress. Arrhythmias noted during stress: rare PVCs .There were no arrhythmias during recovery.  · Stress ECG does not meet criteria for ischemia.  · Post-stress echocardiogram does not meet criteria for ischemia. All ventricular walls become hyperdynamic and the ejection fraction improves.  · Normal ventricle size. Normal wall thickness. Preserved systolic function. Estimated EF 65%. Normal septal motion. No regional wall motion abnormalities. Grade I diastolic dysfunction. Diastolic inflow pattern consistent with impaired relaxation.  · Tricuspid aortic valve. Calcification of the aortic valve leaflets. No evidence of aortic valve regurgitation. . Mild aortic valve stenosis. AV mean gradient = 12.00 mmHg.  · Stress echocardiogram does not meet criteria for myocardial ischemia.    Advanced lipid panel March 2019:  Total cholesterol 218  HDL 73 triglycerides 73 APO B 121.  LP(a) 473.  Inflammation panel: Normal  Endothelial function panel: Abnormal  Metabolic panel: Vitamin D 69  Sterol panel: Hyper absorber of cholesterol.  Normal synthesizer of cholesterol.  Diabetes panel: Insulin resistance  Omega-3 index: 9.1    Lipid panel October 2019: Total cholesterol 227  HDL 75 triglycerides 70.    IMPRESSIONS/RECOMMENDATIONS:  1. Cardiovascular risk assessment.  The patient's coronary calcium score remains 0 which places her in a low risk category.  She remains in a very low risk quartile.  2. Thoracic aortic and carotid arterial calcification.  The patient is at risk for progressive atherosclerosis.   3. Polygenic hypercholesterolemia.   patient will switch to Crestor 5 mg Monday Wednesday Friday and continue Zetia 10 mg at Tuesday Thursday and Saturday.  Goal is an LDL cholesterol below 130.  We will repeat her lipid panel through [facility].  4. Elevated LP(a).  The patient will increase her  Endur-Acin to 1000 mg at dinner.  5. Vitamin-D deficiency.  Continue vitamin-D supplementation.  6. Statin intolerance.  7. Hyperabsorber of cholesterol.  Continue Zetia.  8. Hypothyroidism.  Continue Synthroid.  9.         Paroxysmal supraventricular tachycardia.  The patient is followed by Dr. Ofelia Muse.  She will continue verapamil.  10.       Mild aortic valvular stenosis.  The patient's echocardiogram performed today demonstrates mild aortic valve stenosis.    Summary: Kelly is demonstrating cardiovascular stability.  Her recent stress echo performed in the office demonstrates normal blood pressure and heart rhythm response to exercise.  Study was negative for ischemia.  She had no chest pain.  The study did demonstrate she has mild aortic valvular stenosis.  She is now followed by Dr. Muse for her PSVT.  She will continue verapamil.  Her carotid ultrasound demonstrates bilateral carotid arteries sclerosis.  We have initiated lipid-lowering therapy.  She will repeat her advanced lipid panel in 6 months.    This is a 30-minute patient encounter with greater than 50% time spent in care coordination counseling.    Sincerely,  Nilson Saucedo MD  4/21/2021    cc: Melonie Guy M.D., [address] [place] [address], [address], [place] [address], FAX: [phone]

## 2021-04-21 NOTE — PROGRESS NOTES
[facility]    2021    Melonie Guy M.D.  [address] [facility]  [address]  [place] [address]    Re:  ARABELLA MEEKS  :  1950    Dear Melonie:    It was my pleasure to see your patient, Arabella Meeks, in the [facility] today.  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    Clinically, she has been doing well.  She denies any chest pain, shortness of breath or palpitations.    As you know, she has undergone previous cardiac workup.  Her coronary calcium score was 0 in May 2016.  She did have a moderate amount of calcification involving in the thoracic aorta as well as the carotid distribution.  Given her family history of a father who had a myocardial infarction in his 30's and a mother who had a stroke at age 72, we elected to begin primary prevention therapy.    Her initial lipid panel revealed a total cholesterol of 228, , HDL 62 and triglycerides 102.  She initiated on combination therapy with atorvastatin 10 mg alternating with Zetia 10 mg.  Her total cholesterol is down to 208, , apoB 99, LDL-P 1197, a reasonable response to this therapy.  Of note, her LP(a) was elevated at 420.  This prompted a discussion and we will begin niacin with Endur-Acin 500 mg at the evening meal.    Over the past year the patient has been on the keto diet.  She is markedly increased her saturated fats.  Her repeat lipid panel reflects this increased saturated fat intake.  Total cholesterol 218  HDL 73 triglycerides 73 APO B 121 and LDL P 1568.  I did a lengthy discussion today concerning cutting back on her saturated fat intake.  She needs to follow a low carbohydrate low saturated fat Mediterranean diet and increase her exercise program.  She is agreeable to this nutrition recommendation.    At this time recommending that she undergo a repeat coronary calcium score.  Her repeat calcium score in 2019 was 0.  She remains in a very low  low risk quartile.  We discussed her prevention program in detail.  Her repeat lipid panel revealed a total cholesterol 227  HDL 75 and triglycerides 70.  We both agreed to switch her from Lipitor to Crestor at 5 mg Monday Wednesday Friday.  We also agreed to increase her Endur-Acin 1000 mg the evening meal.  Our goal is to lower her LDL cholesterol to below 130.    Recently, she was complaining of chest pain localized to the right upper chest. This is atypical for CAD however we will schedule her for a stress echo.  Her stress echo was performed today was negative for myocardial ischemia or scar.  Her echo demonstrated that she has mild aortic valvular stenosis.    We will see her back in the office in 6 months with a repeat lipid panel.    PAST MEDICAL HISTORY:    Coronary calcium score 0, May 2016.    Stress echo in 2009 was a normal study.    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    PAST SURGICAL HISTORY:  None.    CURRENT MEDICATIONS:      Current Outpatient Medications:   •  albuterol 2.5 mg /3 mL (0.083 %) nebulizer solution, inhale 3 milliliter by nebulization route 3 times every day, Disp: , Rfl:   •  albuterol HFA (PROAIR HFA) 90 mcg/actuation inhaler, Inhale 2 puffs 4 (four) times a day as needed., Disp: 3 Inhaler, Rfl: 3  •  budesonide (PULMICORT) 0.5 mg/2 mL nebulizer solution, Take 2 mL (0.5 mg total) by nebulization 2 (two) times a day. (Patient not taking: Reported on 4/15/2021 ), Disp: 60 mL, Rfl: 11  •  cholecalciferol, vitamin D3, (VITAMIN D3) 2,000 unit tablet, 2 (two) times a day.  , Disp: , Rfl:   •  clonazePAM (klonoPIN) 1 mg tablet, TAKE ONE (1) TABLET (1 MG TOTAL) BY MOUTH DAILY. , Disp: 90 tablet, Rfl: 3  •  conjugated estrogens (PREMARIN) 0.625 mg/gram vaginal cream, Insert into the vagina daily., Disp: 30 g, Rfl:  3  •  DOCOSAHEXANOIC ACID/EPA (FISH OIL ORAL), take  atotal 3,000  daily, Disp: , Rfl:   •  estradioL (ESTRACE) 0.01 % (0.1 mg/gram) vaginal cream, Insert 2 g into the vagina nightly., Disp: 42.5 g, Rfl: 3  •  ezetimibe (ZETIA) 10 mg tablet, TAKE ONE TABLET BY MOUTH 3 TIMES A WEEK (MON, WED, FRI), Disp: 39 tablet, Rfl: 3  •  famotidine (PEPCID) 20 mg tablet, TAKE 1 TABLET (20 MG TOTAL) BY MOUTH ONCE DAILY., Disp: 90 tablet, Rfl: 3  •  Lactobac no.41/Bifidobact no.7 (PROBIOTIC-10 ORAL), Take by mouth daily., Disp: , Rfl:   •  levothyroxine (SYNTHROID) 100 mcg tablet, TAKE ONE TABLET BY MOUTH EVERY DAY, Disp: 90 tablet, Rfl: 3  •  melatonin 5 mg tablet, Take by mouth nightly.  , Disp: , Rfl:   •  montelukast (SINGULAIR) 10 mg tablet, TAKE 1 TABLET (10 MG TOTAL) BY MOUTH NIGHTLY., Disp: 90 tablet, Rfl: 1  •  multivitamin tablet, Take by mouth daily., Disp: , Rfl:   •  niacin (ENDUR-ACIN) 500 mg CR tablet, Take 2 tablets (1,000 mg total) by mouth nightly., Disp: 180 tablet, Rfl: 3  •  rosuvastatin (CRESTOR) 5 mg tablet, Take 1 tablet (5 mg total) by mouth 3 (three) times a week (Mon, Wed, Fri)., Disp: 39 tablet, Rfl: 3  •  turmeric-turmeric root extract 450-50 mg capsule, Take by mouth 2 (two) times a day., Disp: , Rfl:   •  verapamiL (CALAN) 40 mg tablet, TAKE ONE TABLET BY MOUTH ONCE DAILY WHEN NEEDED, Disp: 90 tablet, Rfl: 1  •  verapamil (VERELAN) 240 mg 24 hr capsule, Take 1 capsule (240 mg total) by mouth nightly., Disp: 90 capsule, Rfl: 3      SUPPLEMENTS:  Omega-3 fish oil, vitamin-D, coenzyme-Q10, Reservitol, curcumin, per Dr. Adrian Yang's recommendations, Endur-Acin 500 mg at dinner.    ALLERGIES:  High-dose statins.  Lipitor 20 mg daily caused significant myalgias.  We are going to attempt reintroducing 10 mg Monday, Wednesday, Friday.    FAMILY HISTORY:  Father had multiple myocardial infarctions in his 30's.  Mother with possible coronary artery disease and stroke.  Brother with hypertension.    SOCIAL  HISTORY:  The patient lives with her adopted daughter.  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  Occasional glass of wine.  She quit smoking 15 years ago.    REVIEW OF SYSTEMS:  The patient's palpitations have been well controlled with the use of Verelan.  She follows with Dr. Ofelia Muse.  She has had difficulty with taking high-dose statins as they lead to symptoms of myalgias and leg pain.  She has a history of asthma and allergies.    PHYSICAL EXAMINATION:  The patient is a middle-aged female in no acute distress.    Weight:  142.  Blood pressure: 102/64  Heart rate:  70.  Temperature: Afebrile.  HEENT:  Unremarkable.  No xanthelasma or arcus.  Neck:  Supple, no JVD.  Lungs:  Clear to auscultation and percussion.  Cardiac:  Regular rate and rhythm without murmur or gallop.  Abdomen:  Soft, bowel sounds present, no organomegaly.  Extremities:  No edema, pulses intact.  Skin:  Warm and dry.  Neuro:  Alert and oriented X 3.    LABORATORY DATA:      Coronary score:  0, 2016, 0 in 2019 , thoracic aortic calcification.      Carotid ultrasound 2019:  Right Carotid Mild plaque  Normal velocities  Antegrade vertebral   Left Carotid Mild plaque  Normal velocities  Antegrade vertebral     Stress echocardiogram April 2021:  · An exercise stress test was performed following the Richard protocol. The patient reported no symptoms and no angina during the stress test.  · There was no ST segment deviation noted during stress. Arrhythmias noted during stress: rare PVCs .There were no arrhythmias during recovery.  · Stress ECG does not meet criteria for ischemia.  · Post-stress echocardiogram does not meet criteria for ischemia. All ventricular walls become hyperdynamic and the ejection fraction improves.  · Normal ventricle size. Normal wall thickness. Preserved systolic function. Estimated EF 65%. Normal septal motion. No regional wall motion abnormalities. Grade I diastolic  dysfunction. Diastolic inflow pattern consistent with impaired relaxation.  · Tricuspid aortic valve. Calcification of the aortic valve leaflets. No evidence of aortic valve regurgitation. . Mild aortic valve stenosis. AV mean gradient = 12.00 mmHg.  · Stress echocardiogram does not meet criteria for myocardial ischemia.    Advanced lipid panel March 2019:  Total cholesterol 218  HDL 73 triglycerides 73 APO B 121.  LP(a) 473.  Inflammation panel: Normal  Endothelial function panel: Abnormal  Metabolic panel: Vitamin D 69  Sterol panel: Hyper absorber of cholesterol.  Normal synthesizer of cholesterol.  Diabetes panel: Insulin resistance  Omega-3 index: 9.1    Lipid panel October 2019: Total cholesterol 227  HDL 75 triglycerides 70.    IMPRESSIONS/RECOMMENDATIONS:  1. Cardiovascular risk assessment.  The patient's coronary calcium score remains 0 which places her in a low risk category.  She remains in a very low risk quartile.  2. Thoracic aortic and carotid arterial calcification.  The patient is at risk for progressive atherosclerosis.   3. Polygenic hypercholesterolemia.   patient will switch to Crestor 5 mg Monday Wednesday Friday and continue Zetia 10 mg at Tuesday Thursday and Saturday.  Goal is an LDL cholesterol below 130.  We will repeat her lipid panel through [facility].  4. Elevated LP(a).  The patient will increase her Endur-Acin to 1000 mg at dinner.  5. Vitamin-D deficiency.  Continue vitamin-D supplementation.  6. Statin intolerance.  7. Hyperabsorber of cholesterol.  Continue Zetia.  8. Hypothyroidism.  Continue Synthroid.  9.         Paroxysmal supraventricular tachycardia.  The patient is followed by Dr. Ofelia Muse.  She will continue verapamil.  10.       Mild aortic valvular stenosis.  The patient's echocardiogram performed today demonstrates mild aortic valve stenosis.    Summary: Kelly is demonstrating cardiovascular stability.  Her recent stress echo  performed in the office demonstrates normal blood pressure and heart rhythm response to exercise.  Study was negative for ischemia.  She had no chest pain.  The study did demonstrate she has mild aortic valvular stenosis.  She is now followed by Dr. Muse for her PSVT.  She will continue verapamil.  Her carotid ultrasound demonstrates bilateral carotid arteries sclerosis.  We have initiated lipid-lowering therapy.  She will repeat her advanced lipid panel in 6 months.    This is a 30-minute patient encounter with greater than 50% time spent in care coordination counseling.    Sincerely,  Nilson Saucedo MD  4/21/2021    cc: Melonie Guy M.D., [address] [place] [address], [address], [place] [address], FAX: [phone]

## 2021-05-07 ENCOUNTER — TELEPHONE (OUTPATIENT)
Dept: CARDIOLOGY | Facility: CLINIC | Age: 71
End: 2021-05-07

## 2021-05-07 ENCOUNTER — TELEPHONE (OUTPATIENT)
Dept: FAMILY MEDICINE | Facility: CLINIC | Age: 71
End: 2021-05-07

## 2021-05-07 NOTE — TELEPHONE ENCOUNTER
LMOM informing pt Dr. Guy will complete pre-op clearance and asked if she needs a new EKG or blood work.  Surgery scheduled 5/14 and her last EKG was 4/15 so she should be ok.   Advised pt if she needs blood work she can come to the office today per Dr. Guy

## 2021-05-10 ENCOUNTER — TELEPHONE (OUTPATIENT)
Dept: FAMILY MEDICINE | Facility: CLINIC | Age: 71
End: 2021-05-10

## 2021-05-10 NOTE — TELEPHONE ENCOUNTER
Pt sent [facility] message after calling call center. Request has been faxed, pt was advise additional information was faxed.

## 2021-05-10 NOTE — TELEPHONE ENCOUNTER
Please contact pt.  Pre-op clearance (last office note and separate letter written by Dr. Guy) was faxed Friday

## 2021-06-01 RX ORDER — MONTELUKAST SODIUM 10 MG/1
TABLET ORAL
Qty: 90 TABLET | Refills: 3 | Status: SHIPPED | OUTPATIENT
Start: 2021-06-01 | End: 2022-07-19 | Stop reason: SDUPTHER

## 2021-06-01 NOTE — TELEPHONE ENCOUNTER
Medicine Refill Request    Last Office Visit: 12/21/2020  Last Telemedicine Visit: 4/15/2021 Melonie Guy MD    Next Office Visit: Visit date not found  Next Telemedicine Visit: Visit date not found         Current Outpatient Medications:   •  albuterol 2.5 mg /3 mL (0.083 %) nebulizer solution, inhale 3 milliliter by nebulization route 3 times every day, Disp: , Rfl:   •  albuterol HFA (PROAIR HFA) 90 mcg/actuation inhaler, Inhale 2 puffs 4 (four) times a day as needed., Disp: 3 Inhaler, Rfl: 3  •  cholecalciferol, vitamin D3, (VITAMIN D3) 2,000 unit tablet, 2 (two) times a day.  , Disp: , Rfl:   •  clonazePAM (klonoPIN) 1 mg tablet, TAKE ONE (1) TABLET (1 MG TOTAL) BY MOUTH DAILY. , Disp: 90 tablet, Rfl: 3  •  conjugated estrogens (PREMARIN) 0.625 mg/gram vaginal cream, Insert into the vagina daily., Disp: 30 g, Rfl: 3  •  DOCOSAHEXANOIC ACID/EPA (FISH OIL ORAL), take  atotal 3,000  daily, Disp: , Rfl:   •  estradioL (ESTRACE) 0.01 % (0.1 mg/gram) vaginal cream, Insert 2 g into the vagina nightly., Disp: 42.5 g, Rfl: 3  •  ezetimibe (ZETIA) 10 mg tablet, TAKE ONE TABLET BY MOUTH 3 TIMES A WEEK (MON, WED, FRI), Disp: 39 tablet, Rfl: 3  •  famotidine (PEPCID) 20 mg tablet, TAKE 1 TABLET (20 MG TOTAL) BY MOUTH ONCE DAILY., Disp: 90 tablet, Rfl: 3  •  Lactobac no.41/Bifidobact no.7 (PROBIOTIC-10 ORAL), Take by mouth daily., Disp: , Rfl:   •  levothyroxine (SYNTHROID) 100 mcg tablet, TAKE ONE TABLET BY MOUTH EVERY DAY, Disp: 90 tablet, Rfl: 3  •  melatonin 5 mg tablet, Take by mouth nightly.  , Disp: , Rfl:   •  montelukast (SINGULAIR) 10 mg tablet, TAKE 1 TABLET (10 MG TOTAL) BY MOUTH NIGHTLY., Disp: 90 tablet, Rfl: 1  •  multivitamin tablet, Take by mouth daily., Disp: , Rfl:   •  niacin (ENDUR-ACIN) 500 mg CR tablet, Take 2 tablets (1,000 mg total) by mouth nightly., Disp: 180 tablet, Rfl: 3  •  rosuvastatin (CRESTOR) 5 mg tablet, Take 1 tablet (5 mg total) by mouth 3 (three) times a week (Mon, Wed, Fri)., Disp:  39 tablet, Rfl: 3  •  turmeric-turmeric root extract 450-50 mg capsule, Take by mouth 2 (two) times a day., Disp: , Rfl:   •  verapamiL (CALAN) 40 mg tablet, TAKE ONE TABLET BY MOUTH ONCE DAILY WHEN NEEDED, Disp: 90 tablet, Rfl: 1  •  verapamil (VERELAN) 240 mg 24 hr capsule, Take 1 capsule (240 mg total) by mouth nightly., Disp: 90 capsule, Rfl: 3      BP Readings from Last 3 Encounters:   04/21/21 102/64   04/21/21 102/64   04/15/21 102/64       Recent Lab results:  Lab Results   Component Value Date    CHOL 227 (H) 10/17/2019   ,   Lab Results   Component Value Date    HDL 75 10/17/2019   ,   Lab Results   Component Value Date    LDLCALC 138 (H) 10/17/2019   ,   Lab Results   Component Value Date    TRIG 70 10/17/2019        Lab Results   Component Value Date    GLUCOSE 85 10/17/2019   , No results found for: HGBA1C      Lab Results   Component Value Date    CREATININE 0.8 10/17/2019       Lab Results   Component Value Date    TSH 3.280 10/28/2019

## 2021-07-15 ENCOUNTER — TELEPHONE (OUTPATIENT)
Dept: SCHEDULING | Facility: CLINIC | Age: 71
End: 2021-07-15

## 2021-10-05 RX ORDER — EZETIMIBE 10 MG/1
10 TABLET ORAL 3 TIMES WEEKLY
Qty: 39 TABLET | Refills: 3 | Status: SHIPPED | OUTPATIENT
Start: 2021-10-05 | End: 2022-06-01 | Stop reason: SDUPTHER

## 2021-10-05 RX ORDER — ROSUVASTATIN CALCIUM 5 MG/1
5 TABLET, COATED ORAL 3 TIMES WEEKLY
Qty: 39 TABLET | Refills: 3 | Status: SHIPPED | OUTPATIENT
Start: 2021-10-06 | End: 2022-06-01 | Stop reason: SDUPTHER

## 2021-10-06 RX ORDER — FAMOTIDINE 20 MG/1
TABLET, FILM COATED ORAL
Qty: 90 TABLET | Refills: 0 | Status: SHIPPED | OUTPATIENT
Start: 2021-10-06 | End: 2022-02-22 | Stop reason: SDUPTHER

## 2021-10-06 RX ORDER — VERAPAMIL HCL 240 MG
240 TABLET, EXTENDED RELEASE ORAL
Qty: 90 TABLET | Refills: 0 | Status: SHIPPED | OUTPATIENT
Start: 2021-10-06 | End: 2021-10-13 | Stop reason: SDUPTHER

## 2021-10-06 RX ORDER — LEVOTHYROXINE SODIUM 100 UG/1
100 TABLET ORAL
Qty: 90 TABLET | Refills: 0 | Status: SHIPPED | OUTPATIENT
Start: 2021-10-06 | End: 2022-02-21

## 2021-10-06 NOTE — TELEPHONE ENCOUNTER
Medicine Refill Request    Last Office Visit: 12/21/2020  Last Telemedicine Visit: 4/15/2021 Melonie Guy MD    Next Office Visit: 10/21/2021 Massiel West MD  Next Telemedicine Visit: Visit date not found         Current Outpatient Medications:   •  albuterol 2.5 mg /3 mL (0.083 %) nebulizer solution, inhale 3 milliliter by nebulization route 3 times every day, Disp: , Rfl:   •  albuterol HFA (PROAIR HFA) 90 mcg/actuation inhaler, Inhale 2 puffs 4 (four) times a day as needed., Disp: 3 Inhaler, Rfl: 3  •  cholecalciferol, vitamin D3, (VITAMIN D3) 2,000 unit tablet, 2 (two) times a day.  , Disp: , Rfl:   •  clonazePAM (klonoPIN) 1 mg tablet, TAKE ONE (1) TABLET (1 MG TOTAL) BY MOUTH DAILY. , Disp: 90 tablet, Rfl: 3  •  conjugated estrogens (PREMARIN) 0.625 mg/gram vaginal cream, Insert into the vagina daily., Disp: 30 g, Rfl: 3  •  DOCOSAHEXANOIC ACID/EPA (FISH OIL ORAL), take  atotal 3,000  daily, Disp: , Rfl:   •  estradioL (ESTRACE) 0.01 % (0.1 mg/gram) vaginal cream, Insert 2 g into the vagina nightly., Disp: 42.5 g, Rfl: 3  •  ezetimibe (ZETIA) 10 mg tablet, Take 1 tablet (10 mg total) by mouth 3 (three) times a week (Tue, Thu, Sat)., Disp: 39 tablet, Rfl: 3  •  famotidine (PEPCID) 20 mg tablet, TAKE 1 TABLET (20 MG TOTAL) BY MOUTH ONCE DAILY., Disp: 90 tablet, Rfl: 3  •  Lactobac no.41/Bifidobact no.7 (PROBIOTIC-10 ORAL), Take by mouth daily., Disp: , Rfl:   •  levothyroxine (SYNTHROID) 100 mcg tablet, TAKE ONE TABLET BY MOUTH EVERY DAY, Disp: 90 tablet, Rfl: 3  •  melatonin 5 mg tablet, Take by mouth nightly.  , Disp: , Rfl:   •  montelukast (SINGULAIR) 10 mg tablet, TAKE ONE (1) TABLET (10 MG TOTAL) BY MOUTH NIGHTLY. , Disp: 90 tablet, Rfl: 3  •  multivitamin tablet, Take by mouth daily., Disp: , Rfl:   •  niacin (ENDUR-ACIN) 500 mg CR tablet, Take 2 tablets (1,000 mg total) by mouth nightly., Disp: 180 tablet, Rfl: 3  •  rosuvastatin (CRESTOR) 5 mg tablet, Take 1 tablet (5 mg total) by mouth 3 (three)  times a week (Mon, Wed, Fri)., Disp: 39 tablet, Rfl: 3  •  turmeric-turmeric root extract 450-50 mg capsule, Take by mouth 2 (two) times a day., Disp: , Rfl:   •  verapamiL (CALAN) 40 mg tablet, TAKE ONE TABLET BY MOUTH ONCE DAILY WHEN NEEDED, Disp: 90 tablet, Rfl: 1  •  verapamil (VERELAN) 240 mg 24 hr capsule, Take 1 capsule (240 mg total) by mouth nightly., Disp: 90 capsule, Rfl: 3  •  verapamil SR (CALAN-SR) 240 mg CR tablet, TAKE ONE TABLET BY MOUTH DAILY , Disp: 90 tablet, Rfl: 1      BP Readings from Last 3 Encounters:   04/21/21 102/64   04/21/21 102/64   04/15/21 102/64       Recent Lab results:  Lab Results   Component Value Date    CHOL 227 (H) 10/17/2019   ,   Lab Results   Component Value Date    HDL 75 10/17/2019   ,   Lab Results   Component Value Date    LDLCALC 138 (H) 10/17/2019   ,   Lab Results   Component Value Date    TRIG 70 10/17/2019        Lab Results   Component Value Date    GLUCOSE 85 10/17/2019   , No results found for: HGBA1C      Lab Results   Component Value Date    CREATININE 0.8 10/17/2019       Lab Results   Component Value Date    TSH 3.280 10/28/2019

## 2021-10-12 RX ORDER — ALBUTEROL SULFATE 90 UG/1
INHALANT RESPIRATORY (INHALATION)
Qty: 8.5 EACH | Refills: 3 | Status: SHIPPED | OUTPATIENT
Start: 2021-10-12 | End: 2022-10-19

## 2021-10-13 RX ORDER — CLONAZEPAM 1 MG/1
1 TABLET ORAL DAILY
Qty: 90 TABLET | Refills: 3 | Status: CANCELLED | OUTPATIENT
Start: 2021-10-13

## 2021-10-13 RX ORDER — VERAPAMIL HCL 240 MG
240 TABLET, EXTENDED RELEASE ORAL
Qty: 90 TABLET | Refills: 1 | Status: SHIPPED | OUTPATIENT
Start: 2021-10-13 | End: 2022-09-29 | Stop reason: SDUPTHER

## 2021-10-13 NOTE — TELEPHONE ENCOUNTER
I have signed the refill for verapamil.  PDMP queried. It looks like she filled #90 tablet scripts 8/17/20, 3/10/21, 7/27/21 and has one refill remaining  I do not routinely write prescriptions for clonazepma in the quantities Dr Guy was recommending for chronic use, as this class of medication is habit-forming. As an internist, I prescribe benzodiazepines only on a short-term or intermittent basis to avoid addiction. I am usually willing to provide a script for  #15 tablets with no refills, but it looks like she still has a refill remaining for #90 - so it unclear to me why she is requesting another prescripton. Larger quantities will need to be prescribed by a mental health provider. We can certainly discuss this in person if the patient would like to make an office appointment.  AO

## 2021-10-18 ENCOUNTER — TELEPHONE (OUTPATIENT)
Dept: FAMILY MEDICINE | Facility: CLINIC | Age: 71
End: 2021-10-18

## 2021-10-18 NOTE — TELEPHONE ENCOUNTER
If she feels she cannot wait until her appointment on Thursday then she can certainly come in to the urgent care - they can evaluate this

## 2021-10-18 NOTE — TELEPHONE ENCOUNTER
Pt called stating that her elbow has been black & blue for 2 days & she is very concerned looking for medical advice.Please advise    Pt contact [phone]

## 2021-10-20 RX ORDER — CLONAZEPAM 1 MG/1
1 TABLET ORAL DAILY
Qty: 90 TABLET | Refills: 3 | Status: CANCELLED | OUTPATIENT
Start: 2021-10-20

## 2021-10-20 NOTE — TELEPHONE ENCOUNTER
Medicine Refill Request    Last Office Visit: Visit date not found  Last Telemedicine Visit: Visit date not found    Next Office Visit: Visit date not found  Next Telemedicine Visit: Visit date not found   Has appt 10/21/2021       Current Outpatient Medications:   •  albuterol 2.5 mg /3 mL (0.083 %) nebulizer solution, inhale 3 milliliter by nebulization route 3 times every day, Disp: , Rfl:   •  albuterol HFA 90 mcg/actuation inhaler, INHALE TWO (2) PUFFS BY MOUTH EVERY SIX (6) HOURS, Disp: 8.5 each, Rfl: 3  •  cholecalciferol, vitamin D3, (VITAMIN D3) 2,000 unit tablet, 2 (two) times a day.  , Disp: , Rfl:   •  clonazePAM (klonoPIN) 1 mg tablet, TAKE ONE (1) TABLET (1 MG TOTAL) BY MOUTH DAILY. , Disp: 90 tablet, Rfl: 3  •  conjugated estrogens (PREMARIN) 0.625 mg/gram vaginal cream, Insert into the vagina daily., Disp: 30 g, Rfl: 3  •  DOCOSAHEXANOIC ACID/EPA (FISH OIL ORAL), take  atotal 3,000  daily, Disp: , Rfl:   •  estradioL (ESTRACE) 0.01 % (0.1 mg/gram) vaginal cream, Insert 2 g into the vagina nightly., Disp: 42.5 g, Rfl: 3  •  ezetimibe (ZETIA) 10 mg tablet, Take 1 tablet (10 mg total) by mouth 3 (three) times a week (Tue, Thu, Sat)., Disp: 39 tablet, Rfl: 3  •  famotidine (PEPCID) 20 mg tablet, TAKE 1 TABLET (20 MG TOTAL) BY MOUTH ONCE DAILY., Disp: 90 tablet, Rfl: 0  •  Lactobac no.41/Bifidobact no.7 (PROBIOTIC-10 ORAL), Take by mouth daily., Disp: , Rfl:   •  levothyroxine (SYNTHROID) 100 mcg tablet, Take 1 tablet (100 mcg total) by mouth once daily., Disp: 90 tablet, Rfl: 0  •  melatonin 5 mg tablet, Take by mouth nightly.  , Disp: , Rfl:   •  montelukast (SINGULAIR) 10 mg tablet, TAKE ONE (1) TABLET (10 MG TOTAL) BY MOUTH NIGHTLY. , Disp: 90 tablet, Rfl: 3  •  multivitamin tablet, Take by mouth daily., Disp: , Rfl:   •  niacin (ENDUR-ACIN) 500 mg CR tablet, Take 2 tablets (1,000 mg total) by mouth nightly., Disp: 180 tablet, Rfl: 3  •  rosuvastatin (CRESTOR) 5 mg tablet, Take 1 tablet (5 mg total)  by mouth 3 (three) times a week (Mon, Wed, Fri)., Disp: 39 tablet, Rfl: 3  •  turmeric-turmeric root extract 450-50 mg capsule, Take by mouth 2 (two) times a day., Disp: , Rfl:   •  verapamiL (CALAN) 40 mg tablet, TAKE ONE TABLET BY MOUTH ONCE DAILY WHEN NEEDED, Disp: 90 tablet, Rfl: 1  •  verapamil (VERELAN) 240 mg 24 hr capsule, Take 1 capsule (240 mg total) by mouth nightly., Disp: 90 capsule, Rfl: 3  •  verapamil  mg CR tablet, Take 1 tablet (240 mg total) by mouth once daily., Disp: 90 tablet, Rfl: 1      BP Readings from Last 3 Encounters:   04/21/21 102/64   04/21/21 102/64   04/15/21 102/64       Recent Lab results:  Lab Results   Component Value Date    CHOL 227 (H) 10/17/2019   ,   Lab Results   Component Value Date    HDL 75 10/17/2019   ,   Lab Results   Component Value Date    LDLCALC 138 (H) 10/17/2019   ,   Lab Results   Component Value Date    TRIG 70 10/17/2019        Lab Results   Component Value Date    GLUCOSE 85 10/17/2019   , No results found for: HGBA1C      Lab Results   Component Value Date    CREATININE 0.8 10/17/2019       Lab Results   Component Value Date    TSH 3.280 10/28/2019

## 2021-10-20 NOTE — TELEPHONE ENCOUNTER
PDMP and chart notes reviewed. Klonopin prescribed 3/10/21 with 3 refills  First refill used 7/27/21. .Request for another prescription now reflects escalation - prior scripts filled 12/29/19 for 90 and 8/17/20 prior to the 3/10/21.   There is no documentation in chart that I can find addressing chronic benzodiazepine use.   I do not routinely write prescriptions for benzodiazepines in the quantities Dr Guy was recommending for chronic use, as this class of medication is habit-forming. As an internist, I prescribe benzodiazepines only on a short-term or intermittent basis to avoid addiction. I am willing to provide a script for  #15 tablets with no refills. Larger quantities will need to be prescribed by a mental health provider. We can certainly discuss this in person if the patient would like at her office appointment which is scheduled for tomorrow.  Massiel West MD  10/20/21

## 2021-10-21 ENCOUNTER — OFFICE VISIT (OUTPATIENT)
Dept: FAMILY MEDICINE | Facility: CLINIC | Age: 71
End: 2021-10-21
Payer: MEDICARE

## 2021-10-21 VITALS
HEART RATE: 77 BPM | SYSTOLIC BLOOD PRESSURE: 120 MMHG | HEIGHT: 62 IN | DIASTOLIC BLOOD PRESSURE: 70 MMHG | TEMPERATURE: 98.6 F | RESPIRATION RATE: 15 BRPM | BODY MASS INDEX: 25.97 KG/M2 | OXYGEN SATURATION: 97 %

## 2021-10-21 DIAGNOSIS — R26.89 BALANCE PROBLEM: ICD-10-CM

## 2021-10-21 DIAGNOSIS — Z23 NEEDS FLU SHOT: ICD-10-CM

## 2021-10-21 DIAGNOSIS — I47.10 SVT (SUPRAVENTRICULAR TACHYCARDIA) (CMS/HCC): ICD-10-CM

## 2021-10-21 DIAGNOSIS — M26.623 BILATERAL TEMPOROMANDIBULAR JOINT PAIN: Primary | ICD-10-CM

## 2021-10-21 PROCEDURE — G0008 ADMIN INFLUENZA VIRUS VAC: HCPCS | Performed by: INTERNAL MEDICINE

## 2021-10-21 PROCEDURE — 99215 OFFICE O/P EST HI 40 MIN: CPT | Mod: 25 | Performed by: INTERNAL MEDICINE

## 2021-10-21 PROCEDURE — 90694 VACC AIIV4 NO PRSRV 0.5ML IM: CPT | Performed by: INTERNAL MEDICINE

## 2021-10-21 RX ORDER — CLONAZEPAM 0.5 MG/1
0.5 TABLET ORAL DAILY
Qty: 30 TABLET | Refills: 1 | Status: SHIPPED | OUTPATIENT
Start: 2021-10-21 | End: 2022-05-26

## 2021-10-21 NOTE — PROGRESS NOTES
71 yr old pt of Dr Guy presents for JEREMÍAS  Last visit with Dr Guy for TMJ pain in 4/2021, referred to PT  Sees Dr Saucedo for mgmt, HL  Cac score 0 in 2019. Neg stress echo 4/2021  Sees Dr Muse for hx SVT  Sees Dr Duque for gyn care  Hx hypothyroid on replacement    Pt requested refill for klonopin on 10/20/21, one day prior to visit today. PDMP queried. Klonopin prescribed 3/10/21 with 3 refills  First refill used 7/27/21. .Request for another prescription now reflects escalation - prior scripts filled 12/29/19 for 90 and 8/17/20 prior to the 3/10/21.   Review of EMR today and there is no documentation in chart that I can find addressing chronic benzodiazepine use.     Notes trouble with memory     Daughter living with her which is causing stress    mammo 9/2021 Dot Orozco   Rodrigo 1/2017 repeat in 10 yrs  Hx osteopenia, dexa ordered 12/2020    Patient Active Problem List   Diagnosis   • Asthma   • Supraventricular tachycardia (CMS/HCC)   • Hypothyroidism   • Murmur   • Esophageal reflux   • Elevated Lp(a)   • Arteriosclerosis of both carotid arteries   • Raynaud's disease without gangrene   • Insulin resistance   • Homocystinuria (CMS/HCC)   • Dyslipidemia     Past Medical History:   Diagnosis Date   • Anxiety    • Asthma    • Coronary artery disease    • Hyperlipidemia    • Hypothyroidism      Past Surgical History:   Procedure Laterality Date   • ADENOIDECTOMY     • CARDIAC CATHETERIZATION  1976   • TONSILLECTOMY       Social History     Socioeconomic History   • Marital status: Single     Spouse name: None   • Number of children: None   • Years of education: None   • Highest education level: None   Occupational History   • None   Tobacco Use   • Smoking status: Former Smoker   • Smokeless tobacco: Never Used   • Tobacco comment: stopped @ age 31   Substance and Sexual Activity   • Alcohol use: Yes     Comment: daily   • Drug use: No   • Sexual activity: Defer   Other Topics Concern   • None   Social  History Narrative    Lives alone, , has a daughter         Active - walks on treadmill, has dog - walks him regularly      Works as realtor     Social Determinants of Health     Financial Resource Strain:    • Difficulty of Paying Living Expenses: Not on file   Food Insecurity:    • Worried About Running Out of Food in the Last Year: Not on file   • Ran Out of Food in the Last Year: Not on file   Transportation Needs:    • Lack of Transportation (Medical): Not on file   • Lack of Transportation (Non-Medical): Not on file   Physical Activity:    • Days of Exercise per Week: Not on file   • Minutes of Exercise per Session: Not on file   Stress:    • Feeling of Stress : Not on file   Social Connections:    • Frequency of Communication with Friends and Family: Not on file   • Frequency of Social Gatherings with Friends and Family: Not on file   • Attends [employer] Services: Not on file   • Active Member of Clubs or Organizations: Not on file   • Attends Club or Organization Meetings: Not on file   • Marital Status: Not on file   Intimate Partner Violence:    • Fear of Current or Ex-Partner: Not on file   • Emotionally Abused: Not on file   • Physically Abused: Not on file   • Sexually Abused: Not on file   Housing Stability:    • Unable to Pay for Housing in the Last Year: Not on file   • Number of Places Lived in the Last Year: Not on file   • Unstable Housing in the Last Year: Not on file     Family History   Problem Relation Age of Onset   • Heart attack Biological Mother    • Stroke Biological Mother    • Heart attack Biological Father      Review of systems negative in detail except as noted in CC and HPI      General: non-toxemic  Vitals:    10/21/21 1143   BP: 120/70   Pulse: 77   Resp: 15   Temp: 37 °C (98.6 °F)   SpO2: 97%       Gait: normal   Conjunctiva: pink Sclera: white EOMI  Skin: no jaundice, no visable rash  Resp: no accessory muscle use cta  rrr s m  Joints: no visable deformities  No  edema  Mental Status: alert and oriented    1. Bilateral temporomandibular joint pain    - Ambulatory referral to Physical Therapy; Future    2. Balance problem    - Ambulatory referral to Physical Therapy; Future    3. SVT (supraventricular tachycardia) (CMS/HCC)  Pt reports that she is taking clonazepam for SVT prevention. Asked her to please see Dr Muse in follow up and discuss if this is still advised, as chronic benzo use has increase risk for dementia and hip fractures and there is a black box warning for addiction. Therefore, try to reduce dose to half of 0.5 mg and see if sx are controlled. Consider weaning down slowly if Dr Muse agrees. If not, can continue under EP supervision  - clonazePAM (KlonoPIN) 0.5 mg tablet; Take 1 tablet (0.5 mg total) by mouth daily.  Dispense: 30 tablet; Refill: 1    4. Needs flu shot    - Influenza vaccine Quad Adjuvanted 65 and Older (FluAd Quad)  .timests    I spent 40 minutes on this date of service performing the following activities: obtaining history, performing examination, entering orders, documenting, preparing for visit, obtaining / reviewing records and independently reviewing study/studies.  Massiel West MD  10/22/21

## 2021-11-09 RX ORDER — VERAPAMIL HYDROCHLORIDE 40 MG/1
TABLET ORAL
Qty: 90 TABLET | Refills: 3 | Status: SHIPPED | OUTPATIENT
Start: 2021-11-09 | End: 2023-09-14

## 2021-11-09 NOTE — TELEPHONE ENCOUNTER
Medicine Refill Request    Last Office Visit: 10/21/2021  Last Telemedicine Visit: 4/15/2021 Melonie Guy MD    Next Office Visit: Visit date not found  Next Telemedicine Visit: Visit date not found         Current Outpatient Medications:   •  albuterol 2.5 mg /3 mL (0.083 %) nebulizer solution, inhale 3 milliliter by nebulization route 3 times every day, Disp: , Rfl:   •  albuterol HFA 90 mcg/actuation inhaler, INHALE TWO (2) PUFFS BY MOUTH EVERY SIX (6) HOURS, Disp: 8.5 each, Rfl: 3  •  cholecalciferol, vitamin D3, (VITAMIN D3) 2,000 unit tablet, 2 (two) times a day.  , Disp: , Rfl:   •  clonazePAM (KlonoPIN) 0.5 mg tablet, Take 1 tablet (0.5 mg total) by mouth daily., Disp: 30 tablet, Rfl: 1  •  clonazePAM (klonoPIN) 1 mg tablet, TAKE ONE (1) TABLET (1 MG TOTAL) BY MOUTH DAILY. , Disp: 90 tablet, Rfl: 3  •  conjugated estrogens (PREMARIN) 0.625 mg/gram vaginal cream, Insert into the vagina daily., Disp: 30 g, Rfl: 3  •  DOCOSAHEXANOIC ACID/EPA (FISH OIL ORAL), take  atotal 3,000  daily, Disp: , Rfl:   •  estradioL (ESTRACE) 0.01 % (0.1 mg/gram) vaginal cream, Insert 2 g into the vagina nightly., Disp: 42.5 g, Rfl: 3  •  ezetimibe (ZETIA) 10 mg tablet, Take 1 tablet (10 mg total) by mouth 3 (three) times a week (Tue, Thu, Sat)., Disp: 39 tablet, Rfl: 3  •  famotidine (PEPCID) 20 mg tablet, TAKE 1 TABLET (20 MG TOTAL) BY MOUTH ONCE DAILY., Disp: 90 tablet, Rfl: 0  •  Lactobac no.41/Bifidobact no.7 (PROBIOTIC-10 ORAL), Take by mouth daily., Disp: , Rfl:   •  levothyroxine (SYNTHROID) 100 mcg tablet, Take 1 tablet (100 mcg total) by mouth once daily., Disp: 90 tablet, Rfl: 0  •  melatonin 5 mg tablet, Take by mouth nightly.  , Disp: , Rfl:   •  montelukast (SINGULAIR) 10 mg tablet, TAKE ONE (1) TABLET (10 MG TOTAL) BY MOUTH NIGHTLY. , Disp: 90 tablet, Rfl: 3  •  multivitamin tablet, Take by mouth daily., Disp: , Rfl:   •  niacin (ENDUR-ACIN) 500 mg CR tablet, Take 2 tablets (1,000 mg total) by mouth nightly.,  Disp: 180 tablet, Rfl: 3  •  rosuvastatin (CRESTOR) 5 mg tablet, Take 1 tablet (5 mg total) by mouth 3 (three) times a week (Mon, Wed, Fri)., Disp: 39 tablet, Rfl: 3  •  turmeric-turmeric root extract 450-50 mg capsule, Take by mouth 2 (two) times a day., Disp: , Rfl:   •  verapamiL (CALAN) 40 mg tablet, TAKE ONE TABLET BY MOUTH ONCE DAILY WHEN NEEDED, Disp: 90 tablet, Rfl: 1  •  verapamil (VERELAN) 240 mg 24 hr capsule, Take 1 capsule (240 mg total) by mouth nightly., Disp: 90 capsule, Rfl: 3  •  verapamil  mg CR tablet, Take 1 tablet (240 mg total) by mouth once daily., Disp: 90 tablet, Rfl: 1      BP Readings from Last 3 Encounters:   10/21/21 120/70   04/21/21 102/64   04/21/21 102/64       Recent Lab results:  Lab Results   Component Value Date    CHOL 227 (H) 10/17/2019   ,   Lab Results   Component Value Date    HDL 75 10/17/2019   ,   Lab Results   Component Value Date    LDLCALC 138 (H) 10/17/2019   ,   Lab Results   Component Value Date    TRIG 70 10/17/2019        Lab Results   Component Value Date    GLUCOSE 85 10/17/2019   , No results found for: HGBA1C      Lab Results   Component Value Date    CREATININE 0.8 10/17/2019       Lab Results   Component Value Date    TSH 3.280 10/28/2019

## 2021-12-10 ENCOUNTER — TELEPHONE (OUTPATIENT)
Dept: FAMILY MEDICINE | Facility: CLINIC | Age: 71
End: 2021-12-10
Payer: MEDICARE

## 2021-12-10 NOTE — TELEPHONE ENCOUNTER
It is 3:30 on Friday afternoon and I do not see that there are any more open visits available this week. If she has ear pain and cough she should be seen in person to be appropriately evaluated. The [facility] is open and they can also send a covid and/or flu test if necessary.

## 2021-12-10 NOTE — TELEPHONE ENCOUNTER
Patient is requesting for a telemed appt for swollen glands, cough, ear ache. Please contact patient.

## 2021-12-14 ENCOUNTER — TELEPHONE (OUTPATIENT)
Dept: FAMILY MEDICINE | Facility: CLINIC | Age: 71
End: 2021-12-14
Payer: MEDICARE

## 2021-12-14 NOTE — TELEPHONE ENCOUNTER
The antibiotic should still be at the pharmacy for her to pick it up - I think itw as just prescribed Friday? She should call the pharmacist to clarify   no

## 2021-12-14 NOTE — TELEPHONE ENCOUNTER
Pt developed cold last week. Went to , thought she was developing ear infection, was prescribed antibiotic.  Never picked it up because pharmacy was closed.  Family who she caught the cold from were all put on zpack and now they are all fine.  Pt still has runny nose, cough, coughing that ribs hurt and throat is sore from coughing so hard.  COVID negative.  Please advise

## 2022-02-18 DIAGNOSIS — I47.10 SVT (SUPRAVENTRICULAR TACHYCARDIA) (CMS/HCC): ICD-10-CM

## 2022-02-18 NOTE — TELEPHONE ENCOUNTER
I think she means loratadine? This is generic Claritin. It is fine to take and it if works then it confirms this is likely allergies.  It is available over the counter, 10 mg once daily  AO

## 2022-02-18 NOTE — TELEPHONE ENCOUNTER
Pt called and has a cough and sneezing, comes every winter.  Was told it was cold induced asthma.  She thinks it is an allergy.  Her friend said she took olodataime and it worked really well for her. She is taking Municex D but it does nothing.  By April it disappers.  Wants to know what else she can do, please advise   No

## 2022-02-22 ENCOUNTER — TELEPHONE (OUTPATIENT)
Dept: FAMILY MEDICINE | Facility: CLINIC | Age: 72
End: 2022-02-22
Payer: MEDICARE

## 2022-02-22 RX ORDER — FAMOTIDINE 20 MG/1
TABLET, FILM COATED ORAL
Qty: 90 TABLET | Refills: 0 | Status: SHIPPED | OUTPATIENT
Start: 2022-02-22 | End: 2022-05-27 | Stop reason: SDUPTHER

## 2022-02-22 RX ORDER — CLONAZEPAM 0.5 MG/1
0.5 TABLET ORAL DAILY
Qty: 30 TABLET | Refills: 0 | Status: SHIPPED | OUTPATIENT
Start: 2022-02-22 | End: 2022-05-26

## 2022-02-22 RX ORDER — CETIRIZINE HYDROCHLORIDE 10 MG/1
10 TABLET ORAL NIGHTLY
Qty: 90 TABLET | Refills: 1 | Status: SHIPPED | OUTPATIENT
Start: 2022-02-22 | End: 2025-02-11

## 2022-02-22 RX ORDER — CLONAZEPAM 0.5 MG/1
0.5 TABLET ORAL DAILY
Qty: 30 TABLET | Refills: 0 | Status: CANCELLED | OUTPATIENT
Start: 2022-02-22 | End: 2022-03-24

## 2022-02-22 NOTE — TELEPHONE ENCOUNTER
Spoke with pt and she would like to try the Tessalon Pearls prescriptions sent to her [facility]. Also pt requesting refills on Klonopin and Famotidine.    Medicine Refill Request    Last Office: 10/21/2021   Last Consult Visit: Visit date not found  Last Telemedicine Visit: 4/15/2021 Melonie Guy MD    Next Appointment: Visit date not found      Current Outpatient Medications:   •  albuterol 2.5 mg /3 mL (0.083 %) nebulizer solution, inhale 3 milliliter by nebulization route 3 times every day, Disp: , Rfl:   •  albuterol HFA 90 mcg/actuation inhaler, INHALE TWO (2) PUFFS BY MOUTH EVERY SIX (6) HOURS, Disp: 8.5 each, Rfl: 3  •  cholecalciferol, vitamin D3, (VITAMIN D3) 2,000 unit tablet, 2 (two) times a day.  , Disp: , Rfl:   •  clonazePAM (KlonoPIN) 0.5 mg tablet, Take 1 tablet (0.5 mg total) by mouth daily., Disp: 30 tablet, Rfl: 1  •  clonazePAM (klonoPIN) 1 mg tablet, TAKE ONE (1) TABLET (1 MG TOTAL) BY MOUTH DAILY. , Disp: 90 tablet, Rfl: 3  •  conjugated estrogens (PREMARIN) 0.625 mg/gram vaginal cream, Insert into the vagina daily., Disp: 30 g, Rfl: 3  •  DOCOSAHEXANOIC ACID/EPA (FISH OIL ORAL), take  atotal 3,000  daily, Disp: , Rfl:   •  estradioL (ESTRACE) 0.01 % (0.1 mg/gram) vaginal cream, Insert 2 g into the vagina nightly., Disp: 42.5 g, Rfl: 3  •  ezetimibe (ZETIA) 10 mg tablet, Take 1 tablet (10 mg total) by mouth 3 (three) times a week (Tue, Thu, Sat)., Disp: 39 tablet, Rfl: 3  •  famotidine (PEPCID) 20 mg tablet, TAKE 1 TABLET (20 MG TOTAL) BY MOUTH ONCE DAILY., Disp: 90 tablet, Rfl: 0  •  Lactobac no.41/Bifidobact no.7 (PROBIOTIC-10 ORAL), Take by mouth daily., Disp: , Rfl:   •  levothyroxine (SYNTHROID) 100 mcg tablet, TAKE ONE (1) TABLET (100 MCG TOTAL) BY MOUTH ONCE DAILY., Disp: 90 tablet, Rfl: 0  •  melatonin 5 mg tablet, Take by mouth nightly.  , Disp: , Rfl:   •  montelukast (SINGULAIR) 10 mg tablet, TAKE ONE (1) TABLET (10 MG TOTAL) BY MOUTH NIGHTLY. , Disp: 90 tablet, Rfl: 3  •   multivitamin tablet, Take by mouth daily., Disp: , Rfl:   •  niacin (ENDUR-ACIN) 500 mg CR tablet, Take 2 tablets (1,000 mg total) by mouth nightly., Disp: 180 tablet, Rfl: 3  •  rosuvastatin (CRESTOR) 5 mg tablet, Take 1 tablet (5 mg total) by mouth 3 (three) times a week (Mon, Wed, Fri)., Disp: 39 tablet, Rfl: 3  •  turmeric-turmeric root extract 450-50 mg capsule, Take by mouth 2 (two) times a day., Disp: , Rfl:   •  verapamil (VERELAN) 240 mg 24 hr capsule, Take 1 capsule (240 mg total) by mouth nightly., Disp: 90 capsule, Rfl: 3  •  verapamiL 40 mg tablet, TAKE ONE TABLET BY MOUTH ONCE DAILY WHEN NEEDED, Disp: 90 tablet, Rfl: 3  •  verapamil  mg CR tablet, Take 1 tablet (240 mg total) by mouth once daily., Disp: 90 tablet, Rfl: 1      BP Readings from Last 3 Encounters:   10/21/21 120/70   04/21/21 102/64   04/21/21 102/64       Recent Lab results:  Lab Results   Component Value Date    CHOL 227 (H) 10/17/2019   ,   Lab Results   Component Value Date    HDL 75 10/17/2019   ,   Lab Results   Component Value Date    LDLCALC 138 (H) 10/17/2019   ,   Lab Results   Component Value Date    TRIG 70 10/17/2019        Lab Results   Component Value Date    GLUCOSE 85 10/17/2019   , No results found for: HGBA1C      Lab Results   Component Value Date    CREATININE 0.8 10/17/2019       Lab Results   Component Value Date    TSH 3.280 10/28/2019

## 2022-02-22 NOTE — TELEPHONE ENCOUNTER
As we discussed at her visit, I do not prescribe clonazepam for chronic use. If her cardiologist is recommending that she take it then she needs to follow up with them. Otherwise I can refer her to a mental health provider.

## 2022-02-22 NOTE — TELEPHONE ENCOUNTER
Computer says she has a drug allergy from 2010 to procaine, which is a related medicatin. Does she remember what the allergy was? Nothing specified. If she doesn't remember then we can try codeine for the cough instead

## 2022-02-22 NOTE — TELEPHONE ENCOUNTER
Spoke with pt.  Has appt with Dr Muse April 14 and can discuss long term klonopin use indication then.  I will send in script for 0.5mg #30 which will last 60 days cut in half, she is welcome to divide further into 1/4 dose.   For chronic cough try stronger antihistamine at night in addition to claritin in the morning, continue singulair.  Stop mucinex as it is probably making more mucus

## 2022-02-22 NOTE — TELEPHONE ENCOUNTER
Left pt a detailed vm asking for her to give the office a call back to clarify if she is allergic to the procaine and what kind of reaction she specifically had. Also informed pt via vm that Dr. West will not be refilling the clonazepam as she discussed with pt at last office visit she carrillo snot prescribe this medication for chronic use.

## 2022-03-02 ENCOUNTER — TELEPHONE (OUTPATIENT)
Dept: SCHEDULING | Facility: CLINIC | Age: 72
End: 2022-03-02
Payer: MEDICARE

## 2022-03-02 NOTE — TELEPHONE ENCOUNTER
Pt had to cancel appt for 3/9/22 and rescheduled to next available which was 6/1/22.    Pt requesting to be contacted if sooner appt becomes available    Pt can be reached at [phone]

## 2022-05-02 ENCOUNTER — TELEPHONE (OUTPATIENT)
Dept: SCHEDULING | Facility: CLINIC | Age: 72
End: 2022-05-02
Payer: MEDICARE

## 2022-05-02 NOTE — TELEPHONE ENCOUNTER
Cardiac Clearance     Name of caller: Skye-     Relationship to patient: [facility]       Name of patient: Kelly Meeks    Name of physician: Ofelia Muse MD    Date of Surgery: 06/16/2022    Type of Surgery: Cataract surgery- left eye     Name of surgeon: Dr. Davi Seals     Office contact number: [phone]    Office fax number: [phone]    Addendums    Pt has appt.scheduled for 5/26 req. To have EKG and CCV during visit

## 2022-05-04 ENCOUNTER — TELEPHONE (OUTPATIENT)
Dept: FAMILY MEDICINE | Facility: CLINIC | Age: 72
End: 2022-05-04
Payer: MEDICARE

## 2022-05-04 NOTE — TELEPHONE ENCOUNTER
Pt stated she was tested positive Friday with a home test. Pt has runny nose, body aches, coughing up mucus and feeling very tired. Pt also has asthma and breathing test was 300. Pt also stated she retest herself after 5 days and still Covid positive. Please advise

## 2022-05-04 NOTE — TELEPHONE ENCOUNTER
Spoke with pt she stated she did not want to take the Paxlovid. Her symptoms below are not unmanageable. Pt is specifically asking for medication to get rid of the mucus. Pt is aware of your instructions to urgent care if symptoms are not manageable.

## 2022-05-04 NOTE — TELEPHONE ENCOUNTER
paxlovid is only effective if given within the first 5 days of symptoms so will not be effective now.   If she has fever over 101 still, wheezing, SOB or coughign she can't manage then she should come in to urgent care to be seen. Fatigue is very common and can last up to two weeks. She should continue to isolate for 10 days even if she is feeling better

## 2022-05-05 NOTE — TELEPHONE ENCOUNTER
Called pt, left message wwe will try again.  
Leana   
Printed and mailed  
Pt called to speak with Dr. Saucedo regarding a personal matter. Pt didn't disclose what is was about.     Pt can be reached at [phone]    
Pt calling back to speak with Dr. Acosta pt  Personal.P# [phone]  
Spoke with pt, lab orders in, please mail to pt.  
Detail Level: Zone
Quality 265: Biopsy Follow-Up: Biopsy results reviewed, communicated, tracked, and documented

## 2022-05-25 ENCOUNTER — TELEPHONE (OUTPATIENT)
Dept: SCHEDULING | Facility: CLINIC | Age: 72
End: 2022-05-25
Payer: MEDICARE

## 2022-05-25 DIAGNOSIS — E03.9 ACQUIRED HYPOTHYROIDISM: Primary | ICD-10-CM

## 2022-05-25 DIAGNOSIS — E78.5 DYSLIPIDEMIA: ICD-10-CM

## 2022-05-25 DIAGNOSIS — E55.9 VITAMIN D DEFICIENCY: ICD-10-CM

## 2022-05-25 DIAGNOSIS — E78.41 ELEVATED LP(A): ICD-10-CM

## 2022-05-25 NOTE — TELEPHONE ENCOUNTER
Pt has appnt on 6/1 and is asking for lab order to be generated on a [facility] order form.      Please call her at [phone] to advise when completed.

## 2022-05-26 ENCOUNTER — OFFICE VISIT (OUTPATIENT)
Dept: CARDIOLOGY | Facility: CLINIC | Age: 72
End: 2022-05-26
Payer: MEDICARE

## 2022-05-26 VITALS
DIASTOLIC BLOOD PRESSURE: 76 MMHG | SYSTOLIC BLOOD PRESSURE: 122 MMHG | HEART RATE: 64 BPM | RESPIRATION RATE: 16 BRPM | BODY MASS INDEX: 25.97 KG/M2 | HEIGHT: 62 IN

## 2022-05-26 DIAGNOSIS — E03.9 ACQUIRED HYPOTHYROIDISM: Primary | ICD-10-CM

## 2022-05-26 DIAGNOSIS — E55.9 VITAMIN D DEFICIENCY: ICD-10-CM

## 2022-05-26 DIAGNOSIS — I47.10 SUPRAVENTRICULAR TACHYCARDIA (CMS/HCC): Primary | ICD-10-CM

## 2022-05-26 DIAGNOSIS — E78.5 DYSLIPIDEMIA: ICD-10-CM

## 2022-05-26 PROCEDURE — 99214 OFFICE O/P EST MOD 30 MIN: CPT | Performed by: INTERNAL MEDICINE

## 2022-05-26 PROCEDURE — 93000 ELECTROCARDIOGRAM COMPLETE: CPT | Performed by: INTERNAL MEDICINE

## 2022-05-26 NOTE — ASSESSMENT & PLAN NOTE
She is on Crestor and Zetia. She is getting labs drawn tomorrow and is scheduled to see Dr. Saucedo next month.

## 2022-05-26 NOTE — ASSESSMENT & PLAN NOTE
No recurrent episodes. Continue Verapamil.     She has recurrence of supraventricular tachycardia she is an excellent candidate to undergo catheter mediated ablation procedure.   She also has a short acting verapamil if needed if she has recurrence of the arrhythmia

## 2022-05-26 NOTE — LETTER
May 26, 2022     Massiel West MD  [address] [place] [address]  [facility] [address]    Patient: Kelly Meeks  YOB: 1950  Date of Visit: 5/26/2022      Dear Dr. West:    Thank you for referring Kelly Meeks to me for evaluation. Below are my notes for this consultation.    If you have questions, please do not hesitate to call me. I look forward to following your patient along with you.         Sincerely,        Ofelia Muse MD        CC: Ofleia Prado MD  6/12/2022 11:43 AM  Signed   Ofelia Muse MD, [facility]  Cardiac Electrophysiology    [facility]  [facility]  [facility]  [address] [place] [address]    TEL  [phone]  [facility]       Electrophysiology Office Visit  5/26/2022      Kelly Meeks is a 71 y.o. female who presents to the office today with a history of supraventricular tachycardia. The patient also has a past medical history of dyslipidemia. The patient had an ultrasound of the carotid bilateral on 12/31/2019, which revealed mild plaque. In addition, completed a CT coronary calcium test on 12/20/2019, which revealed a score of 0.    Today, she reports that she is feeling well. She had no recurrent episodes of SVT. She continues to take Verapamil 240mg daily for her arrhythmia. She denies chest pain, dizziness, shortness of breath, palpitations, or syncope. Patient is scheduled for cataract procedure on 06/16/22. She is having lab tests done tomorrow, ordered by Dr. Saucedo.    Patient Active Problem List   Diagnosis   • Asthma   • Supraventricular tachycardia (CMS/HCC)   • Hypothyroidism   • Murmur   • Esophageal reflux   • Elevated Lp(a)   • Arteriosclerosis of both carotid arteries   • Raynaud's disease without gangrene   • Insulin resistance   • Homocystinuria (CMS/HCC)   • Dyslipidemia     Past Medical History:   Diagnosis Date   • Anxiety    • Asthma    •  Coronary artery disease    • Hyperlipidemia    • Hypothyroidism      Past Surgical History:   Procedure Laterality Date   • ADENOIDECTOMY     • CARDIAC CATHETERIZATION  1976   • TONSILLECTOMY       Allergies: Epinephrine, Procaine, and Caffeine    Current Outpatient Medications   Medication Sig Dispense Refill   • albuterol 2.5 mg /3 mL (0.083 %) nebulizer solution inhale 3 milliliter by nebulization route 3 times every day     • albuterol HFA 90 mcg/actuation inhaler INHALE TWO (2) PUFFS BY MOUTH EVERY SIX (6) HOURS 8.5 each 3   • cetirizine (ZyrTEC) 10 mg tablet Take 1 tablet (10 mg total) by mouth nightly. (Patient taking differently: Take 10 mg by mouth nightly as needed.) 90 tablet 1   • cholecalciferol, vitamin D3, (VITAMIN D3) 2,000 unit tablet 2 (two) times a day.       • DOCOSAHEXANOIC ACID/EPA (FISH OIL ORAL) take  atotal 3,000  daily     • montelukast (SINGULAIR) 10 mg tablet TAKE ONE (1) TABLET (10 MG TOTAL) BY MOUTH NIGHTLY.  90 tablet 3   • niacin (ENDUR-ACIN) 500 mg CR tablet Take 2 tablets (1,000 mg total) by mouth nightly. 180 tablet 3   • turmeric-turmeric root extract 450-50 mg capsule Take by mouth 2 (two) times a day.     • verapamil (VERELAN) 240 mg 24 hr capsule Take 1 capsule (240 mg total) by mouth nightly. 90 capsule 3   • verapamiL 40 mg tablet TAKE ONE TABLET BY MOUTH ONCE DAILY WHEN NEEDED 90 tablet 3   • verapamil  mg CR tablet Take 1 tablet (240 mg total) by mouth once daily. 90 tablet 1   • ezetimibe (ZETIA) 10 mg tablet Take 1 tablet (10 mg total) by mouth 3 (three) times a week (Tue, Thu, Sat). 39 tablet 3   • famotidine (PEPCID) 20 mg tablet TAKE 1 TABLET (20 MG TOTAL) BY MOUTH ONCE DAILY. 90 tablet 3   • levothyroxine (SYNTHROID) 100 mcg tablet TAKE ONE (1) TABLET (100 MCG TOTAL) BY MOUTH ONCE DAILY. 90 tablet 3   • rosuvastatin (CRESTOR) 5 mg tablet Take 1 tablet (5 mg total) by mouth 3 (three) times a week (Mon, Wed, Fri). 39 tablet 3     No current facility-administered  "medications for this visit.          Review of Systems:    As in HPI.  All other other systems were reviewed and are negative.  .     Objective     Vitals:    05/26/22 1523   BP: 122/76   BP Location: Left upper arm   Patient Position: Sitting   Pulse: 64   Resp: 16   Height: 1.575 m (5' 2\")     Physical Exam : Pleasant, in no acute distress.    Head:                    Atraumatic. Normocephalic  Eyes:                     No scleral icterus. Normal EOMs  Neck:                     No thyromegaly present. Normal ROM  Vascular:              No JVD. No carotid bruits  Cardiovascular:   Normal S1/S2, Regular rhythm, no murmur heard.  Pulmonary:           Breath sounds normal. No  Rales, wheezes or rhonchi bilateral.  Abdominal:           Soft. There is no tenderness. Bowel sounds present. No masses.  Musculoskeletal: There are no deformities.   Extremities:          No edema or cyanosis.  Neurological:        Alert, oriented x3. No focal deficits to gross exam.  Skin:                      No rash noted.   Psychiatric:           Normal mood and affect       ECG 5/26/2022:  I personally reviewed the 12 lead EKG obtained in the office today which reveals Sinus rhythm at 69 bpm. Normal EKG.      CT CALCIUM SCORE: 06/03/2019  CORONARY CALCIUM COMPOSITE AGATSTON SCORE: 0    Cardiac Imaging    ECHOCARDIOGRAM STRESS TEST 04/21/2021    Interpretation Summary  · An exercise stress test was performed following the Richard protocol. The patient reported no symptoms and no angina during the stress test.  · There was no ST segment deviation noted during stress. Arrhythmias noted during stress: rare PVCs .There were no arrhythmias during recovery.  · Stress ECG does not meet criteria for ischemia.  · Post-stress echocardiogram does not meet criteria for ischemia. All ventricular walls become hyperdynamic and the ejection fraction improves.  · Normal ventricle size. Normal wall thickness. Preserved systolic function. Estimated EF 65%. " Normal septal motion. No regional wall motion abnormalities. Grade I diastolic dysfunction. Diastolic inflow pattern consistent with impaired relaxation.  · Tricuspid aortic valve. Calcification of the aortic valve leaflets. No evidence of aortic valve regurgitation. . Mild aortic valve stenosis. AV mean gradient = 12.00 mmHg.  · Stress echocardiogram does not meet criteria for myocardial ischemia.      ASSESSMENT/PLAN:  Supraventricular tachycardia (CMS/HCC) (HCC)  No recurrent episodes. Continue Verapamil.     She has recurrence of supraventricular tachycardia she is an excellent candidate to undergo catheter mediated ablation procedure.   She also has a short acting verapamil if needed if she has recurrence of the arrhythmia    Dyslipidemia  She is on Crestor and Zetia. She is getting labs drawn tomorrow and is scheduled to see Dr. Saucedo next month.    Thank you for allowing me to participate in the care of your patient.  Please do not hesitate to contact me if you have any questions regarding my recommendations and management.    Sincerely;    Ofelia Guerra MD [facility]    Bertin TIRADO PA-C, am scribing for, and in the presence of, Ofelia Guerra MD.    Ofelia TIRADO MD, personally performed the services described in this documentation as described by Bertin Arauz in my presence, and it is both accurate and complete. I have seen and examined the patient.  I have reviewed the PA note and supporting documentation. The note has been amended as appropriate.

## 2022-05-26 NOTE — PROGRESS NOTES
Ofelia Muse MD, [facility]  Cardiac Electrophysiology    [facility]    [facility]  [facility] [address] [facility]  [address] [place] [address]    TEL  [phone]  [facility]       Electrophysiology Office Visit  5/26/2022      Kelly Meeks is a 71 y.o. female who presents to the office today with a history of supraventricular tachycardia. The patient also has a past medical history of dyslipidemia. The patient had an ultrasound of the carotid bilateral on 12/31/2019, which revealed mild plaque. In addition, completed a CT coronary calcium test on 12/20/2019, which revealed a score of 0.    Today, she reports that she is feeling well. She had no recurrent episodes of SVT. She continues to take Verapamil 240mg daily for her arrhythmia. She denies chest pain, dizziness, shortness of breath, palpitations, or syncope. Patient is scheduled for cataract procedure on 06/16/22. She is having lab tests done tomorrow, ordered by Dr. Saucedo.    Patient Active Problem List   Diagnosis   • Asthma   • Supraventricular tachycardia (CMS/HCC)   • Hypothyroidism   • Murmur   • Esophageal reflux   • Elevated Lp(a)   • Arteriosclerosis of both carotid arteries   • Raynaud's disease without gangrene   • Insulin resistance   • Homocystinuria (CMS/HCC)   • Dyslipidemia     Past Medical History:   Diagnosis Date   • Anxiety    • Asthma    • Coronary artery disease    • Hyperlipidemia    • Hypothyroidism      Past Surgical History:   Procedure Laterality Date   • ADENOIDECTOMY     • CARDIAC CATHETERIZATION  1976   • TONSILLECTOMY       Allergies: Epinephrine, Procaine, and Caffeine    Current Outpatient Medications   Medication Sig Dispense Refill   • albuterol 2.5 mg /3 mL (0.083 %) nebulizer solution inhale 3 milliliter by nebulization route 3 times every day     • albuterol HFA 90 mcg/actuation inhaler INHALE TWO (2) PUFFS BY MOUTH EVERY SIX (6) HOURS 8.5 each 3   •  "cetirizine (ZyrTEC) 10 mg tablet Take 1 tablet (10 mg total) by mouth nightly. (Patient taking differently: Take 10 mg by mouth nightly as needed.) 90 tablet 1   • cholecalciferol, vitamin D3, (VITAMIN D3) 2,000 unit tablet 2 (two) times a day.       • DOCOSAHEXANOIC ACID/EPA (FISH OIL ORAL) take  atotal 3,000  daily     • montelukast (SINGULAIR) 10 mg tablet TAKE ONE (1) TABLET (10 MG TOTAL) BY MOUTH NIGHTLY.  90 tablet 3   • niacin (ENDUR-ACIN) 500 mg CR tablet Take 2 tablets (1,000 mg total) by mouth nightly. 180 tablet 3   • turmeric-turmeric root extract 450-50 mg capsule Take by mouth 2 (two) times a day.     • verapamil (VERELAN) 240 mg 24 hr capsule Take 1 capsule (240 mg total) by mouth nightly. 90 capsule 3   • verapamiL 40 mg tablet TAKE ONE TABLET BY MOUTH ONCE DAILY WHEN NEEDED 90 tablet 3   • verapamil  mg CR tablet Take 1 tablet (240 mg total) by mouth once daily. 90 tablet 1   • ezetimibe (ZETIA) 10 mg tablet Take 1 tablet (10 mg total) by mouth 3 (three) times a week (Tue, Thu, Sat). 39 tablet 3   • famotidine (PEPCID) 20 mg tablet TAKE 1 TABLET (20 MG TOTAL) BY MOUTH ONCE DAILY. 90 tablet 3   • levothyroxine (SYNTHROID) 100 mcg tablet TAKE ONE (1) TABLET (100 MCG TOTAL) BY MOUTH ONCE DAILY. 90 tablet 3   • rosuvastatin (CRESTOR) 5 mg tablet Take 1 tablet (5 mg total) by mouth 3 (three) times a week (Mon, Wed, Fri). 39 tablet 3     No current facility-administered medications for this visit.          Review of Systems:    As in HPI.  All other other systems were reviewed and are negative.  .     Objective     Vitals:    05/26/22 1523   BP: 122/76   BP Location: Left upper arm   Patient Position: Sitting   Pulse: 64   Resp: 16   Height: 1.575 m (5' 2\")     Physical Exam : Pleasant, in no acute distress.    Head:                    Atraumatic. Normocephalic  Eyes:                     No scleral icterus. Normal EOMs  Neck:                     No thyromegaly present. Normal ROM  Vascular:          "     No JVD. No carotid bruits  Cardiovascular:   Normal S1/S2, Regular rhythm, no murmur heard.  Pulmonary:           Breath sounds normal. No  Rales, wheezes or rhonchi bilateral.  Abdominal:           Soft. There is no tenderness. Bowel sounds present. No masses.  Musculoskeletal: There are no deformities.   Extremities:          No edema or cyanosis.  Neurological:        Alert, oriented x3. No focal deficits to gross exam.  Skin:                      No rash noted.   Psychiatric:           Normal mood and affect       ECG 5/26/2022:  I personally reviewed the 12 lead EKG obtained in the office today which reveals Sinus rhythm at 69 bpm. Normal EKG.      CT CALCIUM SCORE: 06/03/2019  CORONARY CALCIUM COMPOSITE AGATSTON SCORE: 0    Cardiac Imaging    ECHOCARDIOGRAM STRESS TEST 04/21/2021    Interpretation Summary  · An exercise stress test was performed following the Richard protocol. The patient reported no symptoms and no angina during the stress test.  · There was no ST segment deviation noted during stress. Arrhythmias noted during stress: rare PVCs .There were no arrhythmias during recovery.  · Stress ECG does not meet criteria for ischemia.  · Post-stress echocardiogram does not meet criteria for ischemia. All ventricular walls become hyperdynamic and the ejection fraction improves.  · Normal ventricle size. Normal wall thickness. Preserved systolic function. Estimated EF 65%. Normal septal motion. No regional wall motion abnormalities. Grade I diastolic dysfunction. Diastolic inflow pattern consistent with impaired relaxation.  · Tricuspid aortic valve. Calcification of the aortic valve leaflets. No evidence of aortic valve regurgitation. . Mild aortic valve stenosis. AV mean gradient = 12.00 mmHg.  · Stress echocardiogram does not meet criteria for myocardial ischemia.      ASSESSMENT/PLAN:  Supraventricular tachycardia (CMS/HCC) (HCC)  No recurrent episodes. Continue Verapamil.     She has recurrence of  supraventricular tachycardia she is an excellent candidate to undergo catheter mediated ablation procedure.   She also has a short acting verapamil if needed if she has recurrence of the arrhythmia    Dyslipidemia  She is on Crestor and Zetia. She is getting labs drawn tomorrow and is scheduled to see Dr. Saucedo next month.    Thank you for allowing me to participate in the care of your patient.  Please do not hesitate to contact me if you have any questions regarding my recommendations and management.    Sincerely;    Ofelia Guerra MD [facility]    Bertin TIRADO PA-C, am scribing for, and in the presence of, Ofelia Guerra MD.    Ofelia TIRADO MD, personally performed the services described in this documentation as described by Bertin Arauz in my presence, and it is both accurate and complete. I have seen and examined the patient.  I have reviewed the PA note and supporting documentation. The note has been amended as appropriate.

## 2022-05-27 ENCOUNTER — APPOINTMENT (OUTPATIENT)
Dept: LAB | Facility: HOSPITAL | Age: 72
End: 2022-05-27
Attending: NURSE PRACTITIONER
Payer: MEDICARE

## 2022-05-27 ENCOUNTER — TELEPHONE (OUTPATIENT)
Dept: FAMILY MEDICINE | Facility: CLINIC | Age: 72
End: 2022-05-27

## 2022-05-27 DIAGNOSIS — E78.5 DYSLIPIDEMIA: ICD-10-CM

## 2022-05-27 DIAGNOSIS — E55.9 VITAMIN D DEFICIENCY: ICD-10-CM

## 2022-05-27 DIAGNOSIS — E03.9 ACQUIRED HYPOTHYROIDISM: ICD-10-CM

## 2022-05-27 DIAGNOSIS — E78.41 ELEVATED LP(A): ICD-10-CM

## 2022-05-27 LAB
25(OH)D3 SERPL-MCNC: 43 NG/ML (ref 30–100)
ALBUMIN SERPL-MCNC: 3.6 G/DL (ref 3.4–5)
ALP SERPL-CCNC: 57 IU/L (ref 35–126)
ALT SERPL-CCNC: 27 IU/L (ref 11–54)
ANION GAP SERPL CALC-SCNC: 10 MEQ/L (ref 3–15)
AST SERPL-CCNC: 28 IU/L (ref 15–41)
BASOPHILS # BLD: 0.04 K/UL (ref 0.01–0.1)
BASOPHILS NFR BLD: 1 %
BILIRUB SERPL-MCNC: 0.7 MG/DL (ref 0.3–1.2)
BUN SERPL-MCNC: 14 MG/DL (ref 8–20)
CALCIUM SERPL-MCNC: 9 MG/DL (ref 8.9–10.3)
CHLORIDE SERPL-SCNC: 104 MEQ/L (ref 98–109)
CHOLEST SERPL-MCNC: 185 MG/DL
CO2 SERPL-SCNC: 25 MEQ/L (ref 22–32)
CREAT SERPL-MCNC: 0.8 MG/DL (ref 0.6–1.1)
DIFFERENTIAL METHOD BLD: ABNORMAL
EOSINOPHIL # BLD: 0.18 K/UL (ref 0.04–0.36)
EOSINOPHIL NFR BLD: 4.5 %
ERYTHROCYTE [DISTWIDTH] IN BLOOD BY AUTOMATED COUNT: 13.9 % (ref 11.7–14.4)
GFR SERPL CREATININE-BSD FRML MDRD: >60 ML/MIN/1.73M*2
GLUCOSE SERPL-MCNC: 90 MG/DL (ref 70–99)
HCT VFR BLDCO AUTO: 41.7 % (ref 35–45)
HDLC SERPL-MCNC: 79 MG/DL
HDLC SERPL: 2.3 {RATIO}
HGB BLD-MCNC: 13.9 G/DL (ref 11.8–15.7)
IMM GRANULOCYTES # BLD AUTO: 0 K/UL (ref 0–0.08)
IMM GRANULOCYTES NFR BLD AUTO: 0 %
LDLC SERPL CALC-MCNC: 98 MG/DL
LYMPHOCYTES # BLD: 1.01 K/UL (ref 1.2–3.5)
LYMPHOCYTES NFR BLD: 25.5 %
MCH RBC QN AUTO: 31.1 PG (ref 28–33.2)
MCHC RBC AUTO-ENTMCNC: 33.3 G/DL (ref 32.2–35.5)
MCV RBC AUTO: 93.3 FL (ref 83–98)
MONOCYTES # BLD: 0.48 K/UL (ref 0.28–0.8)
MONOCYTES NFR BLD: 12.1 %
NEUTROPHILS # BLD: 2.25 K/UL (ref 1.7–7)
NEUTS SEG NFR BLD: 56.9 %
NONHDLC SERPL-MCNC: 106 MG/DL
NRBC BLD-RTO: 0 %
PDW BLD AUTO: 9.9 FL (ref 9.4–12.3)
PLAT MORPH BLD: NORMAL
PLATELET # BLD AUTO: 255 K/UL (ref 150–369)
PLATELET # BLD EST: ABNORMAL 10*3/UL
POTASSIUM SERPL-SCNC: 4.3 MEQ/L (ref 3.6–5.1)
PROT SERPL-MCNC: 6 G/DL (ref 6–8.2)
RBC # BLD AUTO: 4.47 M/UL (ref 3.93–5.22)
RBC MORPH BLD: NORMAL
SODIUM SERPL-SCNC: 139 MEQ/L (ref 136–144)
TRIGL SERPL-MCNC: 41 MG/DL (ref 30–149)
TSH SERPL DL<=0.05 MIU/L-ACNC: 4.46 MIU/L (ref 0.34–5.6)
WBC # BLD AUTO: 3.96 K/UL (ref 3.8–10.5)

## 2022-05-27 PROCEDURE — 84443 ASSAY THYROID STIM HORMONE: CPT

## 2022-05-27 PROCEDURE — 85025 COMPLETE CBC W/AUTO DIFF WBC: CPT

## 2022-05-27 PROCEDURE — 36415 COLL VENOUS BLD VENIPUNCTURE: CPT

## 2022-05-27 PROCEDURE — 82306 VITAMIN D 25 HYDROXY: CPT

## 2022-05-27 PROCEDURE — 80053 COMPREHEN METABOLIC PANEL: CPT

## 2022-05-27 PROCEDURE — 80061 LIPID PANEL: CPT

## 2022-05-27 RX ORDER — LEVOTHYROXINE SODIUM 100 UG/1
TABLET ORAL
Qty: 90 TABLET | Refills: 3 | Status: SHIPPED | OUTPATIENT
Start: 2022-05-27 | End: 2023-02-27

## 2022-05-27 RX ORDER — FAMOTIDINE 20 MG/1
TABLET, FILM COATED ORAL
Qty: 90 TABLET | Refills: 3 | Status: SHIPPED | OUTPATIENT
Start: 2022-05-27 | End: 2023-06-13

## 2022-05-27 NOTE — TELEPHONE ENCOUNTER
Do you have enough medication for the next 5 days?:     Did you request this medication through your pharmacy or our patient portal in the last day or two?     Name of medication requested: \levothyroxine   Medication Strength:   Mediation Directions:   Quantity Requested (example 30/90):   Number of refills requested:       System Generated Preferred Pharmacy(s)  are as follows.  If more than one pharmacy displays please identify which one should be used for this call    Has the pharmacy information below been confirmed with the patient?         [facility] [place] [address]  [address] [place] [address]  Phone: [phone] Fax: [phone]    [facility]pharm[facility] #[id number] - [place] - [address] AT [facility]  [address]  [place] [address]  Phone: [phone] Fax: [phone]          If necessary, provide pharmacy details below. [facility]     Is this pharmacy a mail order pharmacy?:   Pharmacy Name:   Pharmacy City:   Pharmacy Telephone:     Additional Comments:    Next Encounter with this provider: Visit date not found

## 2022-05-27 NOTE — TELEPHONE ENCOUNTER
Medicine Refill Request    Last Office: 10/21/2021   Last Consult Visit: Visit date not found  Last Telemedicine Visit: 4/15/2021 Melonie Guy MD    Next Appointment: Visit date not found      Current Outpatient Medications:   •  albuterol 2.5 mg /3 mL (0.083 %) nebulizer solution, inhale 3 milliliter by nebulization route 3 times every day, Disp: , Rfl:   •  albuterol HFA 90 mcg/actuation inhaler, INHALE TWO (2) PUFFS BY MOUTH EVERY SIX (6) HOURS, Disp: 8.5 each, Rfl: 3  •  cetirizine (ZyrTEC) 10 mg tablet, Take 1 tablet (10 mg total) by mouth nightly. (Patient taking differently: Take 10 mg by mouth nightly as needed.), Disp: 90 tablet, Rfl: 1  •  cholecalciferol, vitamin D3, (VITAMIN D3) 2,000 unit tablet, 2 (two) times a day.  , Disp: , Rfl:   •  DOCOSAHEXANOIC ACID/EPA (FISH OIL ORAL), take  atotal 3,000  daily, Disp: , Rfl:   •  estradioL (ESTRACE) 0.01 % (0.1 mg/gram) vaginal cream, Insert 2 g into the vagina nightly., Disp: 42.5 g, Rfl: 3  •  ezetimibe (ZETIA) 10 mg tablet, Take 1 tablet (10 mg total) by mouth 3 (three) times a week (Tue, Thu, Sat)., Disp: 39 tablet, Rfl: 3  •  famotidine (PEPCID) 20 mg tablet, TAKE 1 TABLET (20 MG TOTAL) BY MOUTH ONCE DAILY., Disp: 90 tablet, Rfl: 0  •  levothyroxine (SYNTHROID) 100 mcg tablet, TAKE ONE (1) TABLET (100 MCG TOTAL) BY MOUTH ONCE DAILY., Disp: 90 tablet, Rfl: 0  •  melatonin 5 mg tablet, Take by mouth nightly.  , Disp: , Rfl:   •  montelukast (SINGULAIR) 10 mg tablet, TAKE ONE (1) TABLET (10 MG TOTAL) BY MOUTH NIGHTLY. , Disp: 90 tablet, Rfl: 3  •  niacin (ENDUR-ACIN) 500 mg CR tablet, Take 2 tablets (1,000 mg total) by mouth nightly., Disp: 180 tablet, Rfl: 3  •  rosuvastatin (CRESTOR) 5 mg tablet, Take 1 tablet (5 mg total) by mouth 3 (three) times a week (Mon, Wed, Fri)., Disp: 39 tablet, Rfl: 3  •  turmeric-turmeric root extract 450-50 mg capsule, Take by mouth 2 (two) times a day., Disp: , Rfl:   •  verapamil (VERELAN) 240 mg 24 hr capsule, Take 1  capsule (240 mg total) by mouth nightly., Disp: 90 capsule, Rfl: 3  •  verapamiL 40 mg tablet, TAKE ONE TABLET BY MOUTH ONCE DAILY WHEN NEEDED, Disp: 90 tablet, Rfl: 3  •  verapamil  mg CR tablet, Take 1 tablet (240 mg total) by mouth once daily., Disp: 90 tablet, Rfl: 1      BP Readings from Last 3 Encounters:   05/26/22 122/76   10/21/21 120/70   04/21/21 102/64       Recent Lab results:  Lab Results   Component Value Date    CHOL 185 05/27/2022   ,   Lab Results   Component Value Date    HDL 79 05/27/2022   ,   Lab Results   Component Value Date    LDLCALC 98 05/27/2022   ,   Lab Results   Component Value Date    TRIG 41 05/27/2022        Lab Results   Component Value Date    GLUCOSE 90 05/27/2022   , No results found for: HGBA1C      Lab Results   Component Value Date    CREATININE 0.8 05/27/2022       Lab Results   Component Value Date    TSH 4.46 05/27/2022

## 2022-05-27 NOTE — TELEPHONE ENCOUNTER
Pt requesting refills on levothyroxine (SYNTHROID) 100 mcg tablet  be sent to pharmacy on file.Please advise

## 2022-06-01 ENCOUNTER — OFFICE VISIT (OUTPATIENT)
Dept: CARDIOLOGY | Facility: CLINIC | Age: 72
End: 2022-06-01
Payer: MEDICARE

## 2022-06-01 VITALS
HEART RATE: 70 BPM | RESPIRATION RATE: 18 BRPM | WEIGHT: 142 LBS | HEIGHT: 62 IN | BODY MASS INDEX: 26.13 KG/M2 | DIASTOLIC BLOOD PRESSURE: 80 MMHG | SYSTOLIC BLOOD PRESSURE: 114 MMHG | OXYGEN SATURATION: 97 %

## 2022-06-01 DIAGNOSIS — E78.41 ELEVATED LP(A): ICD-10-CM

## 2022-06-01 DIAGNOSIS — I47.10 SUPRAVENTRICULAR TACHYCARDIA (CMS/HCC): Primary | ICD-10-CM

## 2022-06-01 DIAGNOSIS — I65.23 ARTERIOSCLEROSIS OF BOTH CAROTID ARTERIES: ICD-10-CM

## 2022-06-01 DIAGNOSIS — E78.5 DYSLIPIDEMIA: ICD-10-CM

## 2022-06-01 PROCEDURE — 99214 OFFICE O/P EST MOD 30 MIN: CPT | Performed by: INTERNAL MEDICINE

## 2022-06-01 RX ORDER — ROSUVASTATIN CALCIUM 5 MG/1
5 TABLET, COATED ORAL 3 TIMES WEEKLY
Qty: 39 TABLET | Refills: 3 | Status: SHIPPED | OUTPATIENT
Start: 2022-06-01 | End: 2023-06-28 | Stop reason: SDUPTHER

## 2022-06-01 RX ORDER — EZETIMIBE 10 MG/1
10 TABLET ORAL 3 TIMES WEEKLY
Qty: 39 TABLET | Refills: 3 | Status: SHIPPED | OUTPATIENT
Start: 2022-06-02 | End: 2023-06-13

## 2022-06-01 NOTE — LETTER
2022     Massiel West MD  [address]  [address]  [facility] [place] [address]    Patient: Kelly Meeks  YOB: 1950  Date of Visit: 2022      Dear Dr. West:    Thank you for referring Kelly Meeks to me for evaluation. Below are my notes for this consultation.    If you have questions, please do not hesitate to call me. I look forward to following your patient along with you.         Sincerely,        Nilson Saucedo MD        CC: No Recipients  Nilson Saucedo MD  2022  1:47 PM  Sign when Signing Visit  [facility]    2022    Melonie Guy M.D.  [address] [facility]  [address]  [place] [address]    Re:  KELLY MEEKS  :  1950    Dear Melonie:    It was my pleasure to see your patient, Kelly Meeks, in the [facility] today.  As you know, we follow her for polygenic hypercholesterolemia, elevated LP(a), statin intolerance and mild aortic valvular sclerosis.    Clinically, she has been doing well.  She denies any chest pain, shortness of breath or palpitations.    As you know, she has undergone previous cardiac workup.  Her coronary calcium score was 0 in May 2016.  She did have a moderate amount of calcification involving in the thoracic aorta as well as the carotid distribution.  Given her family history of a father who had a myocardial infarction in his 30's and a mother who had a stroke at age 72, we elected to begin primary prevention therapy.    Her initial lipid panel revealed a total cholesterol of 228, , HDL 62 and triglycerides 102.  She initiated on combination therapy with atorvastatin 10 mg alternating with Zetia 10 mg.  Her total cholesterol is down to 208, , apoB 99, LDL-P 1197, a reasonable response to this therapy.  Of note, her LP(a) was elevated at 420.  This prompted a discussion and we will begin niacin with Endur-Acin 500 mg at the evening meal.    Over the past year the patient has been on the keto diet.   She is markedly increased her saturated fats.  Her repeat lipid panel reflects this increased saturated fat intake.  Total cholesterol 218  HDL 73 triglycerides 73 APO B 121 and LDL P 1568.  We had a lengthy discussion  concerning cutting back on her saturated fat intake.  She needs to follow a low carbohydrate low saturated fat Mediterranean diet and increase her exercise program.      We recommended that she undergo a repeat coronary calcium score.  Her repeat calcium score in April 2019 was 0.  She remains in a very low low risk quartile.  We discussed her prevention program in detail.  Her repeat lipid panel revealed a total cholesterol 227  HDL 75 and triglycerides 70.  We both agreed to switch her from Lipitor to Crestor at 5 mg Monday Wednesday Friday.  We also agreed to increase her Endur-Acin 1000 mg the evening meal.      Today we discussed her most recent lipid panel from May 2022.  Her total cholesterol was 185 LDL 98 HDL 79 and triglycerides 41.  Certainly an excellent response to the use of Crestor 5 mg alternating with Zetia 10 mg and Endur-Acin 1000 mg.    She does carry history of elevated LP(a) at 420 nmol/L.  Eventually she will be a candidate for the new injectable medication for LP(a) once it is available in the clinic.  She has mild aortic valvular stenosis.    She previously she was complaining of chest pain localized to the right upper chest. This is atypical for CAD however we will schedule her for a stress echo.  Her stress echo was performed  was negative for myocardial ischemia or scar.  Her echo demonstrated that she has mild aortic valvular stenosis.      PAST MEDICAL HISTORY:    Coronary calcium score 0, May 2016.  2019.  Stress echo in 2021 was a normal study.    Thoracic aortic calcification  Carotid arterial calcification  Aortic valvular sclerosis minimal stenosis.  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, Vitamin-D deficiency  Elevated  LP(a)  History of PSVT  Past Medical History:   Diagnosis Date   • Anxiety    • Asthma    • Coronary artery disease    • Hyperlipidemia    • Hypothyroidism      PAST SURGICAL HISTORY:    Past Surgical History:   Procedure Laterality Date   • ADENOIDECTOMY     • CARDIAC CATHETERIZATION  1976   • TONSILLECTOMY       CURRENT MEDICATIONS:      Current Outpatient Medications:   •  albuterol 2.5 mg /3 mL (0.083 %) nebulizer solution, inhale 3 milliliter by nebulization route 3 times every day, Disp: , Rfl:   •  albuterol HFA 90 mcg/actuation inhaler, INHALE TWO (2) PUFFS BY MOUTH EVERY SIX (6) HOURS, Disp: 8.5 each, Rfl: 3  •  cetirizine (ZyrTEC) 10 mg tablet, Take 1 tablet (10 mg total) by mouth nightly. (Patient taking differently: Take 10 mg by mouth nightly as needed.), Disp: 90 tablet, Rfl: 1  •  cholecalciferol, vitamin D3, (VITAMIN D3) 2,000 unit tablet, 2 (two) times a day.  , Disp: , Rfl:   •  DOCOSAHEXANOIC ACID/EPA (FISH OIL ORAL), take  atotal 3,000  daily, Disp: , Rfl:   •  [START ON 6/2/2022] ezetimibe (ZETIA) 10 mg tablet, Take 1 tablet (10 mg total) by mouth 3 (three) times a week (Tue, Thu, Sat)., Disp: 39 tablet, Rfl: 3  •  famotidine (PEPCID) 20 mg tablet, TAKE 1 TABLET (20 MG TOTAL) BY MOUTH ONCE DAILY., Disp: 90 tablet, Rfl: 3  •  levothyroxine (SYNTHROID) 100 mcg tablet, TAKE ONE (1) TABLET (100 MCG TOTAL) BY MOUTH ONCE DAILY., Disp: 90 tablet, Rfl: 3  •  montelukast (SINGULAIR) 10 mg tablet, TAKE ONE (1) TABLET (10 MG TOTAL) BY MOUTH NIGHTLY. , Disp: 90 tablet, Rfl: 3  •  niacin (ENDUR-ACIN) 500 mg CR tablet, Take 2 tablets (1,000 mg total) by mouth nightly., Disp: 180 tablet, Rfl: 3  •  rosuvastatin (CRESTOR) 5 mg tablet, Take 1 tablet (5 mg total) by mouth 3 (three) times a week (Mon, Wed, Fri)., Disp: 39 tablet, Rfl: 3  •  turmeric-turmeric root extract 450-50 mg capsule, Take by mouth 2 (two) times a day., Disp: , Rfl:   •  verapamil (VERELAN) 240 mg 24 hr capsule, Take 1 capsule (240 mg total)  by mouth nightly., Disp: 90 capsule, Rfl: 3  •  verapamiL 40 mg tablet, TAKE ONE TABLET BY MOUTH ONCE DAILY WHEN NEEDED, Disp: 90 tablet, Rfl: 3  •  verapamil  mg CR tablet, Take 1 tablet (240 mg total) by mouth once daily., Disp: 90 tablet, Rfl: 1    SUPPLEMENTS:  Omega-3 fish oil, vitamin-D, coenzyme-Q10, Reservitol, curcumin, per Dr. Adrian Yang's recommendations, Endur-Acin 500 mg at dinner.    ALLERGIES:  High-dose statins.  Lipitor 20 mg daily caused significant myalgias.  We are going to attempt reintroducing 10 mg Monday, Wednesday, Friday.    FAMILY HISTORY:  Father had multiple myocardial infarctions in his 30's.  Mother with possible coronary artery disease and stroke.  Brother with hypertension.    SOCIAL HISTORY:  The patient lives with her adopted daughter.  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  Occasional glass of wine.  She quit smoking 15 years ago.    REVIEW OF SYSTEMS:  The patient's palpitations have been well controlled with the use of Verelan.  She follows with Dr. Ofelia Muse.  She has had difficulty with taking high-dose statins as they lead to symptoms of myalgias and leg pain.  She has a history of asthma and allergies.    PHYSICAL EXAMINATION:  The patient is a middle-aged female in no acute distress.    Vitals:    06/01/22 1131   BP: 114/80   Pulse: 70   Resp: 18   SpO2: 97%     Wt Readings from Last 3 Encounters:   06/01/22 64.4 kg (142 lb)   04/21/21 63 kg (139 lb)   04/21/21 64.4 kg (142 lb)     HEENT:  Unremarkable.  No xanthelasma or arcus.  Neck:  Supple, no JVD.  Lungs:  Clear to auscultation and percussion.  Cardiac:  Regular rate and rhythm without murmur or gallop.  Abdomen:  Soft, bowel sounds present, no organomegaly.  Extremities:  No edema, pulses intact.  Skin:  Warm and dry.  Neuro:  Alert and oriented X 3.    LABORATORY DATA:      Coronary score:  0, 2016, 0 in 2019 , thoracic aortic calcification.       Carotid ultrasound 2019:  Right Carotid Mild plaque  Normal velocities  Antegrade vertebral   Left Carotid Mild plaque  Normal velocities  Antegrade vertebral     Stress echocardiogram April 2021:  · An exercise stress test was performed following the Richard protocol. The patient reported no symptoms and no angina during the stress test.  · There was no ST segment deviation noted during stress. Arrhythmias noted during stress: rare PVCs .There were no arrhythmias during recovery.  · Stress ECG does not meet criteria for ischemia.  · Post-stress echocardiogram does not meet criteria for ischemia. All ventricular walls become hyperdynamic and the ejection fraction improves.  · Normal ventricle size. Normal wall thickness. Preserved systolic function. Estimated EF 65%. Normal septal motion. No regional wall motion abnormalities. Grade I diastolic dysfunction. Diastolic inflow pattern consistent with impaired relaxation.  · Tricuspid aortic valve. Calcification of the aortic valve leaflets. No evidence of aortic valve regurgitation. . Mild aortic valve stenosis. AV mean gradient = 12.00 mmHg.  · Stress echocardiogram does not meet criteria for myocardial ischemia.    Advanced lipid panel March 2019:  Total cholesterol 218  HDL 73 triglycerides 73 APO B 121.  LP(a) 473 nanomoles per liter.  Inflammation panel: Normal  Endothelial function panel: Abnormal  Metabolic panel: Vitamin D 69  Sterol panel: Hyper absorber of cholesterol.  Normal synthesizer of cholesterol.  Diabetes panel: Insulin resistance  Omega-3 index: 9.1    Lipid panel October 2019:  Component      Latest Ref Rng & Units 10/17/2019 5/27/2022   Triglycerides      30 - 149 mg/dL 70 41   Cholesterol      <=200 mg/dL 227 (H) 185   HDL      >=55 mg/dL 75 79   LDL Calculated      <=100 mg/dL 138 (H) 98   Non-HDL, Calculated      mg/dL 152 106   RISK      <=5.0 3.0 2.3     IMPRESSIONS/RECOMMENDATIONS:  1. Cardiovascular risk assessment.  The patient's  coronary calcium score in 2016 and 2019 remains 0 which places her in a low risk category.  2. Thoracic aortic and carotid arterial calcification.  The patient is at risk for progressive atherosclerosis.   3. Polygenic hypercholesterolemia.  She will continue Crestor 5 mg Monday Wednesday Friday and continue Zetia 10 mg at Tuesday Thursday and Saturday.  She is reached her LDL goal of less than 100.  4. Elevated LP(a).  The patient will increase her Endur-Acin to 1000 mg at dinner.  5. Vitamin-D deficiency.  Continue vitamin-D supplementation.  6. Statin intolerance.  7. Hyperabsorber of cholesterol.  Continue Zetia.  8. Hypothyroidism.  Continue Synthroid.  9.         Paroxysmal supraventricular tachycardia.  The patient is followed by Dr. Ofelia Muse.  She will continue verapamil.  10.       Mild aortic valvular stenosis.  The patient's echocardiogram performed today demonstrates mild aortic valve stenosis.  Mean gradient 12 mmHg.    Summary: Kelly is demonstrating cardiovascular stability.      Her recent stress echo 2021 performed in the office demonstrated normal blood pressure and heart rhythm response to exercise.  Study was negative for ischemia.  She had no chest pain.  The study did demonstrate she has mild aortic valvular stenosis.      She is now followed by Dr. Muse for her PSVT.  She will continue verapamil.      Her carotid ultrasound demonstrates bilateral carotid arteries sclerosis.  We have initiated lipid-lowering therapy.  She will continue with Crestor and Zetia.  She will continue with Endur-Acin for her elevated LP(a).    This is a 30-minute patient encounter with greater than 50% time spent in care coordination counseling.    Sincerely,    Nilson Saucedo MD    6/1/2022    cc: Melonie Guy M.D., [address] [place] [address], [address], [place] [address], FAX: [phone]

## 2022-06-01 NOTE — PROGRESS NOTES
[facility]    2022    Melonie Guy M.D.  [address] [facility]  [address]  [place] [address]    Re:  ARABELLA MEEKS  :  1950    Dear Melonie:    It was my pleasure to see your patient, Arabella Meeks, in the [facility] today.  As you know, we follow her for polygenic hypercholesterolemia, elevated LP(a), statin intolerance and mild aortic valvular sclerosis.    Clinically, she has been doing well.  She denies any chest pain, shortness of breath or palpitations.    As you know, she has undergone previous cardiac workup.  Her coronary calcium score was 0 in May 2016.  She did have a moderate amount of calcification involving in the thoracic aorta as well as the carotid distribution.  Given her family history of a father who had a myocardial infarction in his 30's and a mother who had a stroke at age 72, we elected to begin primary prevention therapy.    Her initial lipid panel revealed a total cholesterol of 228, , HDL 62 and triglycerides 102.  She initiated on combination therapy with atorvastatin 10 mg alternating with Zetia 10 mg.  Her total cholesterol is down to 208, , apoB 99, LDL-P 1197, a reasonable response to this therapy.  Of note, her LP(a) was elevated at 420.  This prompted a discussion and we will begin niacin with Endur-Acin 500 mg at the evening meal.    Over the past year the patient has been on the keto diet.  She is markedly increased her saturated fats.  Her repeat lipid panel reflects this increased saturated fat intake.  Total cholesterol 218  HDL 73 triglycerides 73 APO B 121 and LDL P 1568.  We had a lengthy discussion  concerning cutting back on her saturated fat intake.  She needs to follow a low carbohydrate low saturated fat Mediterranean diet and increase her exercise program.      We recommended that she undergo a repeat coronary calcium score.  Her repeat calcium score in 2019 was 0.  She remains in a very low low risk  quartile.  We discussed her prevention program in detail.  Her repeat lipid panel revealed a total cholesterol 227  HDL 75 and triglycerides 70.  We both agreed to switch her from Lipitor to Crestor at 5 mg Monday Wednesday Friday.  We also agreed to increase her Endur-Acin 1000 mg the evening meal.      Today we discussed her most recent lipid panel from May 2022.  Her total cholesterol was 185 LDL 98 HDL 79 and triglycerides 41.  Certainly an excellent response to the use of Crestor 5 mg alternating with Zetia 10 mg and Endur-Acin 1000 mg.    She does carry history of elevated LP(a) at 420 nmol/L.  Eventually she will be a candidate for the new injectable medication for LP(a) once it is available in the clinic.  She has mild aortic valvular stenosis.    She previously she was complaining of chest pain localized to the right upper chest. This is atypical for CAD however we will schedule her for a stress echo.  Her stress echo was performed  was negative for myocardial ischemia or scar.  Her echo demonstrated that she has mild aortic valvular stenosis.      PAST MEDICAL HISTORY:    Coronary calcium score 0, May 2016.  2019.  Stress echo in 2021 was a normal study.    Thoracic aortic calcification  Carotid arterial calcification  Aortic valvular sclerosis minimal stenosis.  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, Vitamin-D deficiency  Elevated LP(a)  History of PSVT  Past Medical History:   Diagnosis Date   • Anxiety    • Asthma    • Coronary artery disease    • Hyperlipidemia    • Hypothyroidism      PAST SURGICAL HISTORY:    Past Surgical History:   Procedure Laterality Date   • ADENOIDECTOMY     • CARDIAC CATHETERIZATION  1976   • TONSILLECTOMY       CURRENT MEDICATIONS:      Current Outpatient Medications:   •  albuterol 2.5 mg /3 mL (0.083 %) nebulizer solution, inhale 3 milliliter by nebulization route 3 times every day, Disp: , Rfl:   •  albuterol HFA 90 mcg/actuation  inhaler, INHALE TWO (2) PUFFS BY MOUTH EVERY SIX (6) HOURS, Disp: 8.5 each, Rfl: 3  •  cetirizine (ZyrTEC) 10 mg tablet, Take 1 tablet (10 mg total) by mouth nightly. (Patient taking differently: Take 10 mg by mouth nightly as needed.), Disp: 90 tablet, Rfl: 1  •  cholecalciferol, vitamin D3, (VITAMIN D3) 2,000 unit tablet, 2 (two) times a day.  , Disp: , Rfl:   •  DOCOSAHEXANOIC ACID/EPA (FISH OIL ORAL), take  atotal 3,000  daily, Disp: , Rfl:   •  [START ON 6/2/2022] ezetimibe (ZETIA) 10 mg tablet, Take 1 tablet (10 mg total) by mouth 3 (three) times a week (Tue, Thu, Sat)., Disp: 39 tablet, Rfl: 3  •  famotidine (PEPCID) 20 mg tablet, TAKE 1 TABLET (20 MG TOTAL) BY MOUTH ONCE DAILY., Disp: 90 tablet, Rfl: 3  •  levothyroxine (SYNTHROID) 100 mcg tablet, TAKE ONE (1) TABLET (100 MCG TOTAL) BY MOUTH ONCE DAILY., Disp: 90 tablet, Rfl: 3  •  montelukast (SINGULAIR) 10 mg tablet, TAKE ONE (1) TABLET (10 MG TOTAL) BY MOUTH NIGHTLY. , Disp: 90 tablet, Rfl: 3  •  niacin (ENDUR-ACIN) 500 mg CR tablet, Take 2 tablets (1,000 mg total) by mouth nightly., Disp: 180 tablet, Rfl: 3  •  rosuvastatin (CRESTOR) 5 mg tablet, Take 1 tablet (5 mg total) by mouth 3 (three) times a week (Mon, Wed, Fri)., Disp: 39 tablet, Rfl: 3  •  turmeric-turmeric root extract 450-50 mg capsule, Take by mouth 2 (two) times a day., Disp: , Rfl:   •  verapamil (VERELAN) 240 mg 24 hr capsule, Take 1 capsule (240 mg total) by mouth nightly., Disp: 90 capsule, Rfl: 3  •  verapamiL 40 mg tablet, TAKE ONE TABLET BY MOUTH ONCE DAILY WHEN NEEDED, Disp: 90 tablet, Rfl: 3  •  verapamil  mg CR tablet, Take 1 tablet (240 mg total) by mouth once daily., Disp: 90 tablet, Rfl: 1    SUPPLEMENTS:  Omega-3 fish oil, vitamin-D, coenzyme-Q10, Reservitol, curcumin, per Dr. Adrian Yang's recommendations, Endur-Acin 500 mg at dinner.    ALLERGIES:  High-dose statins.  Lipitor 20 mg daily caused significant myalgias.  We are going to attempt reintroducing 10 mg  Monday, Wednesday, Friday.    FAMILY HISTORY:  Father had multiple myocardial infarctions in his 30's.  Mother with possible coronary artery disease and stroke.  Brother with hypertension.    SOCIAL HISTORY:  The patient lives with her adopted daughter.  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  Occasional glass of wine.  She quit smoking 15 years ago.    REVIEW OF SYSTEMS:  The patient's palpitations have been well controlled with the use of Verelan.  She follows with Dr. Ofelia Muse.  She has had difficulty with taking high-dose statins as they lead to symptoms of myalgias and leg pain.  She has a history of asthma and allergies.    PHYSICAL EXAMINATION:  The patient is a middle-aged female in no acute distress.    Vitals:    06/01/22 1131   BP: 114/80   Pulse: 70   Resp: 18   SpO2: 97%     Wt Readings from Last 3 Encounters:   06/01/22 64.4 kg (142 lb)   04/21/21 63 kg (139 lb)   04/21/21 64.4 kg (142 lb)     HEENT:  Unremarkable.  No xanthelasma or arcus.  Neck:  Supple, no JVD.  Lungs:  Clear to auscultation and percussion.  Cardiac:  Regular rate and rhythm without murmur or gallop.  Abdomen:  Soft, bowel sounds present, no organomegaly.  Extremities:  No edema, pulses intact.  Skin:  Warm and dry.  Neuro:  Alert and oriented X 3.    LABORATORY DATA:      Coronary score:  0, 2016, 0 in 2019 , thoracic aortic calcification.      Carotid ultrasound 2019:  Right Carotid Mild plaque  Normal velocities  Antegrade vertebral   Left Carotid Mild plaque  Normal velocities  Antegrade vertebral     Stress echocardiogram April 2021:  · An exercise stress test was performed following the Richard protocol. The patient reported no symptoms and no angina during the stress test.  · There was no ST segment deviation noted during stress. Arrhythmias noted during stress: rare PVCs .There were no arrhythmias during recovery.  · Stress ECG does not meet criteria for  ischemia.  · Post-stress echocardiogram does not meet criteria for ischemia. All ventricular walls become hyperdynamic and the ejection fraction improves.  · Normal ventricle size. Normal wall thickness. Preserved systolic function. Estimated EF 65%. Normal septal motion. No regional wall motion abnormalities. Grade I diastolic dysfunction. Diastolic inflow pattern consistent with impaired relaxation.  · Tricuspid aortic valve. Calcification of the aortic valve leaflets. No evidence of aortic valve regurgitation. . Mild aortic valve stenosis. AV mean gradient = 12.00 mmHg.  · Stress echocardiogram does not meet criteria for myocardial ischemia.    Advanced lipid panel March 2019:  Total cholesterol 218  HDL 73 triglycerides 73 APO B 121.  LP(a) 473 nanomoles per liter.  Inflammation panel: Normal  Endothelial function panel: Abnormal  Metabolic panel: Vitamin D 69  Sterol panel: Hyper absorber of cholesterol.  Normal synthesizer of cholesterol.  Diabetes panel: Insulin resistance  Omega-3 index: 9.1    Lipid panel October 2019:  Component      Latest Ref Rng & Units 10/17/2019 5/27/2022   Triglycerides      30 - 149 mg/dL 70 41   Cholesterol      <=200 mg/dL 227 (H) 185   HDL      >=55 mg/dL 75 79   LDL Calculated      <=100 mg/dL 138 (H) 98   Non-HDL, Calculated      mg/dL 152 106   RISK      <=5.0 3.0 2.3     IMPRESSIONS/RECOMMENDATIONS:  1. Cardiovascular risk assessment.  The patient's coronary calcium score in 2016 and 2019 remains 0 which places her in a low risk category.  2. Thoracic aortic and carotid arterial calcification.  The patient is at risk for progressive atherosclerosis.   3. Polygenic hypercholesterolemia.  She will continue Crestor 5 mg Monday Wednesday Friday and continue Zetia 10 mg at Tuesday Thursday and Saturday.  She is reached her LDL goal of less than 100.  4. Elevated LP(a).  The patient will increase her Endur-Acin to 1000 mg at dinner.  5. Vitamin-D deficiency.  Continue  vitamin-D supplementation.  6. Statin intolerance.  7. Hyperabsorber of cholesterol.  Continue Zetia.  8. Hypothyroidism.  Continue Synthroid.  9.         Paroxysmal supraventricular tachycardia.  The patient is followed by Dr. Ofelia Muse.  She will continue verapamil.  10.       Mild aortic valvular stenosis.  The patient's echocardiogram performed today demonstrates mild aortic valve stenosis.  Mean gradient 12 mmHg.    Summary: Kelly is demonstrating cardiovascular stability.      Her recent stress echo 2021 performed in the office demonstrated normal blood pressure and heart rhythm response to exercise.  Study was negative for ischemia.  She had no chest pain.  The study did demonstrate she has mild aortic valvular stenosis.      She is now followed by Dr. Muse for her PSVT.  She will continue verapamil.      Her carotid ultrasound demonstrates bilateral carotid arteries sclerosis.  We have initiated lipid-lowering therapy.  She will continue with Crestor and Zetia.  She will continue with Endur-Acin for her elevated LP(a).    This is a 30-minute patient encounter with greater than 50% time spent in care coordination counseling.    Sincerely,    Nilson Saucedo MD    6/1/2022    cc: Melonie Guy M.D., [address] [place] [address], [address], [place] [address], FAX: [phone]

## 2022-06-07 NOTE — TELEPHONE ENCOUNTER
Skye from [facility] .  [place] received addend clearance but did not receive the office notes and signed ECG.  Please send to Office fax number: [phone]    Skye contact number: [phone]

## 2022-06-10 NOTE — TELEPHONE ENCOUNTER
Skye calling to receive an update on receive a signed EKG tracing 5/26/2022 from Dr. Muse.    F#: [phone]    Skye can be reached at [phone]

## 2022-06-13 NOTE — TELEPHONE ENCOUNTER
Skye from [facility] calling back to inform that EKG has to be signed by Dr. Muse.     Skye can be reached at [phone]    F: [phone]

## 2022-06-30 ENCOUNTER — TELEPHONE (OUTPATIENT)
Dept: FAMILY MEDICINE | Facility: CLINIC | Age: 72
End: 2022-06-30

## 2022-06-30 ENCOUNTER — TELEPHONE (OUTPATIENT)
Dept: FAMILY MEDICINE | Facility: CLINIC | Age: 72
End: 2022-06-30
Payer: MEDICARE

## 2022-06-30 NOTE — TELEPHONE ENCOUNTER
Dr. West patient. Called on-call service this evening and endorsed two episodes of sweating, palpitations and weakness while outside in the heat for an extended period of time. Patient feeling well at time of call and without symptoms.     Advised she report to UC or ER if this should occur again. Office to call her in the morning and offer a SDS with myself or Dr. Guzman if patient would like.    Noted history of SVT, polygenic hyperlipidemia. Echo stress 4/2021 without ischemia. Followed by Dr. Saucedo, last saw 6/1/22.

## 2022-07-01 ENCOUNTER — OFFICE VISIT (OUTPATIENT)
Dept: FAMILY MEDICINE | Facility: CLINIC | Age: 72
End: 2022-07-01
Payer: MEDICARE

## 2022-07-01 VITALS
TEMPERATURE: 98.2 F | SYSTOLIC BLOOD PRESSURE: 118 MMHG | BODY MASS INDEX: 26.96 KG/M2 | HEART RATE: 78 BPM | OXYGEN SATURATION: 96 % | DIASTOLIC BLOOD PRESSURE: 60 MMHG | WEIGHT: 147.4 LBS

## 2022-07-01 DIAGNOSIS — R00.2 PALPITATION: Primary | ICD-10-CM

## 2022-07-01 DIAGNOSIS — R00.2 HEART PALPITATIONS: ICD-10-CM

## 2022-07-01 DIAGNOSIS — I47.10 SUPRAVENTRICULAR TACHYCARDIA (CMS/HCC): ICD-10-CM

## 2022-07-01 PROCEDURE — 93000 ELECTROCARDIOGRAM COMPLETE: CPT | Performed by: SURGERY

## 2022-07-01 PROCEDURE — 99213 OFFICE O/P EST LOW 20 MIN: CPT | Performed by: SURGERY

## 2022-07-01 ASSESSMENT — ENCOUNTER SYMPTOMS
HEADACHES: 0
ACTIVITY CHANGE: 0
CHILLS: 0
SINUS PRESSURE: 0
WHEEZING: 0
SHORTNESS OF BREATH: 0
CHEST TIGHTNESS: 0
FEVER: 0
WEAKNESS: 0
DIZZINESS: 0
LIGHT-HEADEDNESS: 0
PALPITATIONS: 0
SORE THROAT: 0
SINUS PAIN: 0
FATIGUE: 0
COUGH: 1

## 2022-07-01 NOTE — ASSESSMENT & PLAN NOTE
I believe her symptoms were related to exposure to excessive heat. In both instances, she felt much better after resting in cool air for a short period of time. She is well-appearing today with a normal (unchanged) ECG in my office. No infectious symptoms. Does not need further evaluation. I encouraged her to increase her fluid intake and avoid spending time outdoors in excessive heat, limit walks to bony/dusk.

## 2022-07-01 NOTE — ASSESSMENT & PLAN NOTE
Her ECG today shows normal sinus rhythm. She may have had SVT episodes provoked by the heat this week. Advised no extended time outdoors mid day. Discuss this further with her specialists if symptoms should return.

## 2022-07-01 NOTE — PROGRESS NOTES
Subjective      Patient ID: Kelly Meeks is a 71 y.o. female.     HPI  Acute visit - patient of Dr. West.  PMH s/f SVT, polygenic hyperlipidemia, AV sclerosis, seasonal asthma. Echo stress 4/2021 without ischemia. Cared for by Dr. Saucedo, last saw 6/1/2022 and Dr. Muse, last saw 6/12/22.    Spoke with her yesterday evening while on call.   Endorsed two episodes of sweating, palpitations and weakness while outside in the heat for an extended period of time.     -She had been walking her dog during the first episode, didn't think she would make it back to the house. It came over her very suddenly. Felt confused. Improved after being in the air conditioning for 15 minutes.    -The second episode occurred mid-day when she as at a garden center. Was there for over an hour. Had to sit down, felt overheated. Felt better after sitting for some time in the car.    Patient felt well at time of call and without symptoms. I encouraged increasing hydration, rest, and limiting outdoor activities to bony/dusk during this heat wave.    Today, she states she is feeling perfectly fine. She denies chest pain, denies shortness of breath, and palpitations. Eating and hydrating well. No infectious symptoms. She has seasonal asthma and is still coughing from recent Covid-19 infection, however, has no increase in shortness of breath or breathing difficulty.       Allergies  Meds  Problems         Review of Systems   Constitutional: Negative for activity change, chills, fatigue and fever.   HENT: Negative for congestion, postnasal drip, sinus pressure, sinus pain and sore throat.    Respiratory: Positive for cough. Negative for chest tightness, shortness of breath and wheezing.    Cardiovascular: Negative for chest pain, palpitations and leg swelling.   Neurological: Negative for dizziness, weakness, light-headedness and headaches.       Objective     Physical Exam  Vitals reviewed.   Constitutional:       General: She is not  in acute distress.     Appearance: Normal appearance. She is not ill-appearing.   Eyes:      Extraocular Movements: Extraocular movements intact.   Cardiovascular:      Rate and Rhythm: Normal rate and regular rhythm.      Pulses: Normal pulses.      Heart sounds: Murmur heard.   Pulmonary:      Effort: Pulmonary effort is normal. No respiratory distress.      Breath sounds: Normal breath sounds. No wheezing, rhonchi or rales.   Skin:     General: Skin is warm.   Neurological:      Mental Status: She is oriented to person, place, and time. Mental status is at baseline.   Psychiatric:         Mood and Affect: Mood normal.         Behavior: Behavior normal.         Thought Content: Thought content normal.         Judgment: Judgment normal.         Assessment/Plan   Problem List Items Addressed This Visit        Circulatory    Supraventricular tachycardia (CMS/HCC)     Her ECG today shows normal sinus rhythm. She may have had SVT episodes provoked by the heat this week. Advised no extended time outdoors mid day. Discuss this further with her specialists if symptoms should return.           Palpitation - Primary     I believe her symptoms were related to exposure to excessive heat. In both instances, she felt much better after resting in cool air for a short period of time. She is well-appearing today with a normal (unchanged) ECG in my office. No infectious symptoms. Does not need further evaluation. I encouraged her to increase her fluid intake and avoid spending time outdoors in excessive heat, limit walks to bony/dusk.             Other Visit Diagnoses     Heart palpitations        Relevant Orders    ECG 12 LEAD OFFICE PERFORMED (Completed)          Kylee Hair MD  7/1/2022

## 2022-07-01 NOTE — PATIENT INSTRUCTIONS
Neurologists:    Dr. Say Martin or [place]. Odalys Beckwith [place] [facility]  [address], [address], [address]  [place] [address]

## 2022-07-19 RX ORDER — MONTELUKAST SODIUM 10 MG/1
TABLET ORAL
Qty: 90 TABLET | Refills: 3 | Status: SHIPPED | OUTPATIENT
Start: 2022-07-19 | End: 2023-05-02

## 2022-07-19 NOTE — TELEPHONE ENCOUNTER
Medicine Refill Request    Last Office: 7/1/2022   Last Consult Visit: Visit date not found  Last Telemedicine Visit: 4/15/2021 Melonie Guy MD    Next Appointment: Visit date not found      Current Outpatient Medications:   •  albuterol 2.5 mg /3 mL (0.083 %) nebulizer solution, inhale 3 milliliter by nebulization route 3 times every day, Disp: , Rfl:   •  albuterol HFA 90 mcg/actuation inhaler, INHALE TWO (2) PUFFS BY MOUTH EVERY SIX (6) HOURS, Disp: 8.5 each, Rfl: 3  •  cetirizine (ZyrTEC) 10 mg tablet, Take 1 tablet (10 mg total) by mouth nightly. (Patient taking differently: Take 10 mg by mouth nightly as needed.), Disp: 90 tablet, Rfl: 1  •  cholecalciferol, vitamin D3, (VITAMIN D3) 2,000 unit tablet, 2 (two) times a day.  , Disp: , Rfl:   •  DOCOSAHEXANOIC ACID/EPA (FISH OIL ORAL), take  atotal 3,000  daily, Disp: , Rfl:   •  ezetimibe (ZETIA) 10 mg tablet, Take 1 tablet (10 mg total) by mouth 3 (three) times a week (Tue, Thu, Sat)., Disp: 39 tablet, Rfl: 3  •  famotidine (PEPCID) 20 mg tablet, TAKE 1 TABLET (20 MG TOTAL) BY MOUTH ONCE DAILY., Disp: 90 tablet, Rfl: 3  •  levothyroxine (SYNTHROID) 100 mcg tablet, TAKE ONE (1) TABLET (100 MCG TOTAL) BY MOUTH ONCE DAILY., Disp: 90 tablet, Rfl: 3  •  montelukast (SINGULAIR) 10 mg tablet, TAKE ONE (1) TABLET (10 MG TOTAL) BY MOUTH NIGHTLY. , Disp: 90 tablet, Rfl: 3  •  niacin (ENDUR-ACIN) 500 mg CR tablet, Take 2 tablets (1,000 mg total) by mouth nightly., Disp: 180 tablet, Rfl: 3  •  rosuvastatin (CRESTOR) 5 mg tablet, Take 1 tablet (5 mg total) by mouth 3 (three) times a week (Mon, Wed, Fri)., Disp: 39 tablet, Rfl: 3  •  turmeric-turmeric root extract 450-50 mg capsule, Take by mouth 2 (two) times a day., Disp: , Rfl:   •  verapamil (VERELAN) 240 mg 24 hr capsule, Take 1 capsule (240 mg total) by mouth nightly., Disp: 90 capsule, Rfl: 3  •  verapamiL 40 mg tablet, TAKE ONE TABLET BY MOUTH ONCE DAILY WHEN NEEDED, Disp: 90 tablet, Rfl: 3  •  verapamil   mg CR tablet, Take 1 tablet (240 mg total) by mouth once daily., Disp: 90 tablet, Rfl: 1      BP Readings from Last 3 Encounters:   07/01/22 118/60   06/01/22 114/80   05/26/22 122/76       Recent Lab results:  Lab Results   Component Value Date    CHOL 185 05/27/2022   ,   Lab Results   Component Value Date    HDL 79 05/27/2022   ,   Lab Results   Component Value Date    LDLCALC 98 05/27/2022   ,   Lab Results   Component Value Date    TRIG 41 05/27/2022        Lab Results   Component Value Date    GLUCOSE 90 05/27/2022   , No results found for: HGBA1C      Lab Results   Component Value Date    CREATININE 0.8 05/27/2022       Lab Results   Component Value Date    TSH 4.46 05/27/2022

## 2022-09-12 ENCOUNTER — TELEPHONE (OUTPATIENT)
Dept: FAMILY MEDICINE | Facility: CLINIC | Age: 72
End: 2022-09-12
Payer: MEDICARE

## 2022-09-12 RX ORDER — VERAPAMIL HYDROCHLORIDE 240 MG/1
240 CAPSULE, DELAYED RELEASE ORAL NIGHTLY
Qty: 90 CAPSULE | Refills: 3 | Status: SHIPPED | OUTPATIENT
Start: 2022-09-12 | End: 2023-09-25

## 2022-09-12 NOTE — TELEPHONE ENCOUNTER
Pt requesting refill for verapamil  mg CR tablet. Can be sent to [facility]. Please give pt call once completed

## 2022-09-29 ENCOUNTER — OFFICE VISIT (OUTPATIENT)
Dept: FAMILY MEDICINE | Facility: CLINIC | Age: 72
End: 2022-09-29
Payer: MEDICARE

## 2022-09-29 ENCOUNTER — TELEPHONE (OUTPATIENT)
Dept: FAMILY MEDICINE | Facility: CLINIC | Age: 72
End: 2022-09-29

## 2022-09-29 VITALS
HEART RATE: 82 BPM | SYSTOLIC BLOOD PRESSURE: 100 MMHG | TEMPERATURE: 98.6 F | DIASTOLIC BLOOD PRESSURE: 78 MMHG | OXYGEN SATURATION: 99 %

## 2022-09-29 DIAGNOSIS — R53.83 FATIGUE, UNSPECIFIED TYPE: ICD-10-CM

## 2022-09-29 DIAGNOSIS — E72.11 HOMOCYSTINURIA (CMS/HCC): ICD-10-CM

## 2022-09-29 DIAGNOSIS — R05.9 COUGH: Primary | ICD-10-CM

## 2022-09-29 LAB
FLUAV RNA SPEC QL NAA+PROBE: NEGATIVE
FLUBV RNA SPEC QL NAA+PROBE: NEGATIVE
RSV RNA SPEC QL NAA+PROBE: NEGATIVE
SARS-COV-2 RNA RESP QL NAA+PROBE: NEGATIVE

## 2022-09-29 PROCEDURE — 87637 SARSCOV2&INF A&B&RSV AMP PRB: CPT | Performed by: NURSE PRACTITIONER

## 2022-09-29 PROCEDURE — 99213 OFFICE O/P EST LOW 20 MIN: CPT | Performed by: NURSE PRACTITIONER

## 2022-09-29 ASSESSMENT — ENCOUNTER SYMPTOMS
WHEEZING: 0
FATIGUE: 1
SHORTNESS OF BREATH: 0
SORE THROAT: 0
PALPITATIONS: 0
COUGH: 1
CHEST TIGHTNESS: 0
FACIAL ASYMMETRY: 0
DIARRHEA: 0
SINUS PRESSURE: 1
VOMITING: 0
ABDOMINAL PAIN: 0
DYSURIA: 0
HEADACHES: 1
NAUSEA: 0
RHINORRHEA: 1
DIFFICULTY URINATING: 0
DIZZINESS: 0

## 2022-09-29 ASSESSMENT — PAIN SCALES - GENERAL: PAINLEVEL: 4

## 2022-09-29 NOTE — TELEPHONE ENCOUNTER
[facility] Appointment Request   Provider:  Dr West   Appointment Type: OV  Reason for Visit: persistant cough post covid / asthma induced and extreme Fatigue, possible long covid   Available Day and Time: any   Best Contact Number: [phone]    The practice will reach out to schedule your appointment within the next 2 business days.

## 2022-09-29 NOTE — PATIENT INSTRUCTIONS
Fluticasone Propionate (Flonase) - Fluticasone is a nasal steroid decongestant. It works on the membranes in the nasal passages to reduce congestion. It does not impact the rest of the body like a steroid pill does. You can You can buy the generic or brand name. It is sold over the counter without a prescription. You use two sprays each nostril a day.

## 2022-09-29 NOTE — PROGRESS NOTES
PHOENIX Ramos  [facility]  [address] [place] [address], [address]  [place] [address]  [phone]       Kelly Meeks is a 72 y.o. female who presents today for   Chief Complaint   Patient presents with    Nasal Congestion       Allergies  Epinephrine, Procaine, and Caffeine      Current Outpatient Medications:     albuterol 2.5 mg /3 mL (0.083 %) nebulizer solution, inhale 3 milliliter by nebulization route 3 times every day, Disp: , Rfl:     albuterol HFA 90 mcg/actuation inhaler, INHALE TWO (2) PUFFS BY MOUTH EVERY SIX (6) HOURS, Disp: 8.5 each, Rfl: 3    cholecalciferol, vitamin D3, (VITAMIN D3) 2,000 unit tablet, 2 (two) times a day.  , Disp: , Rfl:     DOCOSAHEXANOIC ACID/EPA (FISH OIL ORAL), take  atotal 3,000  daily, Disp: , Rfl:     ezetimibe (ZETIA) 10 mg tablet, Take 1 tablet (10 mg total) by mouth 3 (three) times a week (Tue, Thu, Sat)., Disp: 39 tablet, Rfl: 3    famotidine (PEPCID) 20 mg tablet, TAKE 1 TABLET (20 MG TOTAL) BY MOUTH ONCE DAILY., Disp: 90 tablet, Rfl: 3    levothyroxine (SYNTHROID) 100 mcg tablet, TAKE ONE (1) TABLET (100 MCG TOTAL) BY MOUTH ONCE DAILY., Disp: 90 tablet, Rfl: 3    montelukast (SINGULAIR) 10 mg tablet, TAKE ONE (1) TABLET (10 MG TOTAL) BY MOUTH NIGHTLY., Disp: 90 tablet, Rfl: 3    niacin (ENDUR-ACIN) 500 mg CR tablet, Take 2 tablets (1,000 mg total) by mouth nightly., Disp: 180 tablet, Rfl: 3    turmeric-turmeric root extract 450-50 mg capsule, Take by mouth 2 (two) times a day., Disp: , Rfl:     verapamiL (VERELAN) 240 mg 24 hr capsule, Take 1 capsule (240 mg total) by mouth nightly., Disp: 90 capsule, Rfl: 3    verapamiL 40 mg tablet, TAKE ONE TABLET BY MOUTH ONCE DAILY WHEN NEEDED, Disp: 90 tablet, Rfl: 3    cetirizine (ZyrTEC) 10 mg tablet, Take 1 tablet (10 mg total) by mouth nightly. (Patient taking differently: Take 10 mg by mouth nightly as needed.), Disp: 90 tablet, Rfl: 1    rosuvastatin (CRESTOR) 5 mg tablet, Take 1  tablet (5 mg total) by mouth 3 (three) times a week (Mon, Wed, Fri)., Disp: 39 tablet, Rfl: 3    HPI    1) cough, rhinitis, sneezing  Ongoing since COVID  + in 5/2022  Also, fatigue and leg pain  Pt hx of mild asthma - trigger is cold weather but has been having sx since having COVID  Cough is moist and productive  Does not cough at night when lying down - states cough does not keep her awake    Prior to COVID - did not need inhalers in the summer  Prior to COVID - would use albuterol MDI - did not need nebulized albuterol generally    Since COVID -  Would use albuterol MDI once daily in the summer  Now that cooler weather - Albuterol 2/4 times a week  States that Albuterol provides some sx reduction   Is taking both singular and zyrtec regularly    Last allergy testing about 15 years ago  Pt does not have allergist    Has been testing regularly for COVID with home antigen tests    Smoked from 24-[employer] 31 years age      2) Seeking care today because fatigue has increased the last 2 days.  Chills started yesterday      3) Legs hurt (generalized) after walking the dog - ongoing issue  ? Has gotten worse  Used a topical foam (Theraworx) - pt does not recall name - this used to relieve sx but is not anymore  Also takes Magnesium 250mg in morning    Last labs 5/2022      Review of Systems   Constitutional: Positive for fatigue.   HENT: Positive for postnasal drip (consistent), rhinorrhea (consistent) and sinus pressure (consistant). Negative for ear pain and sore throat.    Respiratory: Positive for cough (moist and productive - pale mucous). Negative for chest tightness, shortness of breath and wheezing.    Cardiovascular: Negative for chest pain, palpitations and leg swelling.   Gastrointestinal: Negative for abdominal pain, diarrhea, nausea and vomiting.   Genitourinary: Negative for difficulty urinating and dysuria.   Neurological: Positive for headaches (this am - resolved with 2 Tylenol). Negative for dizziness and  facial asymmetry.       Objective   Vitals:    09/29/22 1435   BP: 100/78   Pulse: 82   Temp: 37 °C (98.6 °F)   TempSrc: Temporal   SpO2: 99%       There is no height or weight on file to calculate BMI.    Physical Exam  Constitutional:       General: She is not in acute distress.     Appearance: She is not ill-appearing or diaphoretic.   HENT:      Right Ear: Ear canal normal. Tympanic membrane is bulging (mild).      Left Ear: Ear canal normal. Tympanic membrane is bulging (mild).      Nose:      Right Turbinates: Not swollen or pale.      Left Turbinates: Not swollen or pale.      Right Sinus: No maxillary sinus tenderness or frontal sinus tenderness.      Left Sinus: No maxillary sinus tenderness or frontal sinus tenderness.      Mouth/Throat:      Mouth: Mucous membranes are moist.      Pharynx: No pharyngeal swelling, oropharyngeal exudate or posterior oropharyngeal erythema.      Tonsils: No tonsillar exudate.   Cardiovascular:      Rate and Rhythm: Normal rate and regular rhythm.      Heart sounds: S1 normal and S2 normal. Murmur heard.    Systolic murmur is present with a grade of 2/6.     Comments: Moist Cough with moderate frequency  Pulmonary:      Effort: Pulmonary effort is normal. No respiratory distress.      Breath sounds: No decreased breath sounds, wheezing, rhonchi or rales.   Chest:   Breasts:      Right: No supraclavicular adenopathy.      Left: No supraclavicular adenopathy.       Musculoskeletal:      Right lower leg: No edema.      Left lower leg: No edema.   Lymphadenopathy:      Cervical: No cervical adenopathy.      Upper Body:      Right upper body: No supraclavicular adenopathy.      Left upper body: No supraclavicular adenopathy.   Neurological:      Mental Status: She is alert.         Assessment   Diagnoses and all orders for this visit:    Cough (Primary)  -     COVID- 19 PCR Symptomatic (includes FLU A/B & RSV) - [facility]    Fatigue, unspecified type  -     COVID- 19 PCR Symptomatic  (includes FLU A/B & RSV) - [facility]    Homocystinuria (CMS/HCC)  Comments:  Monitored by PCP      CBC, CMP, TSH, Vit D from 5/27/2022 is normal  R/O COVID with PCR  Start Flonase Nasal Spray to reduce PND secretions and role in cough  Increase Albuterol to TID regularly  If COVID is negative - CXR    Follow up with Dr West in approx 3 weeks - can further address leg discomfort then           PHOENIX Huffman  9/29/2022

## 2022-09-30 NOTE — TELEPHONE ENCOUNTER
Attempt to reach pt regarding neg COVID PCR and CXR order. No answer. Left brief non-detailed message on vm regarding attempt and informing I will send portal message too.

## 2022-10-01 ENCOUNTER — NURSE TRIAGE (OUTPATIENT)
Dept: PRIMARY CARE | Facility: CLINIC | Age: 72
End: 2022-10-01
Payer: COMMERCIAL

## 2022-10-01 ENCOUNTER — HOSPITAL ENCOUNTER (OUTPATIENT)
Dept: RADIOLOGY | Age: 72
Discharge: HOME | End: 2022-10-01
Attending: NURSE PRACTITIONER
Payer: MEDICARE

## 2022-10-01 DIAGNOSIS — R05.9 COUGH: ICD-10-CM

## 2022-10-01 DIAGNOSIS — R53.83 FATIGUE, UNSPECIFIED TYPE: ICD-10-CM

## 2022-10-01 PROCEDURE — 71046 X-RAY EXAM CHEST 2 VIEWS: CPT

## 2022-10-01 NOTE — TELEPHONE ENCOUNTER
Called for CXR results. Had CXR done today. Will call back for results. No change in patient. Stable. Strict ER precautions.

## 2022-10-01 NOTE — TELEPHONE ENCOUNTER
Patient daughter called to state mom has chills, throwing up  and low grade temp that has developed since office visit on 9/29 . She has thrown up twice in the last two hours. Denies starting any new medications. Appetite is decreased and drinking fluids. Advised patient to have chest x ray done today as ordered.If patient can not have it done go to  for evaluation and testing. Reviewed viral and pneumonia protocol with patient. Verbalized understanding. Will call back if has CXR done outpatient.

## 2022-10-03 ENCOUNTER — TELEPHONE (OUTPATIENT)
Dept: FAMILY MEDICINE | Facility: CLINIC | Age: 72
End: 2022-10-03
Payer: COMMERCIAL

## 2022-10-03 NOTE — PROGRESS NOTES
72 yr old presents for follow up to 9/29/22 NP visit to eval rhinitis  Dx covid 5/2022, ongoing PND  Stared on flonase  pcr neg 10/1/22  cxr  IMPRESSION: Stable scarring at the left lung base   Prev xray 2019    Was vomiting on Saturday, no fever but felt flushed. No abdominal pain, no diarrhea.     Cough is very bothersome  Has had a chronic cough in the past due to RAD  covid in May has set off coughing again     Initially seen by me 10/2021 for JEREMÍAS  Last visit with Dr Guy for TMJ pain in 4/2021, referred to PT. Dr Guy had peen prescribing klonopin for chronic use  Sees Dr Saucedo for mgmt, HL  Cac score 0 in 2019. Neg stress echo 4/2021  Sees Dr Muse for hx SVT  Sees Dr Duque for gyn care  Saw Dr Hair for palpitations 7/1/22    Hx hypothyroid on replacement and chronic benzodiazapine use prescribed by Dr Guy      mammo 9/2022 Dot Orozco   [place] 1/2017 repeat in 10 yrs  Hx osteopenia, dexa ordered 12/2020    Past Medical History:   Diagnosis Date    Anxiety     Asthma     Coronary artery disease     Hyperlipidemia     Hypothyroidism      Past Surgical History:   Procedure Laterality Date    ADENOIDECTOMY      CARDIAC CATHETERIZATION  1976    TONSILLECTOMY       Social History     Socioeconomic History    Marital status: Single   Tobacco Use    Smoking status: Former Smoker    Smokeless tobacco: Never Used    Tobacco comment: stopped @ age 31   Substance and Sexual Activity    Alcohol use: Yes     Comment: daily    Drug use: No    Sexual activity: Defer   Social History Narrative    Lives alone, , has a daughter         Active - walks on treadmill, has dog - walks him regularly      Works as realtor     Family History   Problem Relation Age of Onset    Heart attack Biological Mother     Stroke Biological Mother     Heart attack Biological Father      Review of systems negative in detail except as noted in CC and HPI    Current Outpatient Medications   Medication Sig Dispense  Refill    albuterol 2.5 mg /3 mL (0.083 %) nebulizer solution inhale 3 milliliter by nebulization route 3 times every day      albuterol HFA 90 mcg/actuation inhaler INHALE TWO (2) PUFFS BY MOUTH EVERY SIX (6) HOURS 8.5 each 3    cetirizine (ZyrTEC) 10 mg tablet Take 1 tablet (10 mg total) by mouth nightly. (Patient taking differently: Take 10 mg by mouth nightly as needed.) 90 tablet 1    cholecalciferol, vitamin D3, (VITAMIN D3) 2,000 unit tablet 2 (two) times a day.        DOCOSAHEXANOIC ACID/EPA (FISH OIL ORAL) take  atotal 3,000  daily      ezetimibe (ZETIA) 10 mg tablet Take 1 tablet (10 mg total) by mouth 3 (three) times a week (Tue, Thu, Sat). 39 tablet 3    famotidine (PEPCID) 20 mg tablet TAKE 1 TABLET (20 MG TOTAL) BY MOUTH ONCE DAILY. 90 tablet 3    levothyroxine (SYNTHROID) 100 mcg tablet TAKE ONE (1) TABLET (100 MCG TOTAL) BY MOUTH ONCE DAILY. 90 tablet 3    montelukast (SINGULAIR) 10 mg tablet TAKE ONE (1) TABLET (10 MG TOTAL) BY MOUTH NIGHTLY. 90 tablet 3    niacin (ENDUR-ACIN) 500 mg CR tablet Take 2 tablets (1,000 mg total) by mouth nightly. 180 tablet 3    rosuvastatin (CRESTOR) 5 mg tablet Take 1 tablet (5 mg total) by mouth 3 (three) times a week (Mon, Wed, Fri). 39 tablet 3    turmeric-turmeric root extract 450-50 mg capsule Take by mouth 2 (two) times a day.      verapamiL (VERELAN) 240 mg 24 hr capsule Take 1 capsule (240 mg total) by mouth nightly. 90 capsule 3    verapamiL 40 mg tablet TAKE ONE TABLET BY MOUTH ONCE DAILY WHEN NEEDED 90 tablet 3     No current facility-administered medications for this visit.     Allergies   Allergen Reactions    Epinephrine      caused SVT    Procaine      Use carbicaine    Caffeine      Other reaction(s): svt attack pt can only have decaf coffee       Vitals:    10/04/22 1027   BP: 122/74   Pulse: 68   Temp: 36.9 °C (98.4 °F)   SpO2: 99%     Gait: normal   Conjunctiva: pink Sclera: white EOMI  TM snl bl  Op clear  Neck supple thyroid nl  size no lad  Skin: no jaundice, no visable rash  Resp: no accessory muscle use cta al fields  cv rrr s m  abd soft ntnd nlbs no hsm no masses  Joints: no visable deformities  No edema  Mental Status: alert and oriented      1. Chronic cough  Lungs cta all fields. No sign of sinusitis.  Will try flovent to treat RAD, if not better refer for PFTs.  cxr reviewed no acute findings  - fluticasone HFA (FLOVENT HFA) 110 mcg/actuation inhaler; Inhale 1 puff 2 (two) times a day. Rinse mouth after use  Dispense: 12 g; Refill: 5    2. Leg cramps  Inc co q 10 to 200mg per day  - Ambulatory referral to Physical Therapy; Future    3. Persistent asthma with acute exacerbation, unspecified asthma severity      4. Mixed hyperlipidemia  Cont low dose crestor inc co q 10 to help with cramps    5. Acquired hypothyroidism  Clinically euthryoid on stable replacement    6. Nausea  Seen in ED over the weekend. Sx resolved. Suspect viral syndrome. abd benign    7. Weakness of both lower extremities    - Ambulatory referral to Physical Therapy; Future    8. Lumbar radiculopathy  Mri reviewed  - Ambulatory referral to Physical Therapy; Future    9. Needs flu shot    - Influenza vaccine Quad Adjuvanted 65 and Older (FluAd Quad)    Massiel West MD  10/04/22

## 2022-10-03 NOTE — TELEPHONE ENCOUNTER
"Call to pt re:   CXR = neg  Update regarding note by Triage NP over the weekend    10/1 - pt increase chills, fatigue, vomiting, low grade temp  10/1 completed CXR    10/2 Slept a lot over weekend  No more vomiting, able to eat, no more fever  Fatigue continued    10/3 Today, pt states she is feels she is generally improving  Back to eating normally  Energy improving    Previously occurring, and ongoing cough, rhinitis continues - \"marginal improvement\" with increased Albuterol and Flonase  Previously occurring and ongoing leg discomfort continues    Pt scheduled to see Dr West tomorrow to discuss ongoing issues  Advised pt to continue to rest today, Fluids, PO as tolerated.  Be in touch if any change today.      " Azithromycin Pregnancy And Lactation Text: This medication is considered safe during pregnancy and is also secreted in breast milk.

## 2022-10-04 ENCOUNTER — OFFICE VISIT (OUTPATIENT)
Dept: FAMILY MEDICINE | Facility: CLINIC | Age: 72
End: 2022-10-04
Payer: MEDICARE

## 2022-10-04 VITALS
SYSTOLIC BLOOD PRESSURE: 122 MMHG | HEART RATE: 68 BPM | DIASTOLIC BLOOD PRESSURE: 74 MMHG | TEMPERATURE: 98.4 F | OXYGEN SATURATION: 99 %

## 2022-10-04 DIAGNOSIS — R05.3 CHRONIC COUGH: Primary | ICD-10-CM

## 2022-10-04 DIAGNOSIS — E78.2 MIXED HYPERLIPIDEMIA: ICD-10-CM

## 2022-10-04 DIAGNOSIS — Z23 NEEDS FLU SHOT: ICD-10-CM

## 2022-10-04 DIAGNOSIS — R29.898 WEAKNESS OF BOTH LOWER EXTREMITIES: ICD-10-CM

## 2022-10-04 DIAGNOSIS — R25.2 LEG CRAMPS: ICD-10-CM

## 2022-10-04 DIAGNOSIS — R11.0 NAUSEA: ICD-10-CM

## 2022-10-04 DIAGNOSIS — J45.901 PERSISTENT ASTHMA WITH ACUTE EXACERBATION, UNSPECIFIED ASTHMA SEVERITY: ICD-10-CM

## 2022-10-04 DIAGNOSIS — E03.9 ACQUIRED HYPOTHYROIDISM: ICD-10-CM

## 2022-10-04 DIAGNOSIS — M54.16 LUMBAR RADICULOPATHY: ICD-10-CM

## 2022-10-04 PROCEDURE — 90694 VACC AIIV4 NO PRSRV 0.5ML IM: CPT | Performed by: INTERNAL MEDICINE

## 2022-10-04 PROCEDURE — 99214 OFFICE O/P EST MOD 30 MIN: CPT | Mod: 25 | Performed by: INTERNAL MEDICINE

## 2022-10-04 PROCEDURE — G0008 ADMIN INFLUENZA VIRUS VAC: HCPCS | Performed by: INTERNAL MEDICINE

## 2022-10-04 RX ORDER — FLUTICASONE PROPIONATE 110 UG/1
1 AEROSOL, METERED RESPIRATORY (INHALATION) 2 TIMES DAILY
Qty: 12 G | Refills: 5 | Status: SHIPPED | OUTPATIENT
Start: 2022-10-04 | End: 2023-11-28

## 2022-10-05 ENCOUNTER — TELEPHONE (OUTPATIENT)
Dept: FAMILY MEDICINE | Facility: CLINIC | Age: 72
End: 2022-10-05
Payer: COMMERCIAL

## 2022-10-05 NOTE — TELEPHONE ENCOUNTER
Pt stated her fluticasone HFA (FLOVENT HFA) 110 mcg/actuation inhaler her $300 dollars and she can not afford that amount. Pt asking for a cheaper inhaler. Please notify pt when new inhaler has been ordered. Please advise

## 2022-10-05 NOTE — TELEPHONE ENCOUNTER
Spoke with pt informing her that unfortunately we would never know the cost of a medication through her insurance so I asked that she do some research with her insurance to see what is feasible for her. Pt stated that all of the steroid inhalers end up being that high cost which she is not willing to pay. Pt is now asking for a steroid in pill form to replace the inhaler.

## 2022-10-06 RX ORDER — FLUTICASONE PROPIONATE AND SALMETEROL 100; 50 UG/1; UG/1
1 POWDER RESPIRATORY (INHALATION)
Qty: 60 EACH | Refills: 5 | Status: SHIPPED | OUTPATIENT
Start: 2022-10-06 | End: 2023-06-28

## 2022-10-06 NOTE — TELEPHONE ENCOUNTER
Spoke with pharmacist and was able to get a the price of the Advair. I was informed that generic  Advair is $109.50. I called and left pt a detailed vm with this information asking if she is okay with this cost and if not to give the office a call back and let us know.

## 2022-10-06 NOTE — TELEPHONE ENCOUNTER
Pt called back giving ok to prescribed $105 generic Advair. Pt requesting a return call to go over administering instructions for new Advair medication.

## 2022-10-06 NOTE — TELEPHONE ENCOUNTER
Spoke with pt relaying instructions on how to take Advair. Pt will monitor symptoms to see if this medication is helpful and will give us a call back with update.

## 2022-10-06 NOTE — TELEPHONE ENCOUNTER
Looks like advair may be cheaper. I sent this to her pharmacy - she should talk with her pharmacist about the cost and if it is also too expensive then get him to recommend an option - he has the database that can look I tup

## 2022-10-19 RX ORDER — ALBUTEROL SULFATE 90 UG/1
INHALANT RESPIRATORY (INHALATION)
Qty: 1 EACH | Refills: 3 | Status: SHIPPED | OUTPATIENT
Start: 2022-10-19 | End: 2024-06-19 | Stop reason: SDUPTHER

## 2023-02-27 RX ORDER — LEVOTHYROXINE SODIUM 100 UG/1
TABLET ORAL
Qty: 90 TABLET | Refills: 3 | Status: SHIPPED | OUTPATIENT
Start: 2023-02-27 | End: 2024-03-20

## 2023-02-27 NOTE — TELEPHONE ENCOUNTER
Medicine Refill Request    Last Office: 10/4/2022   Last Consult Visit: Visit date not found  Last Telemedicine Visit: 4/15/2021 Melonie Guy MD    Next Appointment: Visit date not found      Current Outpatient Medications:   •  albuterol 2.5 mg /3 mL (0.083 %) nebulizer solution, inhale 3 milliliter by nebulization route 3 times every day, Disp: , Rfl:   •  albuterol HFA (VENTOLIN HFA) 90 mcg/actuation inhaler, INHALE TWO (2) PUFFS BY MOUTH EVERY SIX (6) HOURS, Disp: 1 each, Rfl: 3  •  cetirizine (ZyrTEC) 10 mg tablet, Take 1 tablet (10 mg total) by mouth nightly. (Patient taking differently: Take 10 mg by mouth nightly as needed.), Disp: 90 tablet, Rfl: 1  •  cholecalciferol, vitamin D3, (VITAMIN D3) 2,000 unit tablet, 2 (two) times a day.  , Disp: , Rfl:   •  DOCOSAHEXANOIC ACID/EPA (FISH OIL ORAL), take  atotal 3,000  daily, Disp: , Rfl:   •  ezetimibe (ZETIA) 10 mg tablet, Take 1 tablet (10 mg total) by mouth 3 (three) times a week (Tue, Thu, Sat)., Disp: 39 tablet, Rfl: 3  •  famotidine (PEPCID) 20 mg tablet, TAKE 1 TABLET (20 MG TOTAL) BY MOUTH ONCE DAILY., Disp: 90 tablet, Rfl: 3  •  fluticasone HFA (FLOVENT HFA) 110 mcg/actuation inhaler, Inhale 1 puff 2 (two) times a day. Rinse mouth after use, Disp: 12 g, Rfl: 5  •  fluticasone propion-salmeteroL (ADVAIR DISKUS) 100-50 mcg/dose diskus inhaler, Inhale 1 puff 2 (two) times a day., Disp: 60 each, Rfl: 5  •  levothyroxine (SYNTHROID) 100 mcg tablet, TAKE ONE (1) TABLET (100 MCG TOTAL) BY MOUTH ONCE DAILY., Disp: 90 tablet, Rfl: 3  •  montelukast (SINGULAIR) 10 mg tablet, TAKE ONE (1) TABLET (10 MG TOTAL) BY MOUTH NIGHTLY., Disp: 90 tablet, Rfl: 3  •  niacin (ENDUR-ACIN) 500 mg CR tablet, Take 2 tablets (1,000 mg total) by mouth nightly., Disp: 180 tablet, Rfl: 3  •  rosuvastatin (CRESTOR) 5 mg tablet, Take 1 tablet (5 mg total) by mouth 3 (three) times a week (Mon, Wed, Fri)., Disp: 39 tablet, Rfl: 3  •  turmeric-turmeric root extract 450-50 mg capsule,  Take by mouth 2 (two) times a day., Disp: , Rfl:   •  verapamiL (VERELAN) 240 mg 24 hr capsule, Take 1 capsule (240 mg total) by mouth nightly., Disp: 90 capsule, Rfl: 3  •  verapamiL 40 mg tablet, TAKE ONE TABLET BY MOUTH ONCE DAILY WHEN NEEDED, Disp: 90 tablet, Rfl: 3      BP Readings from Last 3 Encounters:   10/04/22 122/74   09/29/22 100/78   07/01/22 118/60       Recent Lab results:  Lab Results   Component Value Date    CHOL 185 05/27/2022   ,   Lab Results   Component Value Date    HDL 79 05/27/2022   ,   Lab Results   Component Value Date    LDLCALC 98 05/27/2022   ,   Lab Results   Component Value Date    TRIG 41 05/27/2022        Lab Results   Component Value Date    GLUCOSE 90 05/27/2022   , No results found for: HGBA1C      Lab Results   Component Value Date    CREATININE 0.8 05/27/2022       Lab Results   Component Value Date    TSH 4.46 05/27/2022

## 2023-05-02 RX ORDER — MONTELUKAST SODIUM 10 MG/1
TABLET ORAL
Qty: 90 TABLET | Refills: 3 | Status: SHIPPED | OUTPATIENT
Start: 2023-05-02 | End: 2024-04-30 | Stop reason: SDUPTHER

## 2023-05-02 NOTE — TELEPHONE ENCOUNTER
Medicine Refill Request    Last Office: 10/4/2022   Last Consult Visit: Visit date not found  Last Telemedicine Visit: 4/15/2021 Melonie Guy MD    Next Appointment: Visit date not found      Current Outpatient Medications:   •  levothyroxine (SYNTHROID) 100 mcg tablet, TAKE ONE TABLET BY MOUTH ONCE DAILY, Disp: 90 tablet, Rfl: 3  •  albuterol 2.5 mg /3 mL (0.083 %) nebulizer solution, inhale 3 milliliter by nebulization route 3 times every day, Disp: , Rfl:   •  albuterol HFA (VENTOLIN HFA) 90 mcg/actuation inhaler, INHALE TWO (2) PUFFS BY MOUTH EVERY SIX (6) HOURS, Disp: 1 each, Rfl: 3  •  cetirizine (ZyrTEC) 10 mg tablet, Take 1 tablet (10 mg total) by mouth nightly. (Patient taking differently: Take 10 mg by mouth nightly as needed.), Disp: 90 tablet, Rfl: 1  •  cholecalciferol, vitamin D3, (VITAMIN D3) 2,000 unit tablet, 2 (two) times a day.  , Disp: , Rfl:   •  DOCOSAHEXANOIC ACID/EPA (FISH OIL ORAL), take  atotal 3,000  daily, Disp: , Rfl:   •  ezetimibe (ZETIA) 10 mg tablet, Take 1 tablet (10 mg total) by mouth 3 (three) times a week (Tue, Thu, Sat)., Disp: 39 tablet, Rfl: 3  •  famotidine (PEPCID) 20 mg tablet, TAKE 1 TABLET (20 MG TOTAL) BY MOUTH ONCE DAILY., Disp: 90 tablet, Rfl: 3  •  fluticasone HFA (FLOVENT HFA) 110 mcg/actuation inhaler, Inhale 1 puff 2 (two) times a day. Rinse mouth after use, Disp: 12 g, Rfl: 5  •  fluticasone propion-salmeteroL (ADVAIR DISKUS) 100-50 mcg/dose diskus inhaler, Inhale 1 puff 2 (two) times a day., Disp: 60 each, Rfl: 5  •  montelukast (SINGULAIR) 10 mg tablet, TAKE ONE (1) TABLET (10 MG TOTAL) BY MOUTH NIGHTLY., Disp: 90 tablet, Rfl: 3  •  niacin (ENDUR-ACIN) 500 mg CR tablet, Take 2 tablets (1,000 mg total) by mouth nightly., Disp: 180 tablet, Rfl: 3  •  rosuvastatin (CRESTOR) 5 mg tablet, Take 1 tablet (5 mg total) by mouth 3 (three) times a week (Mon, Wed, Fri)., Disp: 39 tablet, Rfl: 3  •  turmeric-turmeric root extract 450-50 mg capsule, Take by mouth 2 (two)  times a day., Disp: , Rfl:   •  verapamiL (VERELAN) 240 mg 24 hr capsule, Take 1 capsule (240 mg total) by mouth nightly., Disp: 90 capsule, Rfl: 3  •  verapamiL 40 mg tablet, TAKE ONE TABLET BY MOUTH ONCE DAILY WHEN NEEDED, Disp: 90 tablet, Rfl: 3      BP Readings from Last 3 Encounters:   10/04/22 122/74   09/29/22 100/78   07/01/22 118/60       Recent Lab results:  Lab Results   Component Value Date    CHOL 185 05/27/2022   ,   Lab Results   Component Value Date    HDL 79 05/27/2022   ,   Lab Results   Component Value Date    LDLCALC 98 05/27/2022   ,   Lab Results   Component Value Date    TRIG 41 05/27/2022        Lab Results   Component Value Date    GLUCOSE 90 05/27/2022   , No results found for: HGBA1C      Lab Results   Component Value Date    CREATININE 0.8 05/27/2022       Lab Results   Component Value Date    TSH 4.46 05/27/2022

## 2023-06-13 RX ORDER — EZETIMIBE 10 MG/1
TABLET ORAL
Qty: 39 TABLET | Refills: 3 | Status: SHIPPED | OUTPATIENT
Start: 2023-06-13 | End: 2024-06-27

## 2023-06-13 RX ORDER — FAMOTIDINE 20 MG/1
TABLET, FILM COATED ORAL
Qty: 90 TABLET | Refills: 3 | Status: SHIPPED | OUTPATIENT
Start: 2023-06-13 | End: 2024-04-30 | Stop reason: SDUPTHER

## 2023-06-23 NOTE — PROGRESS NOTES
[facility]  2023    Massiel West MD  [address]  [place] [address]    Re:  KELLY MEEKS  :  1950      Dear Massiel:    It was my pleasure to see your patient, Kelly Meeks, in the [facility] today.  As you know, we follow her for polygenic hypercholesterolemia, elevated LP(a), statin intolerance and mild aortic valvular sclerosis.    Clinically, she has been doing well.  She denies any chest pain, shortness of breath or palpitations.    As you know, she has undergone previous cardiac workup.  Her coronary calcium score was 0 in May 2016.  She did have a moderate amount of calcification involving in the thoracic aorta as well as the carotid distribution.  Given her family history of a father who had a myocardial infarction in his 30's and a mother who had a stroke at age 72, we elected to begin primary prevention therapy.    Her initial lipid panel revealed a total cholesterol of 228, , HDL 62 and triglycerides 102.  This is consistent with polygenic hypercholesterolemia.  She initiated on combination therapy with atorvastatin 10 mg alternating with Zetia 10 mg.  Her total cholesterol is down to 208, , apoB 99, LDL-P 1197, a reasonable response to this therapy.  Of note, her LP(a) was elevated at 420.  This prompted a discussion and we will begin niacin with Endur-Acin 500 mg at the evening meal.    Over the past year the patient has been on the keto diet.  She is markedly increased her saturated fats.  Her repeat lipid panel reflects this increased saturated fat intake.  Total cholesterol 218  HDL 73 triglycerides 73 APO B 121 and LDL P 1568.  We had a lengthy discussion  concerning cutting back on her saturated fat intake.  She needs to follow a low carbohydrate low saturated fat Mediterranean diet and increase her exercise program.      We recommended that she undergo a repeat coronary calcium score.  Her repeat calcium score in April  2019 was 0.  She remains in a very low low risk quartile.  We discussed her prevention program in detail.  Her repeat lipid panel revealed a total cholesterol 227  HDL 75 and triglycerides 70.  We both agreed to switch her from Lipitor to Crestor at 5 mg Monday Wednesday Friday.  We also agreed to increase her Endur-Acin 1000 mg the evening meal.      We reviewed her lipid panel from May 2022.  Her total cholesterol was 185 LDL 98 HDL 79 and triglycerides 41.  Certainly an excellent response to the use of Crestor 5 mg alternating with Zetia 10 mg and Endur-Acin 1000 mg.    She does carry history of elevated LP(a) at 420 nmol/L.  Eventually she will be a candidate for the new injectable medication for LP(a) once it is available in the clinic.  Her echocardiogram 2023 demonstrates mild to moderate aortic valvular stenosis..    She noted chest pain in 2021.  She underwent a stress echo in 2021.  Her stress echo was negative for myocardial ischemia or scar.  Her echo demonstrated that she has mild aortic valvular stenosis.  Her repeat echocardiogram performed in the office June 2023 demonstrates mild to moderate aortic valvular stenosis with a peak velocity of 2.8 m/s and mean gradient of 17 mmHg.    Today we discussed her most recent lipid panel June 2023.  Her total cholesterol was 235   triglycerides 69.  She has been following the Dr. Garzon diet.  This is a low lectin diet.  Today we both agreed to increase her Crestor to 5 mg 4 days weekly and continue her Zetia 10 mg 3 times weekly.  Our goal being LDL cholesterol below 100.      PAST MEDICAL HISTORY:    Coronary calcium score 0, May 2016.  2019.  Stress echo in 2021 was a normal study.    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 (Patient taking differently: Take 10 mg by mouth nightly as needed.), Disp: 90 tablet, Rfl: 1  •  cholecalciferol, vitamin D3, (VITAMIN D3) 2,000 unit tablet, Take by mouth daily., Disp: , Rfl:   •  ezetimibe (ZETIA) 10 mg tablet, TAKE ONE (1) TABLET (10 MG TOTAL) BY MOUTH THREE (3) (THREE) TIMES A WEEK (TUE, THU, SAT)., Disp: 39 tablet, Rfl: 3  •  famotidine (PEPCID) 20 mg tablet, TAKE ONE TABLET BY MOUTH ONCE DAILY, Disp: 90 tablet, Rfl: 3  •  fluticasone HFA (FLOVENT HFA) 110 mcg/actuation inhaler, Inhale 1 puff 2 (two) times a day. Rinse mouth after use, Disp: 12 g, Rfl: 5  •  levothyroxine (SYNTHROID) 100 mcg tablet, TAKE ONE TABLET BY MOUTH ONCE DAILY, Disp: 90 tablet, Rfl: 3  •  montelukast (SINGULAIR) 10 mg tablet, TAKE ONE (1) TABLET (10 MG TOTAL) BY MOUTH NIGHTLY., Disp: 90 tablet, Rfl: 3  •  niacin (ENDUR-ACIN) 500 mg CR tablet, Take 2 tablets (1,000 mg total) by mouth nightly., Disp: 180 tablet, Rfl: 3  •  rosuvastatin (CRESTOR) 5 mg tablet, Take 1 tablet (5 mg total) by mouth 4 (four) times a week (Mon, Wed, Fri, Sat)., Disp: 39 tablet, Rfl: 3  •  verapamiL (VERELAN) 240 mg 24 hr capsule, Take 1 capsule (240 mg total) by mouth nightly., Disp: 90 capsule, Rfl: 3  •  verapamiL 40 mg tablet, TAKE ONE TABLET BY MOUTH ONCE DAILY WHEN NEEDED, Disp: 90 tablet, Rfl: 3  •  DOCOSAHEXANOIC ACID/EPA (FISH OIL ORAL), take  atotal 3,000  daily, Disp: ,  Rfl:     SUPPLEMENTS:  Omega-3 fish oil, vitamin-D, coenzyme-Q10, Reservitol, curcumin, per Dr. Adrian Yang's recommendations, Endur-Acin 500 mg at dinner.    ALLERGIES:  High-dose statins.  Lipitor 20 mg daily caused significant myalgias.    FAMILY HISTORY:  Father had multiple myocardial infarctions in his 30's.  Mother with possible coronary artery disease and stroke.  Brother with hypertension.    SOCIAL HISTORY:  The patient lives with her adopted daughter.  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  Occasional glass of wine.  She quit smoking 15 years ago.    REVIEW OF SYSTEMS:  The patient's palpitations have been well controlled with the use of Verelan.  She follows with Dr. Ofelia Muse.  She has had difficulty with taking high-dose statins as they lead to symptoms of myalgias and leg pain.  She has a history of asthma and allergies.    PHYSICAL EXAMINATION:  The patient is a middle-aged female in no acute distress.    Vitals:    06/28/23 1304   BP: 128/70   Pulse: 76   Resp: 18   SpO2: 96%     Wt Readings from Last 3 Encounters:   06/28/23 66.7 kg (147 lb)   07/01/22 66.9 kg (147 lb 6.4 oz)   06/01/22 64.4 kg (142 lb)     HEENT:  Unremarkable.  No xanthelasma or arcus.  Neck:  Supple, no JVD.  Lungs:  Clear to auscultation and percussion.  Cardiac:  Regular rate and rhythm without murmur or gallop.  Abdomen:  Soft, bowel sounds present, no organomegaly.  Extremities:  No edema, pulses intact.  Skin:  Warm and dry.  Neuro:  Alert and oriented X 3.    LABORATORY DATA:      Coronary score:  0, 2016, 0 in 2019 , thoracic aortic calcification.      Carotid ultrasound 2019:  Right Carotid Mild plaque  Normal velocities  Antegrade vertebral   Left Carotid Mild plaque  Normal velocities  Antegrade vertebral     Stress echocardiogram April 2021:  · An exercise stress test was performed following the Richard protocol. The patient reported no symptoms and no angina  during the stress test.  · There was no ST segment deviation noted during stress. Arrhythmias noted during stress: rare PVCs .There were no arrhythmias during recovery.  · Stress ECG does not meet criteria for ischemia.  · Post-stress echocardiogram does not meet criteria for ischemia. All ventricular walls become hyperdynamic and the ejection fraction improves.  · Normal ventricle size. Normal wall thickness. Preserved systolic function. Estimated EF 65%. Normal septal motion. No regional wall motion abnormalities. Grade I diastolic dysfunction. Diastolic inflow pattern consistent with impaired relaxation.  · Tricuspid aortic valve. Calcification of the aortic valve leaflets. No evidence of aortic valve regurgitation. . Mild aortic valve stenosis. AV mean gradient = 12.00 mmHg.  · Stress echocardiogram does not meet criteria for myocardial ischemia.    Echocardiogram June 2023:  •  Left Ventricle: Normal ventricle size. Normal wall thickness. Estimated EF 65-70%. No regional wall motion abnormalities. Grade I diastolic dysfunction.  •  Aortic Valve: Tricuspid valve.  Calcification present with reduced excursion. Trace regurgitation. Mild to moderate stenosis. The peak aortic velocity was measured in the apical view. Peak velocity = 2.79 m/s. Mean gradient = 17.00 mmHg. Calculated area by cont eq = 1.43 cm2. Calculated dimensionless index = 0.50.  •  Aorta: Aortic root normal. Sinuses of Valsalva normal-sized. Ascending aorta normal-sized.  •  Mitral Valve: Normal leaflet structure. Mild regurgitation.  •  Left Atrium: Mildly dilated atrium.  •  Right Atrium: Mildly dilated atrium.  •  Tricuspid Valve: Normal structure. Mild regurgitation. Estimated RVSP = 31 mmHg.  •  Right Ventricle: Normal ventricle size. Normal systolic function.  •  Pulmonic Valve: Normal structure. Trace regurgitation.  •  IVC/SVC: Inferior vena cava is dilated (>2.1cm). Inferior vena cava demonstrates normal respiratory collapse.  •   Pericardium: Normal structure.     Advanced lipid panel March 2019:  Total cholesterol 218  HDL 73 triglycerides 73 APO B 121.  LP(a) 473 nmol/L  Inflammation panel: Normal  Endothelial function panel: Abnormal  Metabolic panel: Vitamin D 69   Sterol panel: Hyper absorber of cholesterol.  Normal synthesizer of cholesterol.  Diabetes panel: Insulin resistance  Omega-3 index: 9.1    Lipid panels:  Component      Latest Ref Rng 10/17/2019 5/27/2022 6/27/2023   Triglycerides      <150 mg/dL 70  41  69    Cholesterol      <=200 mg/dL 227 (H)  185  235 (H)    HDL      <55 mg/dL 75  79  102 (H)    LDL Calculated      <=100 mg/dL 138 (H)  98  119 (H)    Non-HDL, Calculated      mg/dL 152  106  133    RISK      <=5.0  3.0  2.3  2.3         IMPRESSIONS/RECOMMENDATIONS:  1. Cardiovascular risk assessment.  The patient's coronary calcium score in 2016 and 2019 remains 0 which places her in a low risk category.   2. Thoracic aortic and carotid arterial calcification.  The patient is at risk for progressive atherosclerosis.   3. Polygenic hypercholesterolemia.  Continue Crestor 5 mg Monday Wednesday Friday, Saturday and  Zetia 10 mg at Tuesday Thursday and Saturday .    4. Elevated LP(a).  Continue Endur-Acin.  5. Vitamin-D deficiency.  Continue vitamin-D supplementation.  6. Statin intolerance.  7. Hyperabsorber of cholesterol.  Continue Zetia.  8. Hypothyroidism.  Continue Synthroid.  9.         Paroxysmal supraventricular tachycardia.  The patient is followed by Dr. Ofelia Muse.  She will continue verapamil.  10.       Mild to moderate aortic valvular stenosis.  The patient's echocardiogram suggest moderate aortic valvular stenosis with a mean gradient of 17 mmHg.    Summary: Kelly is demonstrating cardiovascular stability.     Her recent stress echo 2021 performed in the office demonstrated normal blood pressure and heart rhythm response to exercise.  Study was negative for ischemia.  She had no chest pain.  The  study did demonstrate she has mild aortic valvular stenosis.      Repeat echocardiogram performed  June 2023 demonstrates mild to moderate aortic valvular stenosis with a mean gradient of 17 mmHg.  Her peak velocity is 2.8 m/s.    She is now followed by Dr. Muse for her PSVT.  She will continue verapamil.      Her carotid ultrasound demonstrates bilateral carotid arteries sclerosis.  We have initiated lipid-lowering therapy.  She will continue with Crestor 5 mg and Zetia 10 mg on the schedule noted above..  She will continue with Endur-Acin for her elevated LP(a).    She is now following a Dr. Garzon low lectin diet.    This is a 30-minute patient encounter with greater than 50% time spent in care coordination counseling.      Sincerely,    Nilson Saucedo MD  6/28/2023

## 2023-06-26 ENCOUNTER — TELEPHONE (OUTPATIENT)
Dept: SCHEDULING | Facility: CLINIC | Age: 73
End: 2023-06-26
Payer: MEDICARE

## 2023-06-26 DIAGNOSIS — E78.5 DYSLIPIDEMIA: Primary | ICD-10-CM

## 2023-06-26 DIAGNOSIS — I65.23 ARTERIOSCLEROSIS OF BOTH CAROTID ARTERIES: ICD-10-CM

## 2023-06-26 NOTE — TELEPHONE ENCOUNTER
Pt's labs ordered by Dr. Saucedo last  have .     Please reorder labs and assign to [facility].     Pt will complete at [facility] tomorrow morning.     Pt can be reached at [phone] if needed.    Ty.

## 2023-06-27 ENCOUNTER — APPOINTMENT (OUTPATIENT)
Dept: LAB | Facility: HOSPITAL | Age: 73
End: 2023-06-27
Attending: INTERNAL MEDICINE
Payer: MEDICARE

## 2023-06-27 DIAGNOSIS — E78.5 DYSLIPIDEMIA: ICD-10-CM

## 2023-06-27 DIAGNOSIS — I65.23 ARTERIOSCLEROSIS OF BOTH CAROTID ARTERIES: ICD-10-CM

## 2023-06-27 LAB
ALBUMIN SERPL-MCNC: 4.2 G/DL (ref 3.5–5.7)
ALP SERPL-CCNC: 45 IU/L (ref 34–104)
ALT SERPL-CCNC: 38 IU/L (ref 7–52)
ANION GAP SERPL CALC-SCNC: 7 MEQ/L (ref 3–15)
AST SERPL-CCNC: 32 IU/L (ref 13–39)
BILIRUB SERPL-MCNC: 0.5 MG/DL (ref 0.3–1)
BUN SERPL-MCNC: 16 MG/DL (ref 7–25)
CALCIUM SERPL-MCNC: 9.1 MG/DL (ref 8.6–10.3)
CHLORIDE SERPL-SCNC: 106 MEQ/L (ref 98–107)
CO2 SERPL-SCNC: 28 MEQ/L (ref 21–31)
CREAT SERPL-MCNC: 0.8 MG/DL (ref 0.6–1.2)
GFR SERPL CREATININE-BSD FRML MDRD: >60 ML/MIN/1.73M*2
GLUCOSE SERPL-MCNC: 88 MG/DL (ref 70–99)
POTASSIUM SERPL-SCNC: 4.1 MEQ/L (ref 3.5–5.1)
PROT SERPL-MCNC: 6.8 G/DL (ref 6.4–8.9)
SODIUM SERPL-SCNC: 141 MEQ/L (ref 136–145)

## 2023-06-27 PROCEDURE — 80053 COMPREHEN METABOLIC PANEL: CPT

## 2023-06-27 PROCEDURE — 80061 LIPID PANEL: CPT

## 2023-06-27 PROCEDURE — 36415 COLL VENOUS BLD VENIPUNCTURE: CPT

## 2023-06-28 ENCOUNTER — HOSPITAL ENCOUNTER (OUTPATIENT)
Dept: CARDIOLOGY | Facility: CLINIC | Age: 73
Discharge: HOME | End: 2023-06-28
Attending: INTERNAL MEDICINE
Payer: MEDICARE

## 2023-06-28 ENCOUNTER — OFFICE VISIT (OUTPATIENT)
Dept: CARDIOLOGY | Facility: CLINIC | Age: 73
End: 2023-06-28
Payer: MEDICARE

## 2023-06-28 VITALS
HEIGHT: 61 IN | OXYGEN SATURATION: 96 % | BODY MASS INDEX: 27.75 KG/M2 | WEIGHT: 147 LBS | DIASTOLIC BLOOD PRESSURE: 70 MMHG | RESPIRATION RATE: 18 BRPM | HEART RATE: 76 BPM | SYSTOLIC BLOOD PRESSURE: 128 MMHG

## 2023-06-28 VITALS — DIASTOLIC BLOOD PRESSURE: 70 MMHG | BODY MASS INDEX: 26.96 KG/M2 | HEIGHT: 62 IN | SYSTOLIC BLOOD PRESSURE: 128 MMHG

## 2023-06-28 DIAGNOSIS — I65.23 ARTERIOSCLEROSIS OF BOTH CAROTID ARTERIES: ICD-10-CM

## 2023-06-28 DIAGNOSIS — I47.10 SUPRAVENTRICULAR TACHYCARDIA (CMS/HCC): Primary | ICD-10-CM

## 2023-06-28 DIAGNOSIS — I47.10 SUPRAVENTRICULAR TACHYCARDIA (CMS/HCC): ICD-10-CM

## 2023-06-28 DIAGNOSIS — E78.5 DYSLIPIDEMIA: ICD-10-CM

## 2023-06-28 DIAGNOSIS — E78.41 ELEVATED LP(A): ICD-10-CM

## 2023-06-28 LAB
AORTIC ROOT ANNULUS: 2.7 CM
AORTIC VALVE MEAN VELOCITY: 1.94 M/S
AORTIC VALVE VELOCITY TIME INTEGRAL: 63.9 CM
ASCENDING AORTA: 3.2 CM
AV MEAN GRADIENT: 17 MMHG
AV PEAK GRADIENT: 31 MMHG
AV PEAK VELOCITY-S: 2.79 M/S
AV VALVE AREA: 1.24 CM2
AV VELOCITY RATIO: 0.5
AVA (VTI): 1.43 CM2
DOP CALC LVOT STROKE VOLUME: 91.25 CM3
E WAVE DECELERATION TIME: 278 MS
E/A RATIO: 0.9
E/E' RATIO: 13.6
E/LAT E' RATIO: 11.8
EDV (BP): 54.4 CM3
EF (A4C): 71.3 %
EF A2C: 67.6 %
EJECTION FRACTION: 69.5 %
EST RIGHT VENT SYSTOLIC PRESSURE BY TRICUSPID REGURGITATION JET: 31 MMHG
ESV (BP): 16.6 CM3
FRACTIONAL SHORTENING: 38.12 %
INTERVENTRICULAR SEPTUM: 0.99 CM
LA ESV (BP): 62.9 CM3
LA/AORTA RATIO: 1.3
LAAS-AP2: 19.9 CM2
LAAS-AP4: 20.2 CM2
LAD 2D: 3.5 CM
LALD A4C: 5.08 CM
LALD A4C: 5.68 CM
LAV-S: 61.9 CM3
LEFT ATRIUM VOLUME: 57.2 CM3
LEFT INTERNAL DIMENSION IN SYSTOLE: 2.37 CM
LEFT VENTRICLE DIASTOLIC VOLUME: 60.6 CM3
LEFT VENTRICLE SYSTOLIC VOLUME: 17.4 CM3
LEFT VENTRICULAR INTERNAL DIMENSION IN DIASTOLE: 3.83 CM
LEFT VENTRICULAR POSTERIOR WALL IN END DIASTOLE: 1 CM
LV DIASTOLIC VOLUME: 48.2 CM3
LV ESV (APICAL 2 CHAMBER): 15.6 CM3
LVAD-AP2: 20.4 CM2
LVAD-AP4: 22.9 CM2
LVAS-AP2: 9.97 CM2
LVAS-AP4: 10.4 CM2
LVLD-AP2: 6.84 CM
LVLD-AP4: 6.98 CM
LVLS-AP2: 5.5 CM
LVLS-AP4: 5.33 CM
LVOT 2D: 1.9 CM
LVOT A: 2.84 CM2
LVOT MG: 5 MMHG
LVOT MV: 1.08 M/S
LVOT PEAK VELOCITY: 1.55 M/S
LVOT PG: 10 MMHG
LVOT VTI: 32.2 CM
MV E'TISSUE VEL-LAT: 0.08 M/S
MV E'TISSUE VEL-MED: 0.07 M/S
MV PEAK A VEL: 1.14 M/S
MV PEAK E VEL: 0.99 M/S
POSTERIOR WALL: 1 CM
PULMONARY REGURGITATION LATE DIASTOLIC VELOCITY: 1.23 M/S
PV PEAK GRADIENT: 7 MMHG
PV PV: 1.3 M/S
RAP: 5 MMHG
RVOT VMAX: 1.16 M/S
RVOT VTI: 26.4 CM
SEPTAL TISSUE DOPPLER FREE WALL LATE DIA VELOCITY (APICAL 4 CHAMBER VIEW): 0.15 M/S
TR MAX PG: 25.81 MMHG
TRICUSPID VALVE PEAK REGURGITATION VELOCITY: 2.54 M/S

## 2023-06-28 PROCEDURE — 93000 ELECTROCARDIOGRAM COMPLETE: CPT | Performed by: INTERNAL MEDICINE

## 2023-06-28 PROCEDURE — 99214 OFFICE O/P EST MOD 30 MIN: CPT | Performed by: INTERNAL MEDICINE

## 2023-06-28 PROCEDURE — 93306 TTE W/DOPPLER COMPLETE: CPT | Performed by: INTERNAL MEDICINE

## 2023-06-28 RX ORDER — ROSUVASTATIN CALCIUM 5 MG/1
5 TABLET, COATED ORAL
Qty: 39 TABLET | Refills: 3 | Status: SHIPPED | OUTPATIENT
Start: 2023-06-28 | End: 2023-08-23 | Stop reason: SDUPTHER

## 2023-06-28 NOTE — LETTER
2023     Massiel West MD  [address]  [address]  [place] [address]    Patient: Kelly Meeks  YOB: 1950  Date of Visit: 2023      Dear Dr. West:    Thank you for referring Kelly Meeks to me for evaluation. Below are my notes for this consultation.    If you have questions, please do not hesitate to call me. I look forward to following your patient along with you.         Sincerely,        Nilson Saucedo MD        CC: No Recipients    Nilson Saucedo MD  2023  3:21 PM  Signed  [facility]  2023    Massiel West MD  [address]  [place] [address]    Re:  KELLY MEEKS  :  1950      Dear Massiel:    It was my pleasure to see your patient, Kelly Meeks, in the [facility] today.  As you know, we follow her for polygenic hypercholesterolemia, elevated LP(a), statin intolerance and mild aortic valvular sclerosis.    Clinically, she has been doing well.  She denies any chest pain, shortness of breath or palpitations.    As you know, she has undergone previous cardiac workup.  Her coronary calcium score was 0 in May 2016.  She did have a moderate amount of calcification involving in the thoracic aorta as well as the carotid distribution.  Given her family history of a father who had a myocardial infarction in his 30's and a mother who had a stroke at age 72, we elected to begin primary prevention therapy.    Her initial lipid panel revealed a total cholesterol of 228, , HDL 62 and triglycerides 102.  This is consistent with polygenic hypercholesterolemia.  She initiated on combination therapy with atorvastatin 10 mg alternating with Zetia 10 mg.  Her total cholesterol is down to 208, , apoB 99, LDL-P 1197, a reasonable response to this therapy.  Of note, her LP(a) was elevated at 420.  This prompted a discussion and we will begin niacin with Endur-Acin 500 mg at the evening meal.    Over the past  year the patient has been on the keto diet.  She is markedly increased her saturated fats.  Her repeat lipid panel reflects this increased saturated fat intake.  Total cholesterol 218  HDL 73 triglycerides 73 APO B 121 and LDL P 1568.  We had a lengthy discussion  concerning cutting back on her saturated fat intake.  She needs to follow a low carbohydrate low saturated fat Mediterranean diet and increase her exercise program.      We recommended that she undergo a repeat coronary calcium score.  Her repeat calcium score in April 2019 was 0.  She remains in a very low low risk quartile.  We discussed her prevention program in detail.  Her repeat lipid panel revealed a total cholesterol 227  HDL 75 and triglycerides 70.  We both agreed to switch her from Lipitor to Crestor at 5 mg Monday Wednesday Friday.  We also agreed to increase her Endur-Acin 1000 mg the evening meal.      We reviewed her lipid panel from May 2022.  Her total cholesterol was 185 LDL 98 HDL 79 and triglycerides 41.  Certainly an excellent response to the use of Crestor 5 mg alternating with Zetia 10 mg and Endur-Acin 1000 mg.    She does carry history of elevated LP(a) at 420 nmol/L.  Eventually she will be a candidate for the new injectable medication for LP(a) once it is available in the clinic.  Her echocardiogram 2023 demonstrates mild to moderate aortic valvular stenosis..    She noted chest pain in 2021.  She underwent a stress echo in 2021.  Her stress echo was negative for myocardial ischemia or scar.  Her echo demonstrated that she has mild aortic valvular stenosis.  Her repeat echocardiogram performed in the office June 2023 demonstrates mild to moderate aortic valvular stenosis with a peak velocity of 2.8 m/s and mean gradient of 17 mmHg.    Today we discussed her most recent lipid panel June 2023.  Her total cholesterol was 235   triglycerides 69.  She has been following the Dr. Garzon diet.  This is a low  lectin diet.  Today we both agreed to increase her Crestor to 5 mg 4 days weekly and continue her Zetia 10 mg 3 times weekly.  Our goal being LDL cholesterol below 100.      PAST MEDICAL HISTORY:    Coronary calcium score 0, May 2016.  2019.  Stress echo in 2021 was a normal study.    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 (Patient taking differently: Take 10 mg by mouth nightly as needed.), Disp: 90 tablet, Rfl: 1  •  cholecalciferol, vitamin D3, (VITAMIN D3) 2,000 unit tablet, Take by mouth daily., Disp: , Rfl:   •  ezetimibe (ZETIA) 10 mg tablet, TAKE ONE (1) TABLET (10 MG TOTAL) BY MOUTH THREE (3) (THREE) TIMES A WEEK (TUE, THU, SAT)., Disp: 39 tablet, Rfl: 3  •  famotidine (PEPCID) 20 mg tablet, TAKE ONE TABLET BY MOUTH ONCE DAILY, Disp: 90 tablet, Rfl: 3  •  fluticasone HFA (FLOVENT HFA) 110 mcg/actuation inhaler, Inhale 1 puff 2 (two) times a day. Rinse mouth after use, Disp: 12 g, Rfl: 5  •  levothyroxine (SYNTHROID) 100 mcg tablet, TAKE ONE TABLET BY MOUTH ONCE DAILY, Disp: 90 tablet, Rfl: 3  •  montelukast (SINGULAIR) 10 mg tablet, TAKE ONE (1) TABLET (10 MG TOTAL) BY MOUTH NIGHTLY., Disp: 90 tablet, Rfl: 3  •  niacin (ENDUR-ACIN) 500 mg CR tablet,  Take 2 tablets (1,000 mg total) by mouth nightly., Disp: 180 tablet, Rfl: 3  •  rosuvastatin (CRESTOR) 5 mg tablet, Take 1 tablet (5 mg total) by mouth 4 (four) times a week (Mon, Wed, Fri, Sat)., Disp: 39 tablet, Rfl: 3  •  verapamiL (VERELAN) 240 mg 24 hr capsule, Take 1 capsule (240 mg total) by mouth nightly., Disp: 90 capsule, Rfl: 3  •  verapamiL 40 mg tablet, TAKE ONE TABLET BY MOUTH ONCE DAILY WHEN NEEDED, Disp: 90 tablet, Rfl: 3  •  DOCOSAHEXANOIC ACID/EPA (FISH OIL ORAL), take  atotal 3,000  daily, Disp: , Rfl:     SUPPLEMENTS:  Omega-3 fish oil, vitamin-D, coenzyme-Q10, Reservitol, curcumin, per Dr. Adrian aYng's recommendations, Endur-Acin 500 mg at dinner.    ALLERGIES:  High-dose statins.  Lipitor 20 mg daily caused significant myalgias.    FAMILY HISTORY:  Father had multiple myocardial infarctions in his 30's.  Mother with possible coronary artery disease and stroke.  Brother with hypertension.    SOCIAL HISTORY:  The patient lives with her adopted daughter.  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  Occasional glass of wine.  She quit smoking 15 years ago.    REVIEW OF SYSTEMS:  The patient's palpitations have been well controlled with the use of Verelan.  She follows with Dr. Ofelia Muse.  She has had difficulty with taking high-dose statins as they lead to symptoms of myalgias and leg pain.  She has a history of asthma and allergies.    PHYSICAL EXAMINATION:  The patient is a middle-aged female in no acute distress.    Vitals:    06/28/23 1304   BP: 128/70   Pulse: 76   Resp: 18   SpO2: 96%     Wt Readings from Last 3 Encounters:   06/28/23 66.7 kg (147 lb)   07/01/22 66.9 kg (147 lb 6.4 oz)   06/01/22 64.4 kg (142 lb)     HEENT:  Unremarkable.  No xanthelasma or arcus.  Neck:  Supple, no JVD.  Lungs:  Clear to auscultation and percussion.  Cardiac:  Regular rate and rhythm without murmur or gallop.  Abdomen:  Soft, bowel sounds present, no  organomegaly.  Extremities:  No edema, pulses intact.  Skin:  Warm and dry.  Neuro:  Alert and oriented X 3.    LABORATORY DATA:      Coronary score:  0, 2016, 0 in 2019 , thoracic aortic calcification.      Carotid ultrasound 2019:  Right Carotid Mild plaque  Normal velocities  Antegrade vertebral   Left Carotid Mild plaque  Normal velocities  Antegrade vertebral     Stress echocardiogram April 2021:  · An exercise stress test was performed following the Richard protocol. The patient reported no symptoms and no angina during the stress test.  · There was no ST segment deviation noted during stress. Arrhythmias noted during stress: rare PVCs .There were no arrhythmias during recovery.  · Stress ECG does not meet criteria for ischemia.  · Post-stress echocardiogram does not meet criteria for ischemia. All ventricular walls become hyperdynamic and the ejection fraction improves.  · Normal ventricle size. Normal wall thickness. Preserved systolic function. Estimated EF 65%. Normal septal motion. No regional wall motion abnormalities. Grade I diastolic dysfunction. Diastolic inflow pattern consistent with impaired relaxation.  · Tricuspid aortic valve. Calcification of the aortic valve leaflets. No evidence of aortic valve regurgitation. . Mild aortic valve stenosis. AV mean gradient = 12.00 mmHg.  · Stress echocardiogram does not meet criteria for myocardial ischemia.    Echocardiogram June 2023:  •  Left Ventricle: Normal ventricle size. Normal wall thickness. Estimated EF 65-70%. No regional wall motion abnormalities. Grade I diastolic dysfunction.  •  Aortic Valve: Tricuspid valve.  Calcification present with reduced excursion. Trace regurgitation. Mild to moderate stenosis. The peak aortic velocity was measured in the apical view. Peak velocity = 2.79 m/s. Mean gradient = 17.00 mmHg. Calculated area by cont eq = 1.43 cm2. Calculated dimensionless index = 0.50.  •  Aorta: Aortic root normal. Sinuses of Valsalva  normal-sized. Ascending aorta normal-sized.  •  Mitral Valve: Normal leaflet structure. Mild regurgitation.  •  Left Atrium: Mildly dilated atrium.  •  Right Atrium: Mildly dilated atrium.  •  Tricuspid Valve: Normal structure. Mild regurgitation. Estimated RVSP = 31 mmHg.  •  Right Ventricle: Normal ventricle size. Normal systolic function.  •  Pulmonic Valve: Normal structure. Trace regurgitation.  •  IVC/SVC: Inferior vena cava is dilated (>2.1cm). Inferior vena cava demonstrates normal respiratory collapse.  •  Pericardium: Normal structure.     Advanced lipid panel March 2019:  Total cholesterol 218  HDL 73 triglycerides 73 APO B 121.  LP(a) 473 nmol/L  Inflammation panel: Normal  Endothelial function panel: Abnormal  Metabolic panel: Vitamin D 69   Sterol panel: Hyper absorber of cholesterol.  Normal synthesizer of cholesterol.  Diabetes panel: Insulin resistance  Omega-3 index: 9.1    Lipid panels:  Component      Latest Ref Rng 10/17/2019 5/27/2022 6/27/2023   Triglycerides      <150 mg/dL 70  41  69    Cholesterol      <=200 mg/dL 227 (H)  185  235 (H)    HDL      <55 mg/dL 75  79  102 (H)    LDL Calculated      <=100 mg/dL 138 (H)  98  119 (H)    Non-HDL, Calculated      mg/dL 152  106  133    RISK      <=5.0  3.0  2.3  2.3         IMPRESSIONS/RECOMMENDATIONS:  1. Cardiovascular risk assessment.  The patient's coronary calcium score in 2016 and 2019 remains 0 which places her in a low risk category.   2. Thoracic aortic and carotid arterial calcification.  The patient is at risk for progressive atherosclerosis.   3. Polygenic hypercholesterolemia.  Continue Crestor 5 mg Monday Wednesday Friday, Saturday and  Zetia 10 mg at Tuesday Thursday and Saturday .    4. Elevated LP(a).  Continue Endur-Acin.  5. Vitamin-D deficiency.  Continue vitamin-D supplementation.  6. Statin intolerance.  7. Hyperabsorber of cholesterol.  Continue Zetia.  8. Hypothyroidism.  Continue Synthroid.  9.         Paroxysmal  supraventricular tachycardia.  The patient is followed by Dr. Ofelia Muse.  She will continue verapamil.  10.       Mild to moderate aortic valvular stenosis.  The patient's echocardiogram suggest moderate aortic valvular stenosis with a mean gradient of 17 mmHg.    Summary: Kelly is demonstrating cardiovascular stability.     Her recent stress echo 2021 performed in the office demonstrated normal blood pressure and heart rhythm response to exercise.  Study was negative for ischemia.  She had no chest pain.  The study did demonstrate she has mild aortic valvular stenosis.      Repeat echocardiogram performed  June 2023 demonstrates mild to moderate aortic valvular stenosis with a mean gradient of 17 mmHg.  Her peak velocity is 2.8 m/s.    She is now followed by Dr. Muse for her PSVT.  She will continue verapamil.      Her carotid ultrasound demonstrates bilateral carotid arteries sclerosis.  We have initiated lipid-lowering therapy.  She will continue with Crestor 5 mg and Zetia 10 mg on the schedule noted above..  She will continue with Endur-Acin for her elevated LP(a).    She is now following a Dr. Garzon low lectin diet.    This is a 30-minute patient encounter with greater than 50% time spent in care coordination counseling.      Sincerely,    Nilson Saucedo MD  6/28/2023

## 2023-08-01 LAB
CHOLEST SERPL-MCNC: 235 MG/DL
HDLC SERPL-MCNC: 102 MG/DL
HDLC SERPL: 2.3 {RATIO}
LDLC SERPL CALC-MCNC: 119 MG/DL
NONHDLC SERPL-MCNC: 133 MG/DL
TRIGL SERPL-MCNC: 69 MG/DL

## 2023-08-23 RX ORDER — ROSUVASTATIN CALCIUM 5 MG/1
5 TABLET, COATED ORAL
Qty: 52 TABLET | Refills: 3 | Status: SHIPPED | OUTPATIENT
Start: 2023-08-23 | End: 2024-11-12

## 2023-08-23 NOTE — TELEPHONE ENCOUNTER
Medicine Refill Request [facility]    Name of Patient: Kelly Meeks    Caller's name/Relationship: Kelly Meeks  Callback number: [phone]    Medication Name, Dosage, Supply: rosuvastatin (CRESTOR) 5 mg tablet  Pt now taking 4 times weekly     Quantity left: 1 pill    Pharmacy: [facility]    Last Office Visit: 06/28  Last Consult Visit: Visit date not found  Last Telemedicine Visit: Visit date not found    Next Appointment: Visit date not found      Current Outpatient Medications:   •  albuterol HFA (VENTOLIN HFA) 90 mcg/actuation inhaler, INHALE TWO (2) PUFFS BY MOUTH EVERY SIX (6) HOURS, Disp: 1 each, Rfl: 3  •  cetirizine (ZyrTEC) 10 mg tablet, Take 1 tablet (10 mg total) by mouth nightly. (Patient taking differently: Take 10 mg by mouth nightly as needed.), Disp: 90 tablet, Rfl: 1  •  cholecalciferol, vitamin D3, (VITAMIN D3) 2,000 unit tablet, Take by mouth daily., Disp: , Rfl:   •  DOCOSAHEXANOIC ACID/EPA (FISH OIL ORAL), take  atotal 3,000  daily, Disp: , Rfl:   •  ezetimibe (ZETIA) 10 mg tablet, TAKE ONE (1) TABLET (10 MG TOTAL) BY MOUTH THREE (3) (THREE) TIMES A WEEK (TUE, THU, SAT)., Disp: 39 tablet, Rfl: 3  •  famotidine (PEPCID) 20 mg tablet, TAKE ONE TABLET BY MOUTH ONCE DAILY, Disp: 90 tablet, Rfl: 3  •  fluticasone HFA (FLOVENT HFA) 110 mcg/actuation inhaler, Inhale 1 puff 2 (two) times a day. Rinse mouth after use, Disp: 12 g, Rfl: 5  •  levothyroxine (SYNTHROID) 100 mcg tablet, TAKE ONE TABLET BY MOUTH ONCE DAILY, Disp: 90 tablet, Rfl: 3  •  montelukast (SINGULAIR) 10 mg tablet, TAKE ONE (1) TABLET (10 MG TOTAL) BY MOUTH NIGHTLY., Disp: 90 tablet, Rfl: 3  •  niacin (ENDUR-ACIN) 500 mg CR tablet, Take 2 tablets (1,000 mg total) by mouth nightly., Disp: 180 tablet, Rfl: 3  •  rosuvastatin (CRESTOR) 5 mg tablet, Take 1 tablet (5 mg total) by mouth 4 (four) times a week (Mon, Wed, Fri, Sat)., Disp: 39 tablet, Rfl: 3  •  verapamiL (VERELAN) 240 mg 24 hr capsule, Take 1 capsule (240 mg total) by  mouth nightly., Disp: 90 capsule, Rfl: 3  •  verapamiL 40 mg tablet, TAKE ONE TABLET BY MOUTH ONCE DAILY WHEN NEEDED, Disp: 90 tablet, Rfl: 3      BP Readings from Last 3 Encounters:   06/28/23 128/70   06/28/23 128/70   10/04/22 122/74       Recent Lab results:  Lab Results   Component Value Date    CHOL 235 (H) 06/27/2023   ,   Lab Results   Component Value Date     06/27/2023   ,   Lab Results   Component Value Date    LDLCALC 119 (H) 06/27/2023   ,   Lab Results   Component Value Date    TRIG 69 06/27/2023        Lab Results   Component Value Date    GLUCOSE 88 06/27/2023   , No results found for: HGBA1C      Lab Results   Component Value Date    CREATININE 0.8 06/27/2023       Lab Results   Component Value Date    TSH 4.46 05/27/2022

## 2023-09-14 ENCOUNTER — TELEPHONE (OUTPATIENT)
Dept: FAMILY MEDICINE | Facility: CLINIC | Age: 73
End: 2023-09-14

## 2023-09-14 DIAGNOSIS — Z12.31 SCREENING MAMMOGRAM FOR BREAST CANCER: Primary | ICD-10-CM

## 2023-09-14 RX ORDER — VERAPAMIL HYDROCHLORIDE 40 MG/1
TABLET ORAL
Qty: 90 TABLET | Refills: 3 | Status: SHIPPED | OUTPATIENT
Start: 2023-09-14 | End: 2024-09-20 | Stop reason: SDUPTHER

## 2023-09-14 NOTE — TELEPHONE ENCOUNTER
Medicine Refill Request    Last Office: 10/4/2022   Last Consult Visit: Visit date not found  Last Telemedicine Visit: 4/15/2021 Melonie Guy MD    Next Appointment: 12/12/2023      Current Outpatient Medications:     albuterol HFA (VENTOLIN HFA) 90 mcg/actuation inhaler, INHALE TWO (2) PUFFS BY MOUTH EVERY SIX (6) HOURS, Disp: 1 each, Rfl: 3    cetirizine (ZyrTEC) 10 mg tablet, Take 1 tablet (10 mg total) by mouth nightly. (Patient taking differently: Take 10 mg by mouth nightly as needed.), Disp: 90 tablet, Rfl: 1    cholecalciferol, vitamin D3, (VITAMIN D3) 2,000 unit tablet, Take by mouth daily., Disp: , Rfl:     DOCOSAHEXANOIC ACID/EPA (FISH OIL ORAL), take  atotal 3,000  daily, Disp: , Rfl:     ezetimibe (ZETIA) 10 mg tablet, TAKE ONE (1) TABLET (10 MG TOTAL) BY MOUTH THREE (3) (THREE) TIMES A WEEK (TUE, THU, SAT)., Disp: 39 tablet, Rfl: 3    famotidine (PEPCID) 20 mg tablet, TAKE ONE TABLET BY MOUTH ONCE DAILY, Disp: 90 tablet, Rfl: 3    fluticasone HFA (FLOVENT HFA) 110 mcg/actuation inhaler, Inhale 1 puff 2 (two) times a day. Rinse mouth after use, Disp: 12 g, Rfl: 5    levothyroxine (SYNTHROID) 100 mcg tablet, TAKE ONE TABLET BY MOUTH ONCE DAILY, Disp: 90 tablet, Rfl: 3    montelukast (SINGULAIR) 10 mg tablet, TAKE ONE (1) TABLET (10 MG TOTAL) BY MOUTH NIGHTLY., Disp: 90 tablet, Rfl: 3    niacin (ENDUR-ACIN) 500 mg CR tablet, Take 2 tablets (1,000 mg total) by mouth nightly., Disp: 180 tablet, Rfl: 3    rosuvastatin (CRESTOR) 5 mg tablet, Take 1 tablet (5 mg total) by mouth 4 (four) times a week (Mon, Wed, Fri, Sat)., Disp: 52 tablet, Rfl: 3    verapamiL (VERELAN) 240 mg 24 hr capsule, Take 1 capsule (240 mg total) by mouth nightly., Disp: 90 capsule, Rfl: 3    verapamiL 40 mg tablet, TAKE ONE TABLET BY MOUTH ONCE DAILY WHEN NEEDED, Disp: 90 tablet, Rfl: 3      BP Readings from Last 3 Encounters:   06/28/23 128/70   06/28/23 128/70   10/04/22 122/74       Recent Lab results:  Lab Results    Component Value Date    CHOL 235 (H) 06/27/2023   ,   Lab Results   Component Value Date     06/27/2023   ,   Lab Results   Component Value Date    LDLCALC 119 (H) 06/27/2023   ,   Lab Results   Component Value Date    TRIG 69 06/27/2023        Lab Results   Component Value Date    GLUCOSE 88 06/27/2023   , No results found for: HGBA1C      Lab Results   Component Value Date    CREATININE 0.8 06/27/2023       Lab Results   Component Value Date    TSH 4.46 05/27/2022

## 2023-09-14 NOTE — TELEPHONE ENCOUNTER
Pt has her mammogram scheduled for 2:00PM today.    States the Rx needs to be faxed to [phone].

## 2023-09-14 NOTE — TELEPHONE ENCOUNTER
The last call documented is from 10/5/22 - I do not see a single one regarding a mammogram order or fax, let alone four requests.    Order placed, please fax to the number below.   It would be helpful for the patient to clarify what number she has reached out to four times with no response

## 2023-09-22 ENCOUNTER — OFFICE VISIT (OUTPATIENT)
Dept: FAMILY MEDICINE | Facility: CLINIC | Age: 73
End: 2023-09-22
Payer: MEDICARE

## 2023-09-22 VITALS
SYSTOLIC BLOOD PRESSURE: 128 MMHG | BODY MASS INDEX: 27.78 KG/M2 | HEART RATE: 75 BPM | OXYGEN SATURATION: 96 % | DIASTOLIC BLOOD PRESSURE: 72 MMHG | TEMPERATURE: 97.7 F | HEIGHT: 61 IN | RESPIRATION RATE: 18 BRPM

## 2023-09-22 DIAGNOSIS — J45.909 PERSISTENT ASTHMA WITHOUT COMPLICATION, UNSPECIFIED ASTHMA SEVERITY: ICD-10-CM

## 2023-09-22 DIAGNOSIS — R26.89 IMBALANCE: Primary | ICD-10-CM

## 2023-09-22 DIAGNOSIS — E72.11 HOMOCYSTINURIA (CMS/HCC): ICD-10-CM

## 2023-09-22 DIAGNOSIS — R63.5 WEIGHT GAIN: ICD-10-CM

## 2023-09-22 DIAGNOSIS — Z71.85 IMMUNIZATION COUNSELING: ICD-10-CM

## 2023-09-22 PROCEDURE — 99214 OFFICE O/P EST MOD 30 MIN: CPT | Performed by: NURSE PRACTITIONER

## 2023-09-22 RX ORDER — AA/PROT/LYSINE/METHIO/VIT C/B6 50-12.5 MG
10 TABLET ORAL DAILY
COMMUNITY

## 2023-09-22 ASSESSMENT — ENCOUNTER SYMPTOMS
DIZZINESS: 0
FATIGUE: 1
NERVOUS/ANXIOUS: 1
HEADACHES: 0
LIGHT-HEADEDNESS: 0
PALPITATIONS: 1
SHORTNESS OF BREATH: 1
COUGH: 1
CHEST TIGHTNESS: 1
UNEXPECTED WEIGHT CHANGE: 0

## 2023-09-22 NOTE — PROGRESS NOTES
PHOENIX Ramos  [facility]  [address] [place] [address], [address]  [place] [address]  [phone]       Kelly Meeks is a 73 y.o. female who presents today for   Chief Complaint   Patient presents with    Back Pain       Allergies  Epinephrine and Procaine      Current Outpatient Medications:     albuterol HFA (VENTOLIN HFA) 90 mcg/actuation inhaler, INHALE TWO (2) PUFFS BY MOUTH EVERY SIX (6) HOURS, Disp: 1 each, Rfl: 3    cholecalciferol, vitamin D3, (VITAMIN D3) 2,000 unit tablet, Take 2,000 Units by mouth daily., Disp: , Rfl:     coenzyme Q10 (CO Q-10) 10 mg capsule, Take 10 mg by mouth daily., Disp: , Rfl:     DOCOSAHEXANOIC ACID/EPA (FISH OIL ORAL), Take 3,000 mg by mouth daily., Disp: , Rfl:     ezetimibe (ZETIA) 10 mg tablet, TAKE ONE (1) TABLET (10 MG TOTAL) BY MOUTH THREE (3) (THREE) TIMES A WEEK (TUE, THU, SAT)., Disp: 39 tablet, Rfl: 3    famotidine (PEPCID) 20 mg tablet, TAKE ONE TABLET BY MOUTH ONCE DAILY, Disp: 90 tablet, Rfl: 3    fluticasone HFA (FLOVENT HFA) 110 mcg/actuation inhaler, Inhale 1 puff 2 (two) times a day. Rinse mouth after use, Disp: 12 g, Rfl: 5    levothyroxine (SYNTHROID) 100 mcg tablet, TAKE ONE TABLET BY MOUTH ONCE DAILY, Disp: 90 tablet, Rfl: 3    montelukast (SINGULAIR) 10 mg tablet, TAKE ONE (1) TABLET (10 MG TOTAL) BY MOUTH NIGHTLY., Disp: 90 tablet, Rfl: 3    niacin (ENDUR-ACIN) 500 mg CR tablet, Take 2 tablets (1,000 mg total) by mouth nightly., Disp: 180 tablet, Rfl: 3    rosuvastatin (CRESTOR) 5 mg tablet, Take 1 tablet (5 mg total) by mouth 4 (four) times a week (Mon, Wed, Fri, Sat)., Disp: 52 tablet, Rfl: 3    verapamiL (CALAN) 40 mg tablet, TAKE ONE TABLET BY MOUTH ONCE DAILY WHEN NEEDED, Disp: 90 tablet, Rfl: 3    verapamiL (VERELAN) 240 mg 24 hr capsule, Take 1 capsule (240 mg total) by mouth nightly., Disp: 90 capsule, Rfl: 3    cetirizine (ZyrTEC) 10 mg tablet, Take 1 tablet (10 mg total) by mouth nightly. (Patient taking  differently: Take 10 mg by mouth nightly as needed.), Disp: 90 tablet, Rfl: 1    Verapamil 40 mg is PRN tachicardia    HPI    1) instability/weakness/balance concerns  Back and leg pain  Leg pain - very long time    Back pain - after gardening last Sunday  Lower back  Down leg mostly R leg  Was in bed for 2 days after  Today - back is improved - back to baseline    Self Care:  Drinking Bone Broth     Feels unstable on feet - like ankles will give out  Daughter sees her drifting when walking  Very concerned about instablity when on her feet  No falls in past year    2) coughing/asthma  Did not have decreased in cough/asthma/allergy sx as she usually would in the summer  Also rhinitis and sneezing  Stopped taking Zyrtec this past summer  Last night -  took OTC zyrtec - reduced rhinitis      3) steady weight gain over the years  Dr Garzon - lipid program diet  Takes levothyroxine at night after eating stack  Last TSH 5/22 was 4.46    4) vaccine questions    Last BW 6/2023 with Sheryl (high risk lipids) - CaCT with him with score of Zero, this was more recent than 2019  Last general BW with Dr Kirkpatrick (general cards)  5/2022  Scheduled for CPE in 12/2023    No personal hx of cancers  Smoker during her 20s mulp packs per day - quit around 27    Review of Systems   Constitutional: Positive for fatigue (some increased fatigue). Negative for unexpected weight change.   Respiratory: Positive for cough, chest tightness and shortness of breath (getting winded more when walking dog - over last 6 months).    Cardiovascular: Positive for palpitations (has felt a little flutter recently in chest, no SOB or pain with this). Negative for chest pain.   Neurological: Negative for dizziness, light-headedness and headaches.   Psychiatric/Behavioral: The patient is nervous/anxious (increased stress lately).        Objective   Vitals:    09/22/23 1134   BP: 128/72   BP Location: Left upper arm   Patient Position: Sitting   Pulse: 75  "  Resp: 18   Temp: 36.5 °C (97.7 °F)   TempSrc: Temporal   SpO2: 96%   Height: 1.549 m (5' 1\")     Body mass index is 27.78 kg/m².    Physical Exam  Constitutional:       General: She is not in acute distress.     Appearance: She is not ill-appearing or diaphoretic.   Cardiovascular:      Rate and Rhythm: Normal rate and regular rhythm.      Heart sounds: Normal heart sounds, S1 normal and S2 normal. No murmur heard.  Pulmonary:      Effort: Pulmonary effort is normal.      Breath sounds: Normal breath sounds and air entry. No decreased air movement or transmitted upper airway sounds. No decreased breath sounds, wheezing, rhonchi or rales.   Musculoskeletal:      Right lower leg: No edema.      Left lower leg: No edema.   Skin:     General: Skin is warm and dry.   Neurological:      Mental Status: She is alert and oriented to person, place, and time.      Sensory: Sensation is intact.      Motor: Motor function is intact.      Gait: Gait is intact.      Comments: Grossly normal  Upper extremety strength 5/5 bilat  Lower extremeity strength:   Right - 5/5 throughout  Left - 4/5 with extension/flexion of knee         Assessment   Diagnoses and all orders for this visit:    Imbalance (Primary)  -     Ambulatory referral to Physical Therapy; Future    Persistent asthma without complication, unspecified asthma severity    Weight gain    Immunization counseling    Homocystinuria (CMS/[facility])  Comments:  followed by pcp      PT for balance    Asthma/allergy  Reviewed meds - Singular is not type of med as Zyrtec - should take both regularly  Need at least 2 weeks on zyrtec for max benefit  Flovent BID as maintenance, Albuterol PRN for asthma sx  Take albuterol prior to walking  Inform if chest tightness/winded does not improve with this or if worse/change    Weight  Switch levothyroxine to AM and 30-60 min prior to any other meds of food  TSH with MAW in Dec    Vaccine  New COVID vaccine is priority  Flu shot before end of " Oct  RSV vaccine - reviewed +/- for over 65, recommend with asthma but COVID and flu are higher priority   Has had PCV 13 and PPSV after 65 - will review PCV 20 with PCP     I spent 30 minutes on this date of service performing the following activities: obtaining history, performing examination, entering orders, documenting, obtaining / reviewing records and providing counseling and education.         PHOENIX Huffman  9/22/2023

## 2023-09-25 ENCOUNTER — TELEPHONE (OUTPATIENT)
Dept: SCHEDULING | Facility: CLINIC | Age: 73
End: 2023-09-25
Payer: MEDICARE

## 2023-09-25 RX ORDER — VERAPAMIL HCL 240 MG
TABLET, EXTENDED RELEASE ORAL
Qty: 90 TABLET | Refills: 3 | Status: SHIPPED | OUTPATIENT
Start: 2023-09-25 | End: 2024-09-23 | Stop reason: SDUPTHER

## 2023-09-25 NOTE — TELEPHONE ENCOUNTER
Patient Name: Kelly Meeks    Relationship: self     Reason for call: pt has constant fluttering. Next appt in epic is [facility]. Pt was xfer to triage.Like to be seen sooner  Please advise pt     Callback number: [phone]

## 2023-09-25 NOTE — TELEPHONE ENCOUNTER
Medicine Refill Request    Last Office: 9/22/2023   Last Consult Visit: Visit date not found  Last Telemedicine Visit: 4/15/2021 Melonie Guy MD    Next Appointment: 12/12/2023      Current Outpatient Medications:     albuterol HFA (VENTOLIN HFA) 90 mcg/actuation inhaler, INHALE TWO (2) PUFFS BY MOUTH EVERY SIX (6) HOURS, Disp: 1 each, Rfl: 3    cetirizine (ZyrTEC) 10 mg tablet, Take 1 tablet (10 mg total) by mouth nightly. (Patient taking differently: Take 10 mg by mouth nightly as needed.), Disp: 90 tablet, Rfl: 1    cholecalciferol, vitamin D3, (VITAMIN D3) 2,000 unit tablet, Take 2,000 Units by mouth daily., Disp: , Rfl:     coenzyme Q10 (CO Q-10) 10 mg capsule, Take 10 mg by mouth daily., Disp: , Rfl:     DOCOSAHEXANOIC ACID/EPA (FISH OIL ORAL), Take 3,000 mg by mouth daily., Disp: , Rfl:     ezetimibe (ZETIA) 10 mg tablet, TAKE ONE (1) TABLET (10 MG TOTAL) BY MOUTH THREE (3) (THREE) TIMES A WEEK (TUE, THU, SAT)., Disp: 39 tablet, Rfl: 3    famotidine (PEPCID) 20 mg tablet, TAKE ONE TABLET BY MOUTH ONCE DAILY, Disp: 90 tablet, Rfl: 3    fluticasone HFA (FLOVENT HFA) 110 mcg/actuation inhaler, Inhale 1 puff 2 (two) times a day. Rinse mouth after use, Disp: 12 g, Rfl: 5    levothyroxine (SYNTHROID) 100 mcg tablet, TAKE ONE TABLET BY MOUTH ONCE DAILY, Disp: 90 tablet, Rfl: 3    montelukast (SINGULAIR) 10 mg tablet, TAKE ONE (1) TABLET (10 MG TOTAL) BY MOUTH NIGHTLY., Disp: 90 tablet, Rfl: 3    niacin (ENDUR-ACIN) 500 mg CR tablet, Take 2 tablets (1,000 mg total) by mouth nightly., Disp: 180 tablet, Rfl: 3    rosuvastatin (CRESTOR) 5 mg tablet, Take 1 tablet (5 mg total) by mouth 4 (four) times a week (Mon, Wed, Fri, Sat)., Disp: 52 tablet, Rfl: 3    verapamiL (CALAN) 40 mg tablet, TAKE ONE TABLET BY MOUTH ONCE DAILY WHEN NEEDED, Disp: 90 tablet, Rfl: 3    verapamiL (VERELAN) 240 mg 24 hr capsule, Take 1 capsule (240 mg total) by mouth nightly., Disp: 90 capsule, Rfl: 3      BP Readings from Last  "3 Encounters:   09/22/23 128/72   06/28/23 128/70   06/28/23 128/70       Recent Lab results:  Lab Results   Component Value Date    CHOL 235 (H) 06/27/2023   ,   Lab Results   Component Value Date     06/27/2023   ,   Lab Results   Component Value Date    LDLCALC 119 (H) 06/27/2023   ,   Lab Results   Component Value Date    TRIG 69 06/27/2023        Lab Results   Component Value Date    GLUCOSE 88 06/27/2023   , No results found for: \"HGBA1C\"      Lab Results   Component Value Date    CREATININE 0.8 06/27/2023       Lab Results   Component Value Date    TSH 4.46 05/27/2022            "

## 2023-09-25 NOTE — TELEPHONE ENCOUNTER
"Pt of Dr. Saucedo and Dr. Muse. Last OV 6/28/23 and 5/26/22 respectively. PMH includes thoracic aortic and carotid arterial calcification, polygenic hypercholesterolemia, elevated LP(a), vitamin-D deficiency, statin intolerance, aortic valve stenosis, paroxysmal SVT, hypothyroidism.     Received call from patient reporting intermittent \"fluttering in chest\" over the past 1 week. Pt states episodes are very brief, only last a few seconds, however they occur 2-3x an hour. Pt denies any chest pain, SOB, dizziness, or lightheadedness. They do not occur when she is sleeping. Pt denies any known precipitating factors.     Patient states episodes occur less frequently after her evening glass of wine, therefore she is unsure if it is stress related. States she has been under more stress than usual lately. Also states current episodes are not similar to previous episodes of SVT.     Patient used to take klonopin to help symptoms but it was discontinued by her PCP. She recently started taking MCT powder, a lectin blocker, and GI advantage. Denies recent use of steroids.     Patient is compliant with all cardiac medication. She has one cup of caffeinated coffee a day and one glass of wine every night with dinner.     Pt checks HR with pulse ox. States she is never able to capture HR during episodes of fluttering because it is too quick. Current HR is 74 bpm, O2 sat 98% on room air. Pt states most of the time her O2 sat is 93%.     Patient does not drink much water, instructed her to increase water consumption to at least 32 oz a day to start. Goal is to have 60 oz a day.     Pt is overdue for appointment w/ Dr. Muse and asking to be seen as soon as possible.    For further recommendations, patient can be reached at [phone].   "

## 2023-09-28 ENCOUNTER — TELEPHONE (OUTPATIENT)
Dept: CARDIOLOGY | Facility: CLINIC | Age: 73
End: 2023-09-28
Payer: MEDICARE

## 2023-09-28 ENCOUNTER — OFFICE VISIT (OUTPATIENT)
Dept: CARDIOLOGY | Facility: CLINIC | Age: 73
End: 2023-09-28
Payer: MEDICARE

## 2023-09-28 VITALS
HEIGHT: 61 IN | BODY MASS INDEX: 27.38 KG/M2 | DIASTOLIC BLOOD PRESSURE: 80 MMHG | WEIGHT: 145 LBS | HEART RATE: 79 BPM | OXYGEN SATURATION: 98 % | SYSTOLIC BLOOD PRESSURE: 132 MMHG

## 2023-09-28 DIAGNOSIS — E78.00 POLYGENIC HYPERCHOLESTEROLEMIA: ICD-10-CM

## 2023-09-28 DIAGNOSIS — R00.2 PALPITATION: ICD-10-CM

## 2023-09-28 DIAGNOSIS — I35.0 NONRHEUMATIC AORTIC VALVE STENOSIS: ICD-10-CM

## 2023-09-28 DIAGNOSIS — I47.10 SUPRAVENTRICULAR TACHYCARDIA (CMS/HCC): Primary | ICD-10-CM

## 2023-09-28 LAB — TSH SERPL DL<=0.05 MIU/L-ACNC: 0.51 MIU/L (ref 0.34–5.6)

## 2023-09-28 PROCEDURE — 84443 ASSAY THYROID STIM HORMONE: CPT | Performed by: INTERNAL MEDICINE

## 2023-09-28 PROCEDURE — 36415 COLL VENOUS BLD VENIPUNCTURE: CPT | Performed by: INTERNAL MEDICINE

## 2023-09-28 PROCEDURE — 93000 ELECTROCARDIOGRAM COMPLETE: CPT | Performed by: INTERNAL MEDICINE

## 2023-09-28 PROCEDURE — 99214 OFFICE O/P EST MOD 30 MIN: CPT | Performed by: INTERNAL MEDICINE

## 2023-09-28 NOTE — PROGRESS NOTES
" Ofelia Muse MD, [facility]  Cardiac Electrophysiology    [facility]    [facility]  [facility] [address]  [address] [place] [address]    TEL  [phone]  [facility]       Electrophysiology Office Visit  9/28/2023        Kelly Meeks is a 73 y.o. female who presents to the office today with a history of supraventricular tachycardia. The patient also has a past medical history of dyslipidemia and elevated LP(a). The patient had an ultrasound of the carotid bilateral on 12/31/2019, which revealed mild plaque. In addition, completed a CT coronary calcium test on 12/20/2019, which revealed a score of 0.    I last saw her  for follow-up in the office in May 26, 2022.  Patient presents to the office sooner than her scheduled appointment due to new onset of heart palpitations. She describes her symptoms as \"flutters\" that recently started 1 week ago. The fluttering lasts seconds, but comes and goes throughout the day.  The symptoms are different than her symptoms when she has supraventricular tachycardia.  She has no recent changes in her diet. She takes a lot of supplements, but none of them are new. She has been under a lot of stress recently related to work and and in her personal life. She denies other cardiac symptoms such as chest pain, worsening shortness of breath, dizziness, or syncope. Her symptoms are different from her typical SVT. She denies any recurrences of her SVT over the past year. No new medical problems.  Patient continues to follow with Dr. Saucedo for dyslipidemia and was recently seen on 06/28/2023. She also had an echocardiogram done in June.     Patient Active Problem List   Diagnosis    Asthma    Supraventricular tachycardia    Hypothyroidism    Murmur    Esophageal reflux    Elevated Lp(a)    Arteriosclerosis of both carotid arteries    Raynaud's disease without gangrene    Insulin resistance    Homocystinuria " (CMS/[facility])    Dyslipidemia    Palpitation    Polygenic hypercholesterolemia    Nonrheumatic aortic valve stenosis     Past Medical History:   Diagnosis Date    Anxiety     Asthma     Coronary artery disease     Hyperlipidemia     Hypothyroidism      Past Surgical History:   Procedure Laterality Date    ADENOIDECTOMY      CARDIAC CATHETERIZATION  1976    TONSILLECTOMY       Allergies: Epinephrine and Procaine    Current Outpatient Medications   Medication Sig Dispense Refill    albuterol HFA (VENTOLIN HFA) 90 mcg/actuation inhaler INHALE TWO (2) PUFFS BY MOUTH EVERY SIX (6) HOURS 1 each 3    cetirizine (ZyrTEC) 10 mg tablet Take 1 tablet (10 mg total) by mouth nightly. (Patient taking differently: Take 10 mg by mouth nightly as needed.) 90 tablet 1    cholecalciferol, vitamin D3, (VITAMIN D3) 2,000 unit tablet Take 2,000 Units by mouth daily.      coenzyme Q10 (CO Q-10) 10 mg capsule Take 10 mg by mouth daily.      DOCOSAHEXANOIC ACID/EPA (FISH OIL ORAL) Take 3,000 mg by mouth daily.      ezetimibe (ZETIA) 10 mg tablet TAKE ONE (1) TABLET (10 MG TOTAL) BY MOUTH THREE (3) (THREE) TIMES A WEEK (TUE, THU, SAT). 39 tablet 3    famotidine (PEPCID) 20 mg tablet TAKE ONE TABLET BY MOUTH ONCE DAILY 90 tablet 3    fluticasone HFA (FLOVENT HFA) 110 mcg/actuation inhaler Inhale 1 puff 2 (two) times a day. Rinse mouth after use 12 g 5    levothyroxine (SYNTHROID) 100 mcg tablet TAKE ONE TABLET BY MOUTH ONCE DAILY 90 tablet 3    montelukast (SINGULAIR) 10 mg tablet TAKE ONE (1) TABLET (10 MG TOTAL) BY MOUTH NIGHTLY. 90 tablet 3    niacin (ENDUR-ACIN) 500 mg CR tablet Take 2 tablets (1,000 mg total) by mouth nightly. 180 tablet 3    rosuvastatin (CRESTOR) 5 mg tablet Take 1 tablet (5 mg total) by mouth 4 (four) times a week (Mon, Wed, Fri, Sat). 52 tablet 3    verapamiL (CALAN) 40 mg tablet TAKE ONE TABLET BY MOUTH ONCE DAILY WHEN NEEDED 90 tablet 3    verapamil SR (CALAN-SR) 240 mg CR tablet TAKE ONE (1)  "CAPSULE (240 MG TOTAL) BY MOUTH NIGHTLY. 90 tablet 3     No current facility-administered medications for this visit.        .   Objective     Vitals:    09/28/23 1026   BP: 132/80   Pulse: 79   SpO2: 98%   Weight: 65.8 kg (145 lb)   Height: 1.549 m (5' 1\")     Physical Exam : Pleasant, in no acute distress.  Head:                    Atraumatic. Normocephalic  Eyes:                     No scleral icterus. Normal EOMs  Neck:                     No thyromegaly present. Normal ROM  Vascular:              No JVD. No carotid bruits  Cardiovascular:   Normal S1/S2, Regular rhythm, no murmur heard.  Pulmonary:           Breath sounds normal. No  Rales, wheezes or rhonchi bilateral.  Abdominal:           Soft. There is no tenderness. Bowel sounds present. No masses.  Musculoskeletal: There are no deformities.   Extremities:          No edema or cyanosis.  Neurological:        Alert, oriented x3. No focal deficits to gross exam.  Skin:                      No rash noted.   Psychiatric:           Normal mood and affect       ECG:  I personally reviewed the 12 lead EKG obtained in the office today which reveals sinus rhythm at 76 bpm. Normal EKG.    Lab Results   Component Value Date    HGB 13.9 05/27/2022     05/27/2022    WBC 3.96 05/27/2022    ALT 38 06/27/2023    AST 32 06/27/2023     06/27/2023    BUN 16 06/27/2023    CREATININE 0.8 06/27/2023    K 4.1 06/27/2023    GLUCOSE 88 06/27/2023    TSH 0.51 09/28/2023    CHOL 235 (H) 06/27/2023     06/27/2023    TRIG 69 06/27/2023    LDLCALC 119 (H) 06/27/2023       CT CALCIUM SCORE: 06/03/2019  CORONARY CALCIUM COMPOSITE AGATSTON SCORE: 0      Cardiac Imaging    TRANSTHORACIC ECHO (TTE) COMPLETE 06/28/2023    Interpretation Summary    Left Ventricle: Normal ventricle size. Normal wall thickness. Estimated EF 65-70%. No regional wall motion abnormalities. Grade I diastolic dysfunction.    Aortic Valve: Tricuspid valve.  Calcification present with reduced " excursion. Trace regurgitation. Mild to moderate stenosis. The peak aortic velocity was measured in the apical view. Peak velocity = 2.79 m/s. Mean gradient = 17.00 mmHg. Calculated area by cont eq = 1.43 cm2. Calculated dimensionless index = 0.50.    Aorta: Aortic root normal. Sinuses of Valsalva normal-sized. Ascending aorta normal-sized.    Mitral Valve: Normal leaflet structure. Mild regurgitation.    Left Atrium: Mildly dilated atrium.    Right Atrium: Mildly dilated atrium.    Tricuspid Valve: Normal structure. Mild regurgitation. Estimated RVSP = 31 mmHg.    Right Ventricle: Normal ventricle size. Normal systolic function.    Pulmonic Valve: Normal structure. Trace regurgitation.    IVC/SVC: Inferior vena cava is dilated (>2.1cm). Inferior vena cava demonstrates normal respiratory collapse.    Pericardium: Normal structure.      ASSESSMENT/PLAN:  Palpitation  Patient has new onset of heart palpitations described as flutters over the past 1 week. The episodes occur everyday and are constant throughout the day. No arrhythmias are noted on her EKG at the time of this office visit. Her symptoms are very different from her typical SVT.  It is possible that her symptoms may be secondary to short runs of atrial tachycardia or premature ventricular or atrial beats.  I ordered a Zio Monitor for 5 days for further evaluation. I also ordered a TSH to be checked.     Supraventricular tachycardia (CMS/HCC) (HCC)  No recurrences of sustained SVT over the past 1 year. Patient continues to take Verapamil SR 240mg daily.     Polygenic hypercholesterolemia  She will continue current medical therapy and follow-up with Dr. Saucedo.  She has a family history of premature atherosclerotic coronary artery disease.  The CT heart calcium score was 0 in 2019.    Nonrheumatic aortic valve stenosis  Her echocardiogram obtained on June 28, 2023 revealed mild to moderate aortic stenosis.  She is asymptomatic.  She will continue  to follow-up with Dr. Saucedo.    Orders Placed This Encounter   Procedures    TSH w reflex FT4     Standing Status:   Future     Number of Occurrences:   1     Standing Expiration Date:   9/28/2024     Order Specific Question:   Release to patient     Answer:   Immediate [1]    Zio patch - extended holter 3-7 days     5 Days     Order Specific Question:   Release to patient     Answer:   Immediate [1]       Thank you for allowing me to participate in the care of your patient.  Please do not hesitate to contact me if you have any questions regarding my recommendations and management. Return in about 6 months (around 3/28/2024).      Sincerely;    Ofelia Guerra M.D., [facility] , [facility]    This document was generated utilizing voice recognition technology. A reasonable attempt at proofreading has been made to minimize errors but please excuse any typographical errors which may be present. Please call with any questions.    Bertin TIRADO PA-C, am scribing for, and in the presence of, Ofelia Guerra MD.    IOfelia MD, personally performed the services described in this documentation as described by Bertin Arauz in my presence, and it is both accurate and complete. I have seen and examined the patient.  I have reviewed the PA note and supporting documentation. The note has been amended as appropriate.

## 2023-09-28 NOTE — ASSESSMENT & PLAN NOTE
Patient has new onset of heart palpitations described as flutters over the past 1 week. The episodes occur everyday and are constant throughout the day. No arrhythmias are noted on her EKG at the time of this office visit. Her symptoms are very different from her typical SVT.  It is possible that her symptoms may be secondary to short runs of atrial tachycardia or premature ventricular or atrial beats.  I ordered a Zio Monitor for 5 days for further evaluation. I also ordered a TSH to be checked.

## 2023-09-28 NOTE — ASSESSMENT & PLAN NOTE
No recurrences of sustained SVT over the past 1 year. Patient continues to take Verapamil SR 240mg daily.

## 2023-09-28 NOTE — LETTER
"October 2, 2023     Massiel West MD  [address]  [facility] [address]    Patient: Kelly Meeks  YOB: 1950  Date of Visit: 9/28/2023      Dear Dr. West:    Thank you for referring Kelly Meeks to me for evaluation. Below are my notes for this consultation.    If you have questions, please do not hesitate to call me. I look forward to following your patient along with you.         Sincerely,        Ofelia Muse MD        CC: MD Micha Cross Maribel, MD  10/2/2023  4:32 PM  Signed   Ofelia Muse MD, [facility]  Cardiac Electrophysiology    [facility]  [facility]  [facility]  [address] [place] [address]    TEL  [phone]  [facility]       Electrophysiology Office Visit  9/28/2023        Kelly Meeks is a 73 y.o. female who presents to the office today with a history of supraventricular tachycardia. The patient also has a past medical history of dyslipidemia and elevated LP(a). The patient had an ultrasound of the carotid bilateral on 12/31/2019, which revealed mild plaque. In addition, completed a CT coronary calcium test on 12/20/2019, which revealed a score of 0.    I last saw her  for follow-up in the office in May 26, 2022.  Patient presents to the office sooner than her scheduled appointment due to new onset of heart palpitations. She describes her symptoms as \"flutters\" that recently started 1 week ago. The fluttering lasts seconds, but comes and goes throughout the day.  The symptoms are different than her symptoms when she has supraventricular tachycardia.  She has no recent changes in her diet. She takes a lot of supplements, but none of them are new. She has been under a lot of stress recently related to work and and in her personal life. She denies other cardiac symptoms such as chest pain, worsening shortness of breath, dizziness, or syncope. Her symptoms are " different from her typical SVT. She denies any recurrences of her SVT over the past year. No new medical problems.  Patient continues to follow with Dr. Saucedo for dyslipidemia and was recently seen on 06/28/2023. She also had an echocardiogram done in June.     Patient Active Problem List   Diagnosis    Asthma    Supraventricular tachycardia    Hypothyroidism    Murmur    Esophageal reflux    Elevated Lp(a)    Arteriosclerosis of both carotid arteries    Raynaud's disease without gangrene    Insulin resistance    Homocystinuria (CMS/HCC)    Dyslipidemia    Palpitation    Polygenic hypercholesterolemia    Nonrheumatic aortic valve stenosis     Past Medical History:   Diagnosis Date    Anxiety     Asthma     Coronary artery disease     Hyperlipidemia     Hypothyroidism      Past Surgical History:   Procedure Laterality Date    ADENOIDECTOMY      CARDIAC CATHETERIZATION  1976    TONSILLECTOMY       Allergies: Epinephrine and Procaine    Current Outpatient Medications   Medication Sig Dispense Refill    albuterol HFA (VENTOLIN HFA) 90 mcg/actuation inhaler INHALE TWO (2) PUFFS BY MOUTH EVERY SIX (6) HOURS 1 each 3    cetirizine (ZyrTEC) 10 mg tablet Take 1 tablet (10 mg total) by mouth nightly. (Patient taking differently: Take 10 mg by mouth nightly as needed.) 90 tablet 1    cholecalciferol, vitamin D3, (VITAMIN D3) 2,000 unit tablet Take 2,000 Units by mouth daily.      coenzyme Q10 (CO Q-10) 10 mg capsule Take 10 mg by mouth daily.      DOCOSAHEXANOIC ACID/EPA (FISH OIL ORAL) Take 3,000 mg by mouth daily.      ezetimibe (ZETIA) 10 mg tablet TAKE ONE (1) TABLET (10 MG TOTAL) BY MOUTH THREE (3) (THREE) TIMES A WEEK (TUE, THU, SAT). 39 tablet 3    famotidine (PEPCID) 20 mg tablet TAKE ONE TABLET BY MOUTH ONCE DAILY 90 tablet 3    fluticasone HFA (FLOVENT HFA) 110 mcg/actuation inhaler Inhale 1 puff 2 (two) times a day. Rinse mouth after use 12 g 5    levothyroxine (SYNTHROID) 100 mcg  "tablet TAKE ONE TABLET BY MOUTH ONCE DAILY 90 tablet 3    montelukast (SINGULAIR) 10 mg tablet TAKE ONE (1) TABLET (10 MG TOTAL) BY MOUTH NIGHTLY. 90 tablet 3    niacin (ENDUR-ACIN) 500 mg CR tablet Take 2 tablets (1,000 mg total) by mouth nightly. 180 tablet 3    rosuvastatin (CRESTOR) 5 mg tablet Take 1 tablet (5 mg total) by mouth 4 (four) times a week (Mon, Wed, Fri, Sat). 52 tablet 3    verapamiL (CALAN) 40 mg tablet TAKE ONE TABLET BY MOUTH ONCE DAILY WHEN NEEDED 90 tablet 3    verapamil SR (CALAN-SR) 240 mg CR tablet TAKE ONE (1) CAPSULE (240 MG TOTAL) BY MOUTH NIGHTLY. 90 tablet 3     No current facility-administered medications for this visit.        .   Objective     Vitals:    09/28/23 1026   BP: 132/80   Pulse: 79   SpO2: 98%   Weight: 65.8 kg (145 lb)   Height: 1.549 m (5' 1\")     Physical Exam : Pleasant, in no acute distress.  Head:                    Atraumatic. Normocephalic  Eyes:                     No scleral icterus. Normal EOMs  Neck:                     No thyromegaly present. Normal ROM  Vascular:              No JVD. No carotid bruits  Cardiovascular:   Normal S1/S2, Regular rhythm, no murmur heard.  Pulmonary:           Breath sounds normal. No  Rales, wheezes or rhonchi bilateral.  Abdominal:           Soft. There is no tenderness. Bowel sounds present. No masses.  Musculoskeletal: There are no deformities.   Extremities:          No edema or cyanosis.  Neurological:        Alert, oriented x3. No focal deficits to gross exam.  Skin:                      No rash noted.   Psychiatric:           Normal mood and affect       ECG:  I personally reviewed the 12 lead EKG obtained in the office today which reveals sinus rhythm at 76 bpm. Normal EKG.    Lab Results   Component Value Date    HGB 13.9 05/27/2022     05/27/2022    WBC 3.96 05/27/2022    ALT 38 06/27/2023    AST 32 06/27/2023     06/27/2023    BUN 16 06/27/2023    CREATININE 0.8 06/27/2023    K 4.1 06/27/2023    " GLUCOSE 88 06/27/2023    TSH 0.51 09/28/2023    CHOL 235 (H) 06/27/2023     06/27/2023    TRIG 69 06/27/2023    LDLCALC 119 (H) 06/27/2023       CT CALCIUM SCORE: 06/03/2019  CORONARY CALCIUM COMPOSITE AGATSTON SCORE: 0      Cardiac Imaging    TRANSTHORACIC ECHO (TTE) COMPLETE 06/28/2023    Interpretation Summary    Left Ventricle: Normal ventricle size. Normal wall thickness. Estimated EF 65-70%. No regional wall motion abnormalities. Grade I diastolic dysfunction.    Aortic Valve: Tricuspid valve.  Calcification present with reduced excursion. Trace regurgitation. Mild to moderate stenosis. The peak aortic velocity was measured in the apical view. Peak velocity = 2.79 m/s. Mean gradient = 17.00 mmHg. Calculated area by cont eq = 1.43 cm2. Calculated dimensionless index = 0.50.    Aorta: Aortic root normal. Sinuses of Valsalva normal-sized. Ascending aorta normal-sized.    Mitral Valve: Normal leaflet structure. Mild regurgitation.    Left Atrium: Mildly dilated atrium.    Right Atrium: Mildly dilated atrium.    Tricuspid Valve: Normal structure. Mild regurgitation. Estimated RVSP = 31 mmHg.    Right Ventricle: Normal ventricle size. Normal systolic function.    Pulmonic Valve: Normal structure. Trace regurgitation.    IVC/SVC: Inferior vena cava is dilated (>2.1cm). Inferior vena cava demonstrates normal respiratory collapse.    Pericardium: Normal structure.      ASSESSMENT/PLAN:  Palpitation  Patient has new onset of heart palpitations described as flutters over the past 1 week. The episodes occur everyday and are constant throughout the day. No arrhythmias are noted on her EKG at the time of this office visit. Her symptoms are very different from her typical SVT.  It is possible that her symptoms may be secondary to short runs of atrial tachycardia or premature ventricular or atrial beats.  I ordered a Zio Monitor for 5 days for further evaluation. I also ordered a TSH to be checked.      Supraventricular tachycardia (CMS/HCC) (HCC)  No recurrences of sustained SVT over the past 1 year. Patient continues to take Verapamil SR 240mg daily.     Polygenic hypercholesterolemia  She will continue current medical therapy and follow-up with Dr. Saucedo.  She has a family history of premature atherosclerotic coronary artery disease.  The CT heart calcium score was 0 in 2019.    Nonrheumatic aortic valve stenosis  Her echocardiogram obtained on June 28, 2023 revealed mild to moderate aortic stenosis.  She is asymptomatic.  She will continue to follow-up with Dr. Saucedo.    Orders Placed This Encounter   Procedures    TSH w reflex FT4     Standing Status:   Future     Number of Occurrences:   1     Standing Expiration Date:   9/28/2024     Order Specific Question:   Release to patient     Answer:   Immediate [1]    Zio patch - extended holter 3-7 days     5 Days     Order Specific Question:   Release to patient     Answer:   Immediate [1]       Thank you for allowing me to participate in the care of your patient.  Please do not hesitate to contact me if you have any questions regarding my recommendations and management. Return in about 6 months (around 3/28/2024).      Sincerely;    Ofelia Guerra M.D., FACC , FACP    This document was generated utilizing voice recognition technology. A reasonable attempt at proofreading has been made to minimize errors but please excuse any typographical errors which may be present. Please call with any questions.    Bertin TIRADO PA-C, am scribing for, and in the presence of, Ofelia Guerra MD.    IOeflia MD, personally performed the services described in this documentation as described by Bertin Arauz in my presence, and it is both accurate and complete. I have seen and examined the patient.  I have reviewed the PA note and supporting documentation. The note has been amended as appropriate.

## 2023-09-29 NOTE — TELEPHONE ENCOUNTER
Please call the patient and let her know that her TSH level is within the normal range but should be rechecked several months since is it is at the low range and she may need adjustment of her medication (lowering her dose) if it continues to decrease.

## 2023-10-02 PROBLEM — E78.00 POLYGENIC HYPERCHOLESTEROLEMIA: Status: ACTIVE | Noted: 2023-10-02

## 2023-10-02 PROBLEM — I35.0 NONRHEUMATIC AORTIC VALVE STENOSIS: Status: ACTIVE | Noted: 2023-10-02

## 2023-10-02 NOTE — ASSESSMENT & PLAN NOTE
She will continue current medical therapy and follow-up with Dr. Saucedo.  She has a family history of premature atherosclerotic coronary artery disease.  The CT heart calcium score was 0 in 2019.

## 2023-10-02 NOTE — ASSESSMENT & PLAN NOTE
Her echocardiogram obtained on June 28, 2023 revealed mild to moderate aortic stenosis.  She is asymptomatic.  She will continue to follow-up with Dr. Saucedo.

## 2023-10-06 ENCOUNTER — TELEPHONE (OUTPATIENT)
Dept: CARDIOLOGY | Facility: CLINIC | Age: 73
End: 2023-10-06
Payer: MEDICARE

## 2023-10-06 DIAGNOSIS — E03.8 OTHER SPECIFIED HYPOTHYROIDISM: Primary | ICD-10-CM

## 2023-10-06 NOTE — TELEPHONE ENCOUNTER
Mailed.  
Patient needs script for TSH as well. Can you please order it for her. She it to be mailed to her with CMP AND LIPID panel.  
2019 07:59

## 2023-10-09 ENCOUNTER — OFFICE VISIT (OUTPATIENT)
Dept: FAMILY MEDICINE | Facility: CLINIC | Age: 73
End: 2023-10-09
Payer: MEDICARE

## 2023-10-09 VITALS
TEMPERATURE: 97.8 F | SYSTOLIC BLOOD PRESSURE: 122 MMHG | DIASTOLIC BLOOD PRESSURE: 74 MMHG | RESPIRATION RATE: 16 BRPM | OXYGEN SATURATION: 96 % | HEART RATE: 74 BPM

## 2023-10-09 DIAGNOSIS — R79.89 LOW TSH LEVEL: ICD-10-CM

## 2023-10-09 DIAGNOSIS — R29.898 WEAKNESS OF BOTH LOWER EXTREMITIES: ICD-10-CM

## 2023-10-09 DIAGNOSIS — R00.2 PALPITATIONS: Primary | ICD-10-CM

## 2023-10-09 DIAGNOSIS — R26.89 IMBALANCE: ICD-10-CM

## 2023-10-09 DIAGNOSIS — M25.642 JOINT STIFFNESS OF HAND, LEFT: ICD-10-CM

## 2023-10-09 PROCEDURE — 99213 OFFICE O/P EST LOW 20 MIN: CPT | Performed by: NURSE PRACTITIONER

## 2023-10-09 ASSESSMENT — ENCOUNTER SYMPTOMS
COUGH: 1
SHORTNESS OF BREATH: 1
WHEEZING: 0
HEADACHES: 0

## 2023-10-09 NOTE — PROGRESS NOTES
PHOENIX Ramos  [facility]  [address]  [place] [address]  [phone]       Kelly Meeks is a 73 y.o. female who presents today for   Chief Complaint   Patient presents with    Arthritis       Allergies  Epinephrine and Procaine      Current Outpatient Medications:     albuterol HFA (VENTOLIN HFA) 90 mcg/actuation inhaler, INHALE TWO (2) PUFFS BY MOUTH EVERY SIX (6) HOURS, Disp: 1 each, Rfl: 3    cetirizine (ZyrTEC) 10 mg tablet, Take 1 tablet (10 mg total) by mouth nightly. (Patient taking differently: Take 10 mg by mouth nightly as needed.), Disp: 90 tablet, Rfl: 1    cholecalciferol, vitamin D3, (VITAMIN D3) 2,000 unit tablet, Take 2,000 Units by mouth daily., Disp: , Rfl:     coenzyme Q10 (CO Q-10) 10 mg capsule, Take 10 mg by mouth daily., Disp: , Rfl:     DOCOSAHEXANOIC ACID/EPA (FISH OIL ORAL), Take 3,000 mg by mouth daily., Disp: , Rfl:     ezetimibe (ZETIA) 10 mg tablet, TAKE ONE (1) TABLET (10 MG TOTAL) BY MOUTH THREE (3) (THREE) TIMES A WEEK (TUE, THU, SAT)., Disp: 39 tablet, Rfl: 3    famotidine (PEPCID) 20 mg tablet, TAKE ONE TABLET BY MOUTH ONCE DAILY, Disp: 90 tablet, Rfl: 3    fluticasone HFA (FLOVENT HFA) 110 mcg/actuation inhaler, Inhale 1 puff 2 (two) times a day. Rinse mouth after use, Disp: 12 g, Rfl: 5    levothyroxine (SYNTHROID) 100 mcg tablet, TAKE ONE TABLET BY MOUTH ONCE DAILY, Disp: 90 tablet, Rfl: 3    montelukast (SINGULAIR) 10 mg tablet, TAKE ONE (1) TABLET (10 MG TOTAL) BY MOUTH NIGHTLY., Disp: 90 tablet, Rfl: 3    niacin (ENDUR-ACIN) 500 mg CR tablet, Take 2 tablets (1,000 mg total) by mouth nightly., Disp: 180 tablet, Rfl: 3    rosuvastatin (CRESTOR) 5 mg tablet, Take 1 tablet (5 mg total) by mouth 4 (four) times a week (Mon, Wed, Fri, Sat)., Disp: 52 tablet, Rfl: 3    verapamiL (CALAN) 40 mg tablet, TAKE ONE TABLET BY MOUTH ONCE DAILY WHEN NEEDED, Disp: 90 tablet, Rfl: 3    verapamil SR (CALAN-SR) 240 mg CR  "tablet, TAKE ONE (1) CAPSULE (240 MG TOTAL) BY MOUTH NIGHTLY., Disp: 90 tablet, Rfl: 3    HPI    1) 9/28/2023 Dr Muse Cardiology for palpaitons  heart monitor x 1 week - finished last week  Palpations have improved  TSH had been running upper 3's-mid 4  On 9/28 TSH was 0.5  Cards advised PCP follow up to discuss levo dose reduction.   Pt wants to repeat TSH and is asking about timing     Pt denies known med error - for example taking 2 tabs levo in same day.  9/22/2022 when I saw pt I advised moving her levo to AM fasting to reduce impact of eating prior to med because of weigh gain concerns and last TSH of 4.5.  Pt does think she may have taking Levo one night and the again the next AM with switch.  She has hx of PSVT    2) left hand 3rd and 4th fingers stiff and like a claw - hard to open  Sudden onset about a week ago  No injury or increased use of fingers prior to onset  No other contracture issues in body  Currently:  Not as painful as a week ago  But still some pain  Can move hand    No prior injury/issues to the hand  No Rh or autoimmune disease in family    Self care -   Ibuprofen 200 mg in AM and 400 mg in PM - for about a week now  Blue imeo  Bone broth    Taking Multiple Supplements - months ago except Mag 2 weeks ago (has stopped with cramping of hand)  Magnesium 80mg daily  \"Lectin Shield\" - 2 tabs  \"GI Advantages\" Probiotic  MCT Wellness - Prebiotic and grape complex    Thinking of adding collegian for joints      3) Hard time getting up from squatting or kneeling  Ongoing has not improved   9/2022 pt c/o balance weakness concerns - discussed PT  Waiting list to start PT  Still waiting for PT evaluation - is scheduled in 11/2023 11/28 - appointment with Dr Hair  Currently, on Dr West's panel but is considering switching to Dr Hair because Dr Hair is here every day. Scheduled with Reginaldo on 11/28.   12/12 CPE with Jenna        Review of Systems   Respiratory: Positive for cough (morning cough, " zyrtec in PM is reducing PND in AM. Cough improviing with this) and shortness of breath (a little with walking dog, using albuterol prior with improvment). Negative for wheezing.    Cardiovascular: Negative for chest pain.   Neurological: Negative for headaches.           Objective   Vitals:    10/09/23 1104   BP: 122/74   Pulse: 74   Resp: 16   Temp: 36.6 °C (97.8 °F)   TempSrc: Temporal   SpO2: 96%     There is no height or weight on file to calculate BMI.    Physical Exam  Constitutional:       General: She is not in acute distress.     Appearance: She is not ill-appearing or diaphoretic.   Cardiovascular:      Rate and Rhythm: Normal rate and regular rhythm.      Heart sounds: Normal heart sounds, S1 normal and S2 normal. No murmur heard.  Pulmonary:      Effort: Pulmonary effort is normal.      Breath sounds: Normal breath sounds and air entry. No decreased air movement or transmitted upper airway sounds. No decreased breath sounds, wheezing, rhonchi or rales.   Musculoskeletal:      Left elbow: Normal.      Left forearm: Normal.      Right wrist: Normal.      Left wrist: Normal.      Right hand: Normal. No swelling, deformity or tenderness. Normal range of motion.      Left hand: Normal. No swelling, deformity (no tenderness/mass along palmar tendons) or tenderness. Normal range of motion.      Right lower leg: No edema.      Left lower leg: No edema.   Skin:     General: Skin is warm and dry.   Neurological:      Mental Status: She is alert and oriented to person, place, and time.      Comments: Grossly normal         Assessment   Diagnoses and all orders for this visit:    Palpitations (Primary)  Comments:  continue with cardiology    Low TSH level  Comments:  Palps are improving and no other sx of hyperthroid. If this contiinues reasonable to wait 6 weeks from AM dosing change for repeat TSH.    Joint stiffness of hand, left  Comments:  Cause unclear, but improving. no other joint involvement.  ?overuse  Continue to monitor.   If re-occur, come when happening.    Imbalance  Comments:  PT as ordered 9/22    Weakness of both lower extremities  Comments:  PT as ordered 9/22,             PHEONIX Huffman  10/10/2023

## 2023-10-16 ENCOUNTER — TELEPHONE (OUTPATIENT)
Dept: SCHEDULING | Facility: CLINIC | Age: 73
End: 2023-10-16
Payer: MEDICARE

## 2023-10-16 NOTE — TELEPHONE ENCOUNTER
I reviewed and signed the report.  Please call the patient. She had one 3.8 sec of SVT and other triggered events were sinus rhythm with single VPDs which are all benign.

## 2023-10-16 NOTE — TELEPHONE ENCOUNTER
Pt called for her Lilliana results.     States Lilliana informed her the results were back and she would like to discuss them as soon as they are received.    Pt can be reached at [phone]

## 2023-10-17 NOTE — TELEPHONE ENCOUNTER
"I spoke with patient and informed her of Dr. Muse's interpretation of her ZIO monitor results, in which she expressed further concerns.  She stated while she was wearing the monitor she did not have those feelings as much as she was prior to the monitor and now.  She states they have increased since the monitor.  I did reassure her that these premature beats were benign, however, she feels like they are \"zapping\" her.  She also stated that \"she cannot live like this.\" Dr. Muse, please advise.   "

## 2023-10-17 NOTE — TELEPHONE ENCOUNTER
Please call her back. Tell her that the only option is to add more medication and since they are benign, I usually prefer not to add more drugs due to potential side effects. I will call her tomorrow to discuss or will set up a Televideo visit.

## 2023-10-25 ENCOUNTER — HOSPITAL ENCOUNTER (OUTPATIENT)
Dept: RADIOLOGY | Age: 73
Discharge: HOME | End: 2023-10-25
Attending: INTERNAL MEDICINE
Payer: MEDICARE

## 2023-10-25 DIAGNOSIS — Z78.0 MENOPAUSE: ICD-10-CM

## 2023-10-25 PROCEDURE — 77080 DXA BONE DENSITY AXIAL: CPT

## 2023-11-27 NOTE — PROGRESS NOTES
Kylee Hair MD  [facility]  [address]  [place] [address]  [phone]     Reason for visit:   Chief Complaint   Patient presents with    Establish Care     Mushroom supplements?        HPI   Kelly Meeks is a 73 y.o. female who presents to establish care.    Switching care from Dr. West. [place] she could not be seen as often as needed. She is scheduled for a CPE with Dr. West 12/12/23.    Specialists:  -Dr. Muse (Cardiology)-h/o SVT., mild-mod aortic stenosis (echo 6/28/23)  -Dr. Saucedo (Lipidology)-  familial HLD, coronary ca 2019 of zero, Rx rosuva 5 mg, zetia 10  -Dr. aFith (Rheumatology)  -Dr. Dot Orozco  -Dr. Cait Harden (Gyn)  -Dr. Lai Wolfe (Sports)  -[facility] - Omesha Olivo, Sundelfino Malika     -Premature beats. Affect her sleep. Does drink wine daily. Recent Zio with cardiolgoist unremarkable but for PACs.    -Hypothyroidism. Prescribed levothyroxine 100 mcg daily. Most recent TSH 0.51, usual range 3-4.     -Osteopenia.Left hip T score -2.4. Discussed prolia and reclast with Rheumatologist.     -Symmetrical hand inflammation/pain. Also all-over body pain. - recent eval this month with Rheumatology, Dr. Faith. Labs completed for specialist but not available for review. Worked with an IPT therapist who helped a lot, and gave her exercises. She is also doing PT for her neck/shoulder stiffness.    -She takes a variety of supplements.        Past Medical History:   Diagnosis Date    Anxiety     Asthma     Coronary artery disease     Hyperlipidemia     Hypothyroidism      Past Surgical History:   Procedure Laterality Date    ADENOIDECTOMY      CARDIAC CATHETERIZATION  1976    TONSILLECTOMY       Social History     Social History Narrative    Lives alone, , has a daughter         Active - walks on treadmill, has dog - walks him regularly      Works as realtor     Family History   Problem Relation Age of Onset    Heart  attack Biological Mother     Stroke Biological Mother     Heart attack Biological Father      Epinephrine and Procaine  Current Outpatient Medications   Medication Sig Dispense Refill    albuterol HFA (VENTOLIN HFA) 90 mcg/actuation inhaler INHALE TWO (2) PUFFS BY MOUTH EVERY SIX (6) HOURS 1 each 3    cetirizine (ZyrTEC) 10 mg tablet Take 1 tablet (10 mg total) by mouth nightly. (Patient taking differently: Take 10 mg by mouth nightly as needed.) 90 tablet 1    cholecalciferol, vitamin D3, (VITAMIN D3) 2,000 unit tablet Take 2,000 Units by mouth daily.      ezetimibe (ZETIA) 10 mg tablet TAKE ONE (1) TABLET (10 MG TOTAL) BY MOUTH THREE (3) (THREE) TIMES A WEEK (TUE, THU, SAT). 39 tablet 3    levothyroxine (SYNTHROID) 100 mcg tablet TAKE ONE TABLET BY MOUTH ONCE DAILY 90 tablet 3    montelukast (SINGULAIR) 10 mg tablet TAKE ONE (1) TABLET (10 MG TOTAL) BY MOUTH NIGHTLY. 90 tablet 3    niacin (ENDUR-ACIN) 500 mg CR tablet Take 2 tablets (1,000 mg total) by mouth nightly. 180 tablet 3    verapamiL (CALAN) 40 mg tablet TAKE ONE TABLET BY MOUTH ONCE DAILY WHEN NEEDED 90 tablet 3    verapamil SR (CALAN-SR) 240 mg CR tablet TAKE ONE (1) CAPSULE (240 MG TOTAL) BY MOUTH NIGHTLY. 90 tablet 3    coenzyme Q10 (CO Q-10) 10 mg capsule Take 10 mg by mouth daily.      DOCOSAHEXANOIC ACID/EPA (FISH OIL ORAL) Take 3,000 mg by mouth daily.      famotidine (PEPCID) 20 mg tablet TAKE ONE TABLET BY MOUTH ONCE DAILY 90 tablet 3    rosuvastatin (CRESTOR) 5 mg tablet Take 1 tablet (5 mg total) by mouth 4 (four) times a week (Mon, Wed, Fri, Sat). 52 tablet 3     No current facility-administered medications for this visit.       Review of Systems   Respiratory: Negative for chest tightness and shortness of breath.    Cardiovascular: Positive for palpitations. Negative for chest pain.   Gastrointestinal: Negative.    Musculoskeletal: Positive for arthralgias.   Neurological: Negative for weakness.       Objective   Vitals:     11/28/23 1031   BP: 120/86   Pulse: 70   Resp: 14   Temp: 36.6 °C (97.8 °F)   TempSrc: Temporal   SpO2: 97%     There is no height or weight on file to calculate BMI.    Physical Exam  Vitals reviewed.   Constitutional:       General: She is not in acute distress.  Cardiovascular:      Rate and Rhythm: Normal rate and regular rhythm.      Pulses: Normal pulses.      Heart sounds: Normal heart sounds. No murmur heard.  Pulmonary:      Effort: Pulmonary effort is normal.      Breath sounds: Normal breath sounds.   Musculoskeletal:         General: Normal range of motion.   Skin:     General: Skin is warm.   Neurological:      General: No focal deficit present.      Mental Status: She is alert and oriented to person, place, and time.   Psychiatric:         Mood and Affect: Mood normal.                 Assessment   Problem List Items Addressed This Visit        Respiratory    Asthma     Treated with Singulair. Rare use of Ventolin.              Circulatory    Supraventricular tachycardia     Verapamil  mg daily, and has PRN Verapamil 40 mg.  No recurrences over the past year.  Continue care with Cardiology.         Murmur     Aortic stenosis murmur.   Last echo 6/28/23 - mild to moderate.  Asymptomatic.  Follows regularly with Cardiology.         Nonrheumatic aortic valve stenosis     Mild to moderate on Echocardiogram 6/28/23. Asymptomatic. Continue care with Cardiologists.         Atherosclerosis of aorta (CMS/HCC) - Primary       Endocrine/Metabolic    Hypothyroidism     Script to repeat TSH provided when she completes labs for her Cardiologist this winter.         Relevant Orders    TSH w reflex FT4       Other    Polygenic hypercholesterolemia     Continue medical therapy with Dr. Saucedo.                Kylee Hair MD  11/28/2023       I spent 45 minutes on this date of service performing the following activities: obtaining history, performing examination, entering orders, documenting,  preparing for visit, obtaining / reviewing records and providing counseling and education.

## 2023-11-28 ENCOUNTER — OFFICE VISIT (OUTPATIENT)
Dept: FAMILY MEDICINE | Facility: CLINIC | Age: 73
End: 2023-11-28
Payer: MEDICARE

## 2023-11-28 VITALS
SYSTOLIC BLOOD PRESSURE: 120 MMHG | OXYGEN SATURATION: 97 % | TEMPERATURE: 97.8 F | HEART RATE: 70 BPM | DIASTOLIC BLOOD PRESSURE: 86 MMHG | RESPIRATION RATE: 14 BRPM

## 2023-11-28 DIAGNOSIS — J45.909 PERSISTENT ASTHMA WITHOUT COMPLICATION, UNSPECIFIED ASTHMA SEVERITY: ICD-10-CM

## 2023-11-28 DIAGNOSIS — I47.10 SUPRAVENTRICULAR TACHYCARDIA (CMS/HCC): ICD-10-CM

## 2023-11-28 DIAGNOSIS — E78.00 POLYGENIC HYPERCHOLESTEROLEMIA: ICD-10-CM

## 2023-11-28 DIAGNOSIS — I35.0 NONRHEUMATIC AORTIC VALVE STENOSIS: ICD-10-CM

## 2023-11-28 DIAGNOSIS — R01.1 MURMUR: ICD-10-CM

## 2023-11-28 DIAGNOSIS — I70.0 ATHEROSCLEROSIS OF AORTA (CMS/HCC): Primary | ICD-10-CM

## 2023-11-28 DIAGNOSIS — E03.9 ACQUIRED HYPOTHYROIDISM: ICD-10-CM

## 2023-11-28 PROBLEM — E88.819 INSULIN RESISTANCE: Status: RESOLVED | Noted: 2019-11-04 | Resolved: 2023-11-28

## 2023-11-28 PROCEDURE — 99215 OFFICE O/P EST HI 40 MIN: CPT | Performed by: SURGERY

## 2023-11-28 ASSESSMENT — ENCOUNTER SYMPTOMS
SHORTNESS OF BREATH: 0
ARTHRALGIAS: 1
CHEST TIGHTNESS: 0
WEAKNESS: 0
GASTROINTESTINAL NEGATIVE: 1
PALPITATIONS: 1

## 2023-11-28 NOTE — ASSESSMENT & PLAN NOTE
Verapamil  mg daily, and has PRN Verapamil 40 mg.  No recurrences over the past year.  Continue care with Cardiology.

## 2023-11-28 NOTE — ASSESSMENT & PLAN NOTE
Aortic stenosis murmur.   Last echo 6/28/23 - mild to moderate.  Asymptomatic.  Follows regularly with Cardiology.

## 2023-11-30 NOTE — TELEPHONE ENCOUNTER
The patient just got off the phone with the Surgeon and they informed the patient what was sent is not correct. Patient would like a call back from [facility].        Detail Level: Detailed Was A Bandage Applied: Yes Punch Size In Mm: 4 Size Of Lesion In Cm (Optional): 0 Depth Of Punch Biopsy: dermis Biopsy Type: H and E Anesthesia Type: 1% lidocaine with epinephrine Anesthesia Volume In Cc: 0.5 Hemostasis: None Epidermal Sutures: none, closed by secondary intention Wound Care: Petrolatum Dressing: bandage Patient Will Remove Sutures At Home?: No Lab: [id number] Consent: Written consent was obtained and risks were reviewed including but not limited to scarring, infection, bleeding, scabbing, incomplete removal, nerve damage and allergy to anesthesia. Post-Care Instructions: I reviewed with the patient in detail post-care instructions. Patient is to keep the biopsy site dry overnight, and then apply bacitracin twice daily until healed. Patient may apply hydrogen peroxide soaks to remove any crusting. Home Suture Removal Text: Patient was provided a home suture removal kit and will remove their sutures at home.  If they have any questions or difficulties they will call the office. Notification Instructions: Patient will be notified of biopsy results. However, patient instructed to call the office if not contacted within 2 weeks. Billing Type: Third-Party Bill Information: Selecting Yes will display possible errors in your note based on the variables you have selected. This validation is only offered as a suggestion for you. PLEASE NOTE THAT THE VALIDATION TEXT WILL BE REMOVED WHEN YOU FINALIZE YOUR NOTE. IF YOU WANT TO FAX A PRELIMINARY NOTE YOU WILL NEED TO TOGGLE THIS TO 'NO' IF YOU DO NOT WANT IT IN YOUR FAXED NOTE.

## 2023-12-04 NOTE — TELEPHONE ENCOUNTER
Patient calling in regarding 2 thing.    1 - can the ZIO report be uploaded to her mychart or can you email it to the email on file which I verified is correct.    2 - patient would like to speak to Dr. Muse regarding starting another medication.  There was a medication discussion last time they talked which is noted in this encounter from another day. She said she is still having issues and not sleeping well and that is giving her more anxiety.    Patient can be reached at [phone]

## 2023-12-05 NOTE — TELEPHONE ENCOUNTER
Noted. I will call her later today.  Can you find out about uploading ZIO report to My Chart?  Not sure if we can email it due to HIPAA?  Thanks

## 2023-12-06 NOTE — TELEPHONE ENCOUNTER
I called the patient and discussed increasing her dose of verapamil at night for palpitations in the evening.  We will talk again later today since she was driving.

## 2023-12-07 NOTE — TELEPHONE ENCOUNTER
I call the patient back at 5:00 PM today. After discussions for options, I advised her to take at bedtime the short acting verapamil 40 mg milligrams and if tolerated increase to verapamil 80 mg added to the verapamil 240 mg which she takes every night.  She does not want to take beta-blocker therapy due to side effects in the past.  She will contact me to give me an update to see if the extra verapamil is helping her with her symptoms of palpitations at night.

## 2023-12-20 NOTE — PROGRESS NOTES
[facility]  1/3/2024    Kylee Woodson MD  [address]  [place] [address]    Re:  KELLY MEEKS  :  1950      Dear Massiel:    It was my pleasure to see your patient, Kelly Meeks, in the [facility] today.  As you know, we follow her for polygenic hypercholesterolemia, elevated LP(a), statin intolerance and mild aortic valvular sclerosis.    Clinically, she has been doing well.  She denies any chest pain, shortness of breath or palpitations.    As you know, she has undergone previous cardiac workup.  Her coronary calcium score was 0 in May 2016.  She did have a moderate amount of calcification involving in the thoracic aorta as well as the carotid distribution.  Given her family history of a father who had a myocardial infarction in his 30's and a mother who had a stroke at age 72, we elected to begin primary prevention therapy.    Her initial lipid panel revealed a total cholesterol of 228, , HDL 62 and triglycerides 102.  This is consistent with polygenic hypercholesterolemia.  She initiated on combination therapy with atorvastatin 10 mg alternating with Zetia 10 mg.  Her total cholesterol is down to 208, , apoB 99, LDL-P 1197, a reasonable response to this therapy.  Of note, her LP(a) was elevated at 420 nmol/L.  This prompted a discussion and we will begin niacin with Endur-Acin 500 mg at the evening meal.    Over the past year the patient has been on the keto diet.  She is markedly increased her saturated fats.  Her repeat lipid panel reflects this increased saturated fat intake.  Total cholesterol 218  HDL 73 triglycerides 73 APO B 121 and LDL P 1568.  We had a lengthy discussion  concerning cutting back on her saturated fat intake.  She needs to follow a low carbohydrate low saturated fat Mediterranean diet and increase her exercise program.    We recommended that she undergo a repeat coronary calcium score.  Her repeat calcium  score in April 2019 was 0.  She remains in a very low low risk quartile.  We discussed her prevention program in detail.  Her repeat lipid panel revealed a total cholesterol 227  HDL 75 and triglycerides 70.  We both agreed to switch her from Lipitor to Crestor at 5 mg Monday Wednesday Friday.  We also agreed to increase her Endur-Acin 1000 mg the evening meal.      We reviewed her lipid panel from May 2022.  Her total cholesterol was 185 LDL 98 HDL 79 and triglycerides 41.  Certainly an excellent response to the use of Crestor 5 mg alternating with Zetia 10 mg and Endur-Acin 1000 mg.    She does carry history of elevated LP(a) at 420 nmol/L.  Eventually she will be a candidate for the new injectable medication for LP(a) once it is available in the clinic.  Her echocardiogram 2023 demonstrates mild to moderate aortic valvular stenosis..    She noted chest pain in 2021.  She underwent a stress echo in 2021.  Her stress echo was negative for myocardial ischemia or scar.  Her echo demonstrated that she has mild aortic valvular stenosis.  Her repeat echocardiogram performed in the office June 2023 demonstrates mild to moderate aortic valvular stenosis with a peak velocity of 2.8 m/s and mean gradient of 17 mmHg.    We discussed her lipid panel June 2023.  Her total cholesterol was 235   triglycerides 69.  She has been following the Dr. Garzon diet.  This is a low lectin diet.  We increased her Crestor to 5 mg 4 days weekly and continue her Zetia 10 mg 3 times weekly.  Our goal being LDL cholesterol below 100.    We reviewed her most recent lipid panel as noted below.  Her LDL is satisfactory and below 100.  We are making no substantial changes today.      PAST MEDICAL HISTORY:    Coronary calcium score 0, May 2016.  2019.  Stress echo in 2021 was a normal study.    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 (Patient taking differently: Take 10 mg by mouth nightly as needed.), Disp: 90 tablet, Rfl: 1  •  cholecalciferol, vitamin D3, (VITAMIN D3) 2,000 unit tablet, Take 2,000 Units by mouth daily., Disp: , Rfl:   •  coenzyme Q10 (CO Q-10) 10 mg capsule, Take 10 mg by mouth daily., Disp: , Rfl:   •  DOCOSAHEXANOIC ACID/EPA (FISH OIL ORAL), Take 3,000 mg by mouth daily., Disp: , Rfl:   •  ezetimibe (ZETIA) 10 mg tablet, TAKE ONE (1) TABLET (10 MG TOTAL) BY MOUTH THREE (3) (THREE) TIMES A WEEK (TUE, THU, SAT)., Disp: 39 tablet, Rfl: 3  •  famotidine (PEPCID) 20 mg tablet, TAKE ONE TABLET BY MOUTH ONCE DAILY, Disp: 90 tablet, Rfl: 3  •  levothyroxine (SYNTHROID) 100 mcg tablet, TAKE ONE TABLET BY MOUTH ONCE DAILY, Disp: 90 tablet, Rfl: 3  •  montelukast (SINGULAIR) 10 mg tablet, TAKE ONE (1) TABLET (10 MG TOTAL) BY MOUTH NIGHTLY., Disp: 90 tablet, Rfl: 3  •  niacin (ENDUR-ACIN) 500 mg CR tablet, Take 2 tablets (1,000 mg total) by mouth nightly., Disp: 180 tablet, Rfl: 3  •  rosuvastatin (CRESTOR) 5 mg tablet, Take 1 tablet (5 mg total) by mouth 4 (four) times a week (Mon, Wed, Fri, Sat)., Disp: 52 tablet, Rfl: 3  •  verapamiL (CALAN) 40 mg tablet, TAKE ONE TABLET BY MOUTH ONCE DAILY WHEN NEEDED (Patient taking differently: Take 40 mg by mouth daily. TAKE  ONE TABLET BY MOUTH ONCE DAILY WHEN NEEDED), Disp: 90 tablet, Rfl: 3  •  verapamil SR (CALAN-SR) 240 mg CR tablet, TAKE ONE (1) CAPSULE (240 MG TOTAL) BY MOUTH NIGHTLY., Disp: 90 tablet, Rfl: 3    SUPPLEMENTS:  Omega-3 fish oil, vitamin-D, coenzyme-Q10, Reservitol, curcumin, per Dr. Adrian Yang's recommendations, Endur-Acin 500 mg at dinner.    ALLERGIES:  High-dose statins.  Lipitor 20 mg daily caused significant myalgias.    FAMILY HISTORY:  Father had multiple myocardial infarctions in his 30's.  Mother with possible coronary artery disease and stroke.  Brother with hypertension.    SOCIAL HISTORY:  The patient lives with her adopted daughter.  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  Occasional glass of wine.  She quit smoking 15 years ago.    REVIEW OF SYSTEMS:  The patient's palpitations have been well controlled with the use of Verelan.  She follows with Dr. Ofelia Muse.  She has had difficulty with taking high-dose statins as they lead to symptoms of myalgias and leg pain.  She has a history of asthma and allergies.    PHYSICAL EXAMINATION:  The patient is a middle-aged female in no acute distress.    Vitals:    01/03/24 1126   BP: 130/78   Pulse: (!) 59   Resp: 18   SpO2: 94%     Wt Readings from Last 3 Encounters:   01/03/24 65.8 kg (145 lb)   09/28/23 65.8 kg (145 lb)   06/28/23 66.7 kg (147 lb)     HEENT:  Unremarkable.  No xanthelasma or arcus.  Neck:  Supple, no JVD.  Lungs:  Clear to auscultation and percussion.  Cardiac:  Regular rate and rhythm without murmur or gallop.  Abdomen:  Soft, bowel sounds present, no organomegaly.  Extremities:  No edema, pulses intact.  Skin:  Warm and dry.  Neuro:  Alert and oriented X 3.    LABORATORY DATA:      Coronary score:  0, 2016, 0 in 2019 , thoracic aortic calcification.      Carotid ultrasound 2019:  Right Carotid Mild plaque  Normal velocities  Antegrade vertebral   Left Carotid Mild plaque  Normal  velocities  Antegrade vertebral     Stress echocardiogram April 2021:  · An exercise stress test was performed following the Richard protocol. The patient reported no symptoms and no angina during the stress test.  · There was no ST segment deviation noted during stress. Arrhythmias noted during stress: rare PVCs .There were no arrhythmias during recovery.  · Stress ECG does not meet criteria for ischemia.  · Post-stress echocardiogram does not meet criteria for ischemia. All ventricular walls become hyperdynamic and the ejection fraction improves.  · Normal ventricle size. Normal wall thickness. Preserved systolic function. Estimated EF 65%. Normal septal motion. No regional wall motion abnormalities. Grade I diastolic dysfunction. Diastolic inflow pattern consistent with impaired relaxation.  · Tricuspid aortic valve. Calcification of the aortic valve leaflets. No evidence of aortic valve regurgitation. . Mild aortic valve stenosis. AV mean gradient = 12.00 mmHg.  · Stress echocardiogram does not meet criteria for myocardial ischemia.    Echocardiogram June 2023:  •  Left Ventricle: Normal ventricle size. Normal wall thickness. Estimated EF 65-70%. No regional wall motion abnormalities. Grade I diastolic dysfunction.  •  Aortic Valve: Tricuspid valve.  Calcification present with reduced excursion. Trace regurgitation. Mild to moderate stenosis. The peak aortic velocity was measured in the apical view. Peak velocity = 2.79 m/s. Mean gradient = 17.00 mmHg. Calculated area by cont eq = 1.43 cm2. Calculated dimensionless index = 0.50.  •  Aorta: Aortic root normal. Sinuses of Valsalva normal-sized. Ascending aorta normal-sized.  •  Mitral Valve: Normal leaflet structure. Mild regurgitation.  •  Left Atrium: Mildly dilated atrium.  •  Right Atrium: Mildly dilated atrium.  •  Tricuspid Valve: Normal structure. Mild regurgitation. Estimated RVSP = 31 mmHg.  •  Right Ventricle: Normal ventricle size. Normal systolic  function.  •  Pulmonic Valve: Normal structure. Trace regurgitation.  •  IVC/SVC: Inferior vena cava is dilated (>2.1cm). Inferior vena cava demonstrates normal respiratory collapse.  •  Pericardium: Normal structure.     Advanced lipid panel March 2019:  Total cholesterol 218  HDL 73 triglycerides 73 APO B 121.  LP(a) 473 nmol/L  Inflammation panel: Normal  Endothelial function panel: Abnormal  Metabolic panel: Vitamin D 69   Sterol panel: Hyper absorber of cholesterol.  Normal synthesizer of cholesterol.  Diabetes panel: Insulin resistance  Omega-3 index: 9.1    Lipid panels:  Component      Latest Ref Rng 5/27/2022 6/27/2023 12/28/2023   Triglycerides      0 - 149 mg/dL 41  69  48    Cholesterol      100 - 199 mg/dL 185  235 (H)  164    HDL      >39 mg/dL 79  102  69    LDL Calculated      0 - 99 mg/dL 98  119 (H)  85    VLDL Cholesterol Lex      5 - 40 mg/dL   10           IMPRESSIONS/RECOMMENDATIONS:    1. Cardiovascular risk assessment.  The patient's coronary calcium score in 2016 and 2019 remains 0 which places her in a low risk category.   2. Thoracic aortic and carotid arterial calcification.  The patient is at risk for progressive atherosclerosis.   3. Polygenic hypercholesterolemia.  Continue Crestor 5 mg and  Zetia 10 mg.    4. Elevated LP(a).  Continue Endur-Acin.  5. Vitamin-D deficiency.  Continue vitamin-D supplementation.  6. Statin intolerance. She is tolerating Crestor 5mg 4 times per week.   7. Hyperabsorber of cholesterol.  Continue Zetia.  8. Hypothyroidism.  Continue Synthroid.  9.         Paroxysmal supraventricular tachycardia.  The patient is followed by Dr. Ofelia Muse.  She will continue verapamil.  10.       Mild to moderate aortic valvular stenosis.  The patient's echocardiogram suggest moderate aortic valvular stenosis with a mean gradient of 17 mmHg.    Summary:     Kelly is demonstrating cardiovascular stability.     Her recent stress echo 2021 performed in the office  demonstrated normal blood pressure and heart rhythm response to exercise.  Study was negative for ischemia.  She had no chest pain.  The study did demonstrate she has mild/moderate aortic valvular stenosis.      Repeat echocardiogram performed  June 2023 demonstrates mild to moderate aortic valvular stenosis with a mean gradient of 17 mmHg.  Her peak velocity is 2.8 m/s.    She is now followed by Dr. Muse for her PSVT.  She will continue verapamil.      Her carotid ultrasound demonstrates bilateral carotid arteries sclerosis.  We have initiated lipid-lowering therapy.  She will continue with Crestor 5 mg and Zetia 10 mg on the schedule noted above..  She will continue with Endur-Acin for her elevated LP(a).    She is now following a Dr. Garzon low lectin diet.    This is a 30-minute patient encounter with greater than 50% time spent in care coordination counseling.      Sincerely,    Nilson Saucedo MD  1/3/2024

## 2023-12-29 LAB
ALBUMIN SERPL-MCNC: 4 G/DL (ref 3.8–4.8)
ALBUMIN/GLOB SERPL: 1.9 {RATIO} (ref 1.2–2.2)
ALP SERPL-CCNC: 70 IU/L (ref 44–121)
ALT SERPL-CCNC: 35 IU/L (ref 0–32)
AST SERPL-CCNC: 28 IU/L (ref 0–40)
BILIRUB SERPL-MCNC: 0.3 MG/DL (ref 0–1.2)
BUN SERPL-MCNC: 20 MG/DL (ref 8–27)
BUN/CREAT SERPL: 22 (ref 12–28)
CALCIUM SERPL-MCNC: 9.3 MG/DL (ref 8.7–10.3)
CHLORIDE SERPL-SCNC: 106 MMOL/L (ref 96–106)
CHOLEST SERPL-MCNC: 164 MG/DL (ref 100–199)
CO2 SERPL-SCNC: 22 MMOL/L (ref 20–29)
CREAT SERPL-MCNC: 0.89 MG/DL (ref 0.57–1)
EGFRCR SERPLBLD CKD-EPI 2021: 68 ML/MIN/1.73
GLOBULIN SER CALC-MCNC: 2.1 G/DL (ref 1.5–4.5)
GLUCOSE SERPL-MCNC: 94 MG/DL (ref 70–99)
HDLC SERPL-MCNC: 69 MG/DL
LDLC SERPL CALC-MCNC: 85 MG/DL (ref 0–99)
POTASSIUM SERPL-SCNC: 4.3 MMOL/L (ref 3.5–5.2)
PROT SERPL-MCNC: 6.1 G/DL (ref 6–8.5)
SODIUM SERPL-SCNC: 141 MMOL/L (ref 134–144)
T4 FREE SERPL-MCNC: 1.41 NG/DL (ref 0.82–1.77)
TRIGL SERPL-MCNC: 48 MG/DL (ref 0–149)
TSH SERPL DL<=0.005 MIU/L-ACNC: 1.17 UIU/ML (ref 0.45–4.5)
VLDLC SERPL CALC-MCNC: 10 MG/DL (ref 5–40)

## 2024-01-03 ENCOUNTER — OFFICE VISIT (OUTPATIENT)
Dept: CARDIOLOGY | Facility: CLINIC | Age: 74
End: 2024-01-03
Payer: MEDICARE

## 2024-01-03 VITALS
SYSTOLIC BLOOD PRESSURE: 130 MMHG | RESPIRATION RATE: 18 BRPM | HEIGHT: 62 IN | DIASTOLIC BLOOD PRESSURE: 78 MMHG | HEART RATE: 59 BPM | OXYGEN SATURATION: 94 % | BODY MASS INDEX: 26.68 KG/M2 | WEIGHT: 145 LBS

## 2024-01-03 DIAGNOSIS — I65.23 ARTERIOSCLEROSIS OF BOTH CAROTID ARTERIES: ICD-10-CM

## 2024-01-03 DIAGNOSIS — E78.41 ELEVATED LP(A): ICD-10-CM

## 2024-01-03 DIAGNOSIS — I35.0 NONRHEUMATIC AORTIC VALVE STENOSIS: ICD-10-CM

## 2024-01-03 DIAGNOSIS — E03.8 OTHER SPECIFIED HYPOTHYROIDISM: Primary | ICD-10-CM

## 2024-01-03 DIAGNOSIS — E78.00 POLYGENIC HYPERCHOLESTEROLEMIA: ICD-10-CM

## 2024-01-03 DIAGNOSIS — R00.2 PALPITATION: ICD-10-CM

## 2024-01-03 DIAGNOSIS — I47.10 SUPRAVENTRICULAR TACHYCARDIA (CMS/HCC): ICD-10-CM

## 2024-01-03 DIAGNOSIS — E78.5 DYSLIPIDEMIA: ICD-10-CM

## 2024-01-03 PROCEDURE — 93000 ELECTROCARDIOGRAM COMPLETE: CPT | Performed by: INTERNAL MEDICINE

## 2024-01-03 PROCEDURE — 99214 OFFICE O/P EST MOD 30 MIN: CPT | Performed by: INTERNAL MEDICINE

## 2024-01-03 NOTE — LETTER
January 3, 2024     Kylee Hair MD  [address]  [address]  [facility] [id number]    Patient: Kelly Meesk  YOB: 1950  Date of Visit: 1/3/2024      Dear Dr. Deacon Hair:    Thank you for referring Kelly Meeks to me for evaluation. Below are my notes for this consultation.    If you have questions, please do not hesitate to call me. I look forward to following your patient along with you.         Sincerely,        Nilson Saucedo MD        CC: No Recipients    Nilson Saucedo MD  1/3/2024 12:43 PM  Signed  [facility]  1/3/2024    Kylee Woodson MD  [address]  [facility] [id number]    Re:  KELLY MEEKS  :  1950      Dear Massiel:    It was my pleasure to see your patient, Kelly Meeks, in the [facility] today.  As you know, we follow her for polygenic hypercholesterolemia, elevated LP(a), statin intolerance and mild aortic valvular sclerosis.    Clinically, she has been doing well.  She denies any chest pain, shortness of breath or palpitations.    As you know, she has undergone previous cardiac workup.  Her coronary calcium score was 0 in May 2016.  She did have a moderate amount of calcification involving in the thoracic aorta as well as the carotid distribution.  Given her family history of a father who had a myocardial infarction in his 30's and a mother who had a stroke at age 72, we elected to begin primary prevention therapy.    Her initial lipid panel revealed a total cholesterol of 228, , HDL 62 and triglycerides 102.  This is consistent with polygenic hypercholesterolemia.  She initiated on combination therapy with atorvastatin 10 mg alternating with Zetia 10 mg.  Her total cholesterol is down to 208, , apoB 99, LDL-P 1197, a reasonable response to this therapy.  Of note, her LP(a) was elevated at 420 nmol/L.  This prompted a discussion and we will begin niacin with Endur-Acin 500 mg at the  evening meal.    Over the past year the patient has been on the keto diet.  She is markedly increased her saturated fats.  Her repeat lipid panel reflects this increased saturated fat intake.  Total cholesterol 218  HDL 73 triglycerides 73 APO B 121 and LDL P 1568.  We had a lengthy discussion  concerning cutting back on her saturated fat intake.  She needs to follow a low carbohydrate low saturated fat Mediterranean diet and increase her exercise program.    We recommended that she undergo a repeat coronary calcium score.  Her repeat calcium score in April 2019 was 0.  She remains in a very low low risk quartile.  We discussed her prevention program in detail.  Her repeat lipid panel revealed a total cholesterol 227  HDL 75 and triglycerides 70.  We both agreed to switch her from Lipitor to Crestor at 5 mg Monday Wednesday Friday.  We also agreed to increase her Endur-Acin 1000 mg the evening meal.      We reviewed her lipid panel from May 2022.  Her total cholesterol was 185 LDL 98 HDL 79 and triglycerides 41.  Certainly an excellent response to the use of Crestor 5 mg alternating with Zetia 10 mg and Endur-Acin 1000 mg.    She does carry history of elevated LP(a) at 420 nmol/L.  Eventually she will be a candidate for the new injectable medication for LP(a) once it is available in the clinic.  Her echocardiogram 2023 demonstrates mild to moderate aortic valvular stenosis..    She noted chest pain in 2021.  She underwent a stress echo in 2021.  Her stress echo was negative for myocardial ischemia or scar.  Her echo demonstrated that she has mild aortic valvular stenosis.  Her repeat echocardiogram performed in the office June 2023 demonstrates mild to moderate aortic valvular stenosis with a peak velocity of 2.8 m/s and mean gradient of 17 mmHg.    We discussed her lipid panel June 2023.  Her total cholesterol was 235   triglycerides 69.  She has been following the Dr. Duran san.  This  is a low lectin diet.  We increased her Crestor to 5 mg 4 days weekly and continue her Zetia 10 mg 3 times weekly.  Our goal being LDL cholesterol below 100.    We reviewed her most recent lipid panel as noted below.  Her LDL is satisfactory and below 100.  We are making no substantial changes today.      PAST MEDICAL HISTORY:    Coronary calcium score 0, May 2016.  2019.  Stress echo in 2021 was a normal study.    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 (Patient taking differently: Take 10 mg by mouth nightly as needed.), Disp: 90 tablet, Rfl: 1  •  cholecalciferol, vitamin D3, (VITAMIN D3) 2,000 unit tablet, Take 2,000 Units by mouth daily., Disp: , Rfl:   •  coenzyme Q10 (CO Q-10) 10 mg capsule, Take 10 mg by mouth daily., Disp: , Rfl:   •  DOCOSAHEXANOIC ACID/EPA (FISH OIL ORAL), Take 3,000 mg by mouth daily., Disp: , Rfl:   •  ezetimibe (ZETIA) 10 mg tablet, TAKE ONE (1) TABLET (10 MG TOTAL) BY MOUTH THREE (3) (THREE) TIMES A WEEK (TUE, THU, SAT)., Disp: 39 tablet, Rfl: 3  •  famotidine (PEPCID) 20 mg tablet, TAKE ONE TABLET BY MOUTH ONCE DAILY, Disp: 90 tablet, Rfl: 3  •  levothyroxine (SYNTHROID) 100 mcg tablet, TAKE ONE TABLET BY MOUTH ONCE DAILY,  Disp: 90 tablet, Rfl: 3  •  montelukast (SINGULAIR) 10 mg tablet, TAKE ONE (1) TABLET (10 MG TOTAL) BY MOUTH NIGHTLY., Disp: 90 tablet, Rfl: 3  •  niacin (ENDUR-ACIN) 500 mg CR tablet, Take 2 tablets (1,000 mg total) by mouth nightly., Disp: 180 tablet, Rfl: 3  •  rosuvastatin (CRESTOR) 5 mg tablet, Take 1 tablet (5 mg total) by mouth 4 (four) times a week (Mon, Wed, Fri, Sat)., Disp: 52 tablet, Rfl: 3  •  verapamiL (CALAN) 40 mg tablet, TAKE ONE TABLET BY MOUTH ONCE DAILY WHEN NEEDED (Patient taking differently: Take 40 mg by mouth daily. TAKE ONE TABLET BY MOUTH ONCE DAILY WHEN NEEDED), Disp: 90 tablet, Rfl: 3  •  verapamil SR (CALAN-SR) 240 mg CR tablet, TAKE ONE (1) CAPSULE (240 MG TOTAL) BY MOUTH NIGHTLY., Disp: 90 tablet, Rfl: 3    SUPPLEMENTS:  Omega-3 fish oil, vitamin-D, coenzyme-Q10, Reservitol, curcumin, per Dr. Adrian Yang's recommendations, Endur-Acin 500 mg at dinner.    ALLERGIES:  High-dose statins.  Lipitor 20 mg daily caused significant myalgias.    FAMILY HISTORY:  Father had multiple myocardial infarctions in his 30's.  Mother with possible coronary artery disease and stroke.  Brother with hypertension.    SOCIAL HISTORY:  The patient lives with her adopted daughter.  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  Occasional glass of wine.  She quit smoking 15 years ago.    REVIEW OF SYSTEMS:  The patient's palpitations have been well controlled with the use of Verelan.  She follows with Dr. Ofelia Muse.  She has had difficulty with taking high-dose statins as they lead to symptoms of myalgias and leg pain.  She has a history of asthma and allergies.    PHYSICAL EXAMINATION:  The patient is a middle-aged female in no acute distress.    Vitals:    01/03/24 1126   BP: 130/78   Pulse: (!) 59   Resp: 18   SpO2: 94%     Wt Readings from Last 3 Encounters:   01/03/24 65.8 kg (145 lb)   09/28/23 65.8 kg (145 lb)   06/28/23 66.7 kg (147 lb)     HEENT:   Unremarkable.  No xanthelasma or arcus.  Neck:  Supple, no JVD.  Lungs:  Clear to auscultation and percussion.  Cardiac:  Regular rate and rhythm without murmur or gallop.  Abdomen:  Soft, bowel sounds present, no organomegaly.  Extremities:  No edema, pulses intact.  Skin:  Warm and dry.  Neuro:  Alert and oriented X 3.    LABORATORY DATA:      Coronary score:  0, 2016, 0 in 2019 , thoracic aortic calcification.      Carotid ultrasound 2019:  Right Carotid Mild plaque  Normal velocities  Antegrade vertebral   Left Carotid Mild plaque  Normal velocities  Antegrade vertebral     Stress echocardiogram April 2021:  · An exercise stress test was performed following the Richard protocol. The patient reported no symptoms and no angina during the stress test.  · There was no ST segment deviation noted during stress. Arrhythmias noted during stress: rare PVCs .There were no arrhythmias during recovery.  · Stress ECG does not meet criteria for ischemia.  · Post-stress echocardiogram does not meet criteria for ischemia. All ventricular walls become hyperdynamic and the ejection fraction improves.  · Normal ventricle size. Normal wall thickness. Preserved systolic function. Estimated EF 65%. Normal septal motion. No regional wall motion abnormalities. Grade I diastolic dysfunction. Diastolic inflow pattern consistent with impaired relaxation.  · Tricuspid aortic valve. Calcification of the aortic valve leaflets. No evidence of aortic valve regurgitation. . Mild aortic valve stenosis. AV mean gradient = 12.00 mmHg.  · Stress echocardiogram does not meet criteria for myocardial ischemia.    Echocardiogram June 2023:  •  Left Ventricle: Normal ventricle size. Normal wall thickness. Estimated EF 65-70%. No regional wall motion abnormalities. Grade I diastolic dysfunction.  •  Aortic Valve: Tricuspid valve.  Calcification present with reduced excursion. Trace regurgitation. Mild to moderate stenosis. The peak aortic velocity was  measured in the apical view. Peak velocity = 2.79 m/s. Mean gradient = 17.00 mmHg. Calculated area by cont eq = 1.43 cm2. Calculated dimensionless index = 0.50.  •  Aorta: Aortic root normal. Sinuses of Valsalva normal-sized. Ascending aorta normal-sized.  •  Mitral Valve: Normal leaflet structure. Mild regurgitation.  •  Left Atrium: Mildly dilated atrium.  •  Right Atrium: Mildly dilated atrium.  •  Tricuspid Valve: Normal structure. Mild regurgitation. Estimated RVSP = 31 mmHg.  •  Right Ventricle: Normal ventricle size. Normal systolic function.  •  Pulmonic Valve: Normal structure. Trace regurgitation.  •  IVC/SVC: Inferior vena cava is dilated (>2.1cm). Inferior vena cava demonstrates normal respiratory collapse.  •  Pericardium: Normal structure.     Advanced lipid panel March 2019:  Total cholesterol 218  HDL 73 triglycerides 73 APO B 121.  LP(a) 473 nmol/L  Inflammation panel: Normal  Endothelial function panel: Abnormal  Metabolic panel: Vitamin D 69   Sterol panel: Hyper absorber of cholesterol.  Normal synthesizer of cholesterol.  Diabetes panel: Insulin resistance  Omega-3 index: 9.1    Lipid panels:  Component      Latest Ref Rng 5/27/2022 6/27/2023 12/28/2023   Triglycerides      0 - 149 mg/dL 41  69  48    Cholesterol      100 - 199 mg/dL 185  235 (H)  164    HDL      >39 mg/dL 79  102  69    LDL Calculated      0 - 99 mg/dL 98  119 (H)  85    VLDL Cholesterol Lex      5 - 40 mg/dL   10           IMPRESSIONS/RECOMMENDATIONS:    1. Cardiovascular risk assessment.  The patient's coronary calcium score in 2016 and 2019 remains 0 which places her in a low risk category.   2. Thoracic aortic and carotid arterial calcification.  The patient is at risk for progressive atherosclerosis.   3. Polygenic hypercholesterolemia.  Continue Crestor 5 mg and  Zetia 10 mg.    4. Elevated LP(a).  Continue Endur-Acin.  5. Vitamin-D deficiency.  Continue vitamin-D supplementation.  6. Statin intolerance. She is  tolerating Crestor 5mg 4 times per week.   7. Hyperabsorber of cholesterol.  Continue Zetia.  8. Hypothyroidism.  Continue Synthroid.  9.         Paroxysmal supraventricular tachycardia.  The patient is followed by Dr. Ofelia Muse.  She will continue verapamil.  10.       Mild to moderate aortic valvular stenosis.  The patient's echocardiogram suggest moderate aortic valvular stenosis with a mean gradient of 17 mmHg.    Summary:     Kelly is demonstrating cardiovascular stability.     Her recent stress echo 2021 performed in the office demonstrated normal blood pressure and heart rhythm response to exercise.  Study was negative for ischemia.  She had no chest pain.  The study did demonstrate she has mild/moderate aortic valvular stenosis.      Repeat echocardiogram performed  June 2023 demonstrates mild to moderate aortic valvular stenosis with a mean gradient of 17 mmHg.  Her peak velocity is 2.8 m/s.    She is now followed by Dr. Muse for her PSVT.  She will continue verapamil.      Her carotid ultrasound demonstrates bilateral carotid arteries sclerosis.  We have initiated lipid-lowering therapy.  She will continue with Crestor 5 mg and Zetia 10 mg on the schedule noted above..  She will continue with Endur-Acin for her elevated LP(a).    She is now following a Dr. Garzon low lectin diet.    This is a 30-minute patient encounter with greater than 50% time spent in care coordination counseling.      Sincerely,    Nilson Saucedo MD  1/3/2024

## 2024-03-20 RX ORDER — LEVOTHYROXINE SODIUM 100 UG/1
TABLET ORAL
Qty: 90 TABLET | Refills: 3 | Status: SHIPPED | OUTPATIENT
Start: 2024-03-20 | End: 2024-04-30 | Stop reason: SDUPTHER

## 2024-03-20 NOTE — TELEPHONE ENCOUNTER
Medicine Refill Request    Last Office Visit: 11/28/2023   Last Consult Visit: Visit date not found  Last Telemedicine Visit: 4/15/2021 Melonie Guy MD    Next Appointment: 4/30/2024      Current Outpatient Medications:   •  albuterol HFA (VENTOLIN HFA) 90 mcg/actuation inhaler, INHALE TWO (2) PUFFS BY MOUTH EVERY SIX (6) HOURS, Disp: 1 each, Rfl: 3  •  cetirizine (ZyrTEC) 10 mg tablet, Take 1 tablet (10 mg total) by mouth nightly. (Patient taking differently: Take 10 mg by mouth nightly as needed.), Disp: 90 tablet, Rfl: 1  •  cholecalciferol, vitamin D3, (VITAMIN D3) 2,000 unit tablet, Take 2,000 Units by mouth daily., Disp: , Rfl:   •  coenzyme Q10 (CO Q-10) 10 mg capsule, Take 10 mg by mouth daily., Disp: , Rfl:   •  DOCOSAHEXANOIC ACID/EPA (FISH OIL ORAL), Take 3,000 mg by mouth daily., Disp: , Rfl:   •  ezetimibe (ZETIA) 10 mg tablet, TAKE ONE (1) TABLET (10 MG TOTAL) BY MOUTH THREE (3) (THREE) TIMES A WEEK (TUE, THU, SAT)., Disp: 39 tablet, Rfl: 3  •  famotidine (PEPCID) 20 mg tablet, TAKE ONE TABLET BY MOUTH ONCE DAILY, Disp: 90 tablet, Rfl: 3  •  levothyroxine (SYNTHROID) 100 mcg tablet, TAKE ONE TABLET BY MOUTH ONCE DAILY, Disp: 90 tablet, Rfl: 3  •  montelukast (SINGULAIR) 10 mg tablet, TAKE ONE (1) TABLET (10 MG TOTAL) BY MOUTH NIGHTLY., Disp: 90 tablet, Rfl: 3  •  niacin (ENDUR-ACIN) 500 mg CR tablet, Take 2 tablets (1,000 mg total) by mouth nightly., Disp: 180 tablet, Rfl: 3  •  rosuvastatin (CRESTOR) 5 mg tablet, Take 1 tablet (5 mg total) by mouth 4 (four) times a week (Mon, Wed, Fri, Sat)., Disp: 52 tablet, Rfl: 3  •  verapamiL (CALAN) 40 mg tablet, TAKE ONE TABLET BY MOUTH ONCE DAILY WHEN NEEDED (Patient taking differently: Take 40 mg by mouth daily. TAKE ONE TABLET BY MOUTH ONCE DAILY WHEN NEEDED), Disp: 90 tablet, Rfl: 3  •  verapamil SR (CALAN-SR) 240 mg CR tablet, TAKE ONE (1) CAPSULE (240 MG TOTAL) BY MOUTH NIGHTLY., Disp: 90 tablet, Rfl: 3      BP Readings from Last 3 Encounters:  "  01/03/24 130/78   11/28/23 120/86   10/09/23 122/74       Recent Lab results:  Lab Results   Component Value Date    CHOL 164 12/28/2023   ,   Lab Results   Component Value Date    HDL 69 12/28/2023   ,   Lab Results   Component Value Date    LDLCALC 85 12/28/2023   ,   Lab Results   Component Value Date    TRIG 48 12/28/2023        Lab Results   Component Value Date    GLUCOSE 94 12/28/2023   , No results found for: \"HGBA1C\"      Lab Results   Component Value Date    CREATININE 0.89 12/28/2023       Lab Results   Component Value Date    TSH 1.170 12/28/2023         No results found for: \"HGBA1C\"  "

## 2024-04-30 ENCOUNTER — OFFICE VISIT (OUTPATIENT)
Dept: FAMILY MEDICINE | Facility: CLINIC | Age: 74
End: 2024-04-30
Payer: MEDICARE

## 2024-04-30 VITALS
RESPIRATION RATE: 12 BRPM | DIASTOLIC BLOOD PRESSURE: 70 MMHG | BODY MASS INDEX: 27.38 KG/M2 | SYSTOLIC BLOOD PRESSURE: 118 MMHG | WEIGHT: 148.8 LBS | HEART RATE: 67 BPM | TEMPERATURE: 97.9 F | HEIGHT: 62 IN | OXYGEN SATURATION: 97 %

## 2024-04-30 DIAGNOSIS — Z00.00 MEDICARE ANNUAL WELLNESS VISIT, SUBSEQUENT: Primary | ICD-10-CM

## 2024-04-30 DIAGNOSIS — I70.0 ATHEROSCLEROSIS OF AORTA (CMS/HCC): ICD-10-CM

## 2024-04-30 DIAGNOSIS — E78.41 ELEVATED LP(A): ICD-10-CM

## 2024-04-30 DIAGNOSIS — E03.9 ACQUIRED HYPOTHYROIDISM: ICD-10-CM

## 2024-04-30 DIAGNOSIS — K21.9 GASTROESOPHAGEAL REFLUX DISEASE, UNSPECIFIED WHETHER ESOPHAGITIS PRESENT: ICD-10-CM

## 2024-04-30 DIAGNOSIS — E78.5 DYSLIPIDEMIA: ICD-10-CM

## 2024-04-30 DIAGNOSIS — J45.909 PERSISTENT ASTHMA WITHOUT COMPLICATION, UNSPECIFIED ASTHMA SEVERITY: ICD-10-CM

## 2024-04-30 DIAGNOSIS — R01.1 MURMUR: ICD-10-CM

## 2024-04-30 DIAGNOSIS — I47.10 SUPRAVENTRICULAR TACHYCARDIA (CMS/HCC): ICD-10-CM

## 2024-04-30 DIAGNOSIS — I35.0 NONRHEUMATIC AORTIC VALVE STENOSIS: ICD-10-CM

## 2024-04-30 PROBLEM — E78.00 POLYGENIC HYPERCHOLESTEROLEMIA: Status: RESOLVED | Noted: 2023-10-02 | Resolved: 2024-04-30

## 2024-04-30 PROBLEM — E72.11 HOMOCYSTINURIA (CMS/HCC): Status: RESOLVED | Noted: 2020-12-21 | Resolved: 2024-04-30

## 2024-04-30 PROCEDURE — G0439 PPPS, SUBSEQ VISIT: HCPCS | Performed by: SURGERY

## 2024-04-30 RX ORDER — MONTELUKAST SODIUM 10 MG/1
TABLET ORAL
Qty: 90 TABLET | Refills: 3 | Status: SHIPPED | OUTPATIENT
Start: 2024-04-30

## 2024-04-30 RX ORDER — FAMOTIDINE 20 MG/1
TABLET, FILM COATED ORAL
Qty: 90 TABLET | Refills: 3 | Status: SHIPPED | OUTPATIENT
Start: 2024-04-30 | End: 2024-12-05

## 2024-04-30 RX ORDER — LEVOTHYROXINE SODIUM 100 UG/1
TABLET ORAL
Qty: 90 TABLET | Refills: 3 | Status: SHIPPED | OUTPATIENT
Start: 2024-04-30

## 2024-04-30 ASSESSMENT — PATIENT HEALTH QUESTIONNAIRE - PHQ9: SUM OF ALL RESPONSES TO PHQ9 QUESTIONS 1 & 2: 0

## 2024-04-30 ASSESSMENT — MINI COG: TOTAL SCORE: 4

## 2024-04-30 NOTE — ASSESSMENT & PLAN NOTE
Done Verapamil  mg daily, and has PRN Verapamil 40 mg.  No recurrences over the past year.  Continue care with Cardiology, Dr. Muse.

## 2024-04-30 NOTE — PROGRESS NOTES
MEDICARE ANNUAL WELLNESS VISIT    Kylee Hair MD  [facility] in [address]  [place] [address]  [phone]      HISTORY OF PRESENT ILLNESS        Patient is an 73 y.o. female who presents for a Medicare Wellness Exam.     She was new to my care this past November. Switched care from my partner, Dr. West.  She is here for a Medicare Wellness visit.    Interim updates:  -Saw her Lipidologist, Dr. Saucedo in January. No changes made to her cholesterol-lowering regimen. She takes rosuvastatin 4 days weekly and the zetia 3 days weekly. Her LDL was at goal, <100. She endorses muscle weakness on this regimen. Did physical therapy without significant improvement.     -Saw her Rheumatologist. Trial of Meloxicam for osteoarthritis. Osteopenia treatment not started. She would like to see Endocrinologist.     -Saw Urology, Dr. Kel Malcolm, in December for stress incontinence. Opted to consider pessary with a separate Gyn.    -Updated TSH in December normal range.     -Trigger finger in left hand 3rd and 4th fingers.4th finger continues to be affected. She has seen Dr. Celestino Zavala for this.    -Magnesium helps with leg pain at night.     Other Providers caring for patient:   -Dr. Muse (Cardiology)-h/o SVT., mild-mod aortic stenosis (echo 6/28/23)  -Dr. Saucedo (Lipidology)-  familial HLD, coronary ca 2019 of zero, Rx rosuva 5 mg, zetia 10  -Dr. Faith (Rheumatology)  -Dr. Dot Orozco  -Dr. Cait Harden (Gyn)  -Dr. Lai Wolfe (Sports)  -[facility] - Zane Diaz     Home safety screen:   Does your home have throw rugs, poor lighting, or a slippery bathtub/shower? no   Does your home LACK grab bars in bathrooms, handrails on stairs and steps?no   Does your home LACK functioning smoke alarms? no   Does your home LACK functioning carbon monoxide detectors? no     Hearing loss screen:   Do you have trouble hearing the television or radio when  others do not? no   Do you have to strain or struggle to hear/understand conversations? no     PAST MEDICAL AND SURGICAL HISTORY        Past Medical History:   Diagnosis Date    Anxiety     Asthma     Coronary artery disease     Hyperlipidemia     Hypothyroidism        Past Surgical History:   Procedure Laterality Date    ADENOIDECTOMY      CARDIAC CATHETERIZATION  1976    TONSILLECTOMY       MEDICATIONS          Current Outpatient Medications:     albuterol HFA (VENTOLIN HFA) 90 mcg/actuation inhaler, INHALE TWO (2) PUFFS BY MOUTH EVERY SIX (6) HOURS, Disp: 1 each, Rfl: 3    cholecalciferol, vitamin D3, (VITAMIN D3) 2,000 unit tablet, Take 2,000 Units by mouth daily., Disp: , Rfl:     coenzyme Q10 (CO Q-10) 10 mg capsule, Take 10 mg by mouth daily., Disp: , Rfl:     DOCOSAHEXANOIC ACID/EPA (FISH OIL ORAL), Take 3,000 mg by mouth daily., Disp: , Rfl:     ezetimibe (ZETIA) 10 mg tablet, TAKE ONE (1) TABLET (10 MG TOTAL) BY MOUTH THREE (3) (THREE) TIMES A WEEK (TUE, THU, SAT)., Disp: 39 tablet, Rfl: 3    famotidine (PEPCID) 20 mg tablet, TAKE ONE TABLET BY MOUTH ONCE DAILY, Disp: 90 tablet, Rfl: 3    levothyroxine (SYNTHROID) 100 mcg tablet, TAKE ONE TABLET BY MOUTH ONCE DAILY, Disp: 90 tablet, Rfl: 3    montelukast (SINGULAIR) 10 mg tablet, TAKE ONE (1) TABLET (10 MG TOTAL) BY MOUTH NIGHTLY., Disp: 90 tablet, Rfl: 3    niacin (ENDUR-ACIN) 500 mg CR tablet, Take 2 tablets (1,000 mg total) by mouth nightly., Disp: 180 tablet, Rfl: 3    rosuvastatin (CRESTOR) 5 mg tablet, Take 1 tablet (5 mg total) by mouth 4 (four) times a week (Mon, Wed, Fri, Sat)., Disp: 52 tablet, Rfl: 3    verapamiL (CALAN) 40 mg tablet, TAKE ONE TABLET BY MOUTH ONCE DAILY WHEN NEEDED (Patient taking differently: Take 40 mg by mouth daily. TAKE ONE TABLET BY MOUTH ONCE DAILY WHEN NEEDED), Disp: 90 tablet, Rfl: 3    verapamil SR (CALAN-SR) 240 mg CR tablet, TAKE ONE (1) CAPSULE (240 MG TOTAL) BY MOUTH NIGHTLY., Disp: 90 tablet, Rfl: 3    cetirizine  "(ZyrTEC) 10 mg tablet, Take 1 tablet (10 mg total) by mouth nightly. (Patient taking differently: Take 10 mg by mouth nightly as needed.), Disp: 90 tablet, Rfl: 1  ALLERGIES        Epinephrine and Procaine  FAMILY HISTORY        Family History   Problem Relation Age of Onset    Heart attack Biological Mother     Stroke Biological Mother     Heart attack Biological Father      SOCIAL/ TOBACCO HISTORY        Social History     Tobacco Use    Smoking status: Former    Smokeless tobacco: Never    Tobacco comments:     stopped @ age 31   Substance Use Topics    Alcohol use: Yes     Comment: daily    Drug use: No     REVIEW OF SYSTEMS        Review of Systems   Constitutional:  Negative for activity change, appetite change, fatigue, fever and unexpected weight change.   HENT:  Negative for dental problem, ear discharge, ear pain, hearing loss, tinnitus, trouble swallowing and voice change.    Eyes:  Negative for visual disturbance.   Respiratory:  Negative for apnea, cough, chest tightness, shortness of breath and wheezing.    Cardiovascular:  Negative for chest pain, palpitations and leg swelling.   Gastrointestinal:  Negative for abdominal distention, abdominal pain, blood in stool, constipation, diarrhea, nausea and vomiting.   Endocrine: Negative.    Genitourinary:  Negative for difficulty urinating and hematuria.        Stress incontinence   Musculoskeletal:  Negative for arthralgias, back pain, joint swelling and myalgias.   Skin:  Negative for rash and wound.   Allergic/Immunologic: Negative for immunocompromised state.   Neurological:  Negative for dizziness, weakness, light-headedness, numbness and headaches.   Psychiatric/Behavioral:  Negative for dysphoric mood and sleep disturbance. The patient is not nervous/anxious.       PHYSICAL EXAMINATION      Visit Vitals  /70 (BP Location: Left upper arm, Patient Position: Sitting)   Pulse 67   Temp 36.6 °C (97.9 °F) (Temporal)   Resp 12   Ht 1.575 m (5' 2\")   Wt " 67.5 kg (148 lb 12.8 oz)   SpO2 97%   BMI 27.22 kg/m²      Body mass index is 27.22 kg/m².    Hearing and Vision Screening:  No results found.    Mini Cog  Score: 4  Result: Negative    Get Up and Go       Physical Exam:  Physical Exam  Vitals and nursing note reviewed.   Constitutional:       General: She is not in acute distress.     Appearance: Normal appearance.   HENT:      Head: Normocephalic and atraumatic.      Right Ear: Tympanic membrane, ear canal and external ear normal.      Left Ear: Tympanic membrane, ear canal and external ear normal.      Nose: Nose normal.      Mouth/Throat:      Mouth: Mucous membranes are moist.      Pharynx: Oropharynx is clear. No oropharyngeal exudate or posterior oropharyngeal erythema.   Eyes:      General: No scleral icterus.     Extraocular Movements: Extraocular movements intact.      Conjunctiva/sclera: Conjunctivae normal.      Pupils: Pupils are equal, round, and reactive to light.   Neck:      Thyroid: No thyroid mass, thyromegaly or thyroid tenderness.      Trachea: Trachea and phonation normal.   Cardiovascular:      Rate and Rhythm: Normal rate and regular rhythm.      Pulses: Normal pulses.      Heart sounds: Normal heart sounds. No murmur heard.  Pulmonary:      Effort: Pulmonary effort is normal. No respiratory distress.      Breath sounds: Normal breath sounds. No wheezing, rhonchi or rales.   Abdominal:      General: Abdomen is flat. There is no distension.      Palpations: Abdomen is soft. There is no mass.      Tenderness: There is no abdominal tenderness. There is no right CVA tenderness, left CVA tenderness, guarding or rebound.   Musculoskeletal:         General: No swelling or tenderness. Normal range of motion.      Cervical back: Normal range of motion and neck supple. No tenderness.      Right lower leg: No edema.      Left lower leg: No edema.   Lymphadenopathy:      Cervical: No cervical adenopathy.   Skin:     General: Skin is warm.      Capillary  "Refill: Capillary refill takes less than 2 seconds.      Findings: No lesion or rash.   Neurological:      General: No focal deficit present.      Mental Status: She is alert and oriented to person, place, and time. Mental status is at baseline.      Cranial Nerves: No cranial nerve deficit.      Sensory: No sensory deficit.      Coordination: Coordination normal.      Gait: Gait normal.      Deep Tendon Reflexes: Reflexes normal.   Psychiatric:         Mood and Affect: Mood normal.         Behavior: Behavior normal.         Thought Content: Thought content normal.         Judgment: Judgment normal.         PRIOR LABS        No results found for: \"HGBA1C\"  Lab Results   Component Value Date    CHOL 164 12/28/2023    CHOL 235 (H) 06/27/2023    CHOL 185 05/27/2022     Lab Results   Component Value Date    HDL 69 12/28/2023     06/27/2023    HDL 79 05/27/2022     Lab Results   Component Value Date    LDLCALC 85 12/28/2023    LDLCALC 119 (H) 06/27/2023    LDLCALC 98 05/27/2022     Lab Results   Component Value Date    TRIG 48 12/28/2023    TRIG 69 06/27/2023    TRIG 41 05/27/2022     No results found for: \"CHOLHDL\"  Lab Results   Component Value Date    TSH 1.170 12/28/2023     Lab Results   Component Value Date    WBC 3.96 05/27/2022    HGB 13.9 05/27/2022    HCT 41.7 05/27/2022    MCV 93.3 05/27/2022     05/27/2022     Lab Results   Component Value Date     12/28/2023    K 4.3 12/28/2023     12/28/2023    CO2 22 12/28/2023    BUN 20 12/28/2023    CREATININE 0.89 12/28/2023    GLUCOSE 94 12/28/2023    AST 28 12/28/2023    ALT 35 (H) 12/28/2023    ALKPHOS 70 12/28/2023    PROTEIN 6.1 12/28/2023    ALBUMIN 4.0 12/28/2023    BILITOT 0.3 12/28/2023    EGFR 68 12/28/2023    ANIONGAP 7 06/27/2023       ASSESSMENT AND PLAN   Assessment   Problem List Items Addressed This Visit          Respiratory    Asthma    Relevant Medications    montelukast (SINGULAIR) 10 mg tablet       Circulatory    " Supraventricular tachycardia (CMS/HCC)     Verapamil  mg daily, and has PRN Verapamil 40 mg.  No recurrences over the past year.  Continue care with Cardiology, Dr. Muse.         Murmur     Aortic stenosis murmur.   Last echo 6/28/23 - mild to moderate.  Asymptomatic.  Follows regularly with Cardiology.         Aortic stenosis       Mild to moderate on Echocardiogram 6/28/23. Asymptomatic. Continue care with Cardiologists.          Atherosclerosis of aorta (CMS/HCC)     Continue statin.            Digestive    Esophageal reflux    Relevant Medications    famotidine (PEPCID) 20 mg tablet       Endocrine/Metabolic    Hypothyroidism     TSH stable on last labs. Continue levothyroxine 100 mcg daily.         Relevant Medications    levothyroxine (SYNTHROID) 100 mcg tablet       Other    Elevated Lp(a)     She will continue Endur-Acin for her elevated LP(a) under the direction of her Lipidologist, Dr. Saucedo.         Dyslipidemia     She will continue her regimen of Crestor 5 mg 4 days weekly and Zetia 10 mg daily. Under the care of Dr. Saucedo.         Medicare annual wellness visit, subsequent - Primary     Preventive Services:   Covid-19: new vaccine advised  Td: Due, last 2012. Advised she have at her pharmacy.  Pneumovax: Complete  PCV13: Complete  Shingrix: Reports both as complete.    Health Maintenance  DEXA scan: Osteopenia with lowest T score -2.4, left hip. Sees Rheumatology.  Colonoscopy: 1/2017 - normal, next in 2027  Mammogram: Normal per patient with Dr. Orozco 9/2023.    Anticipatory Guidance   Sunscreen/ABCDs Advised  Diet/Exercise  Advised  Recommend routing opth & dental care Advised                 Kylee Hair MD  6/5/2024

## 2024-04-30 NOTE — ASSESSMENT & PLAN NOTE
She will continue her regimen of Crestor 5 mg 4 days weekly and Zetia 10 mg daily. Under the care of Dr. Saucedo.

## 2024-04-30 NOTE — ASSESSMENT & PLAN NOTE
Preventive Services:   Covid-19: new vaccine advised  Td: Due, last 2012. Advised she have at her pharmacy.  Pneumovax: Complete  PCV13: Complete  Shingrix: Reports both as complete.    Health Maintenance  DEXA scan: Osteopenia with lowest T score -2.4, left hip. Sees Rheumatology.  Colonoscopy: 1/2017 - normal, next in 2027  Mammogram: Normal per patient with Dr. Orozco 9/2023.    Anticipatory Guidance   Sunscreen/ABCDs Advised  Diet/Exercise  Advised  Recommend routing opth & dental care Advised

## 2024-04-30 NOTE — ASSESSMENT & PLAN NOTE
She will continue Endur-Acin for her elevated LP(a) under the direction of her Lipidologist, Dr. Saucedo.

## 2024-05-09 ENCOUNTER — OFFICE VISIT (OUTPATIENT)
Dept: CARDIOLOGY | Facility: CLINIC | Age: 74
End: 2024-05-09
Payer: MEDICARE

## 2024-05-09 VITALS
WEIGHT: 147 LBS | SYSTOLIC BLOOD PRESSURE: 124 MMHG | DIASTOLIC BLOOD PRESSURE: 72 MMHG | HEIGHT: 62 IN | HEART RATE: 63 BPM | BODY MASS INDEX: 27.05 KG/M2

## 2024-05-09 DIAGNOSIS — R00.2 PALPITATION: ICD-10-CM

## 2024-05-09 DIAGNOSIS — E78.5 DYSLIPIDEMIA: ICD-10-CM

## 2024-05-09 DIAGNOSIS — I35.0 NONRHEUMATIC AORTIC VALVE STENOSIS: ICD-10-CM

## 2024-05-09 DIAGNOSIS — I47.10 SUPRAVENTRICULAR TACHYCARDIA (CMS/HCC): Primary | ICD-10-CM

## 2024-05-09 PROCEDURE — 99214 OFFICE O/P EST MOD 30 MIN: CPT | Performed by: INTERNAL MEDICINE

## 2024-05-09 PROCEDURE — 93000 ELECTROCARDIOGRAM COMPLETE: CPT | Performed by: INTERNAL MEDICINE

## 2024-05-09 NOTE — ASSESSMENT & PLAN NOTE
Asymptomatic.  Her echocardiogram obtained on June 28, 2023 revealed mild to moderate aortic stenosis. She is asymptomatic. I ordered her an echocardiogram to monitor progression of aortic stenosis.  She will continue to follow-up with Dr. Saucedo.

## 2024-05-09 NOTE — PROGRESS NOTES
" Ofelia Muse MD, [facility]  Cardiac Electrophysiology    [facility]    [facility]  [facility] [address]  [address] [place] [address]    TEL  [phone]  [facility]     Electrophysiology Office Visit  May 09 2024    Kelly Meeks is a 73 y.o. female who presents to the office today with a history of supraventricular tachycardia. The patient also has a past medical history of dyslipidemia and elevated LP(a). The patient had an ultrasound of the carotid bilateral on 12/31/2019, which revealed mild plaque. In addition, completed a CT coronary calcium test on 12/20/2019, which revealed a score of 0.      I saw her for follow up 9/28/23,  sooner than her scheduled appointment , due to new onset of heart palpitations. She describes her symptoms as \"flutters\" that recently started 1 week ago. The fluttering lasts seconds, but comes and goes throughout the day.  The symptoms are different than her symptoms when she has supraventricular tachycardia.  She has no recent changes in her diet. She takes a lot of supplements, but none of them are new. She has been under a lot of stress recently related to work and and in her personal life. She denies other cardiac symptoms such as chest pain, worsening shortness of breath, dizziness, or syncope. Her symptoms are different from her typical SVT. She denies any recurrences of her SVT over the past year. No new medical problems.  Patient continues to follow with Dr. Saucedo for dyslipidemia and was recently seen on 06/28/2023. She also had an echocardiogram done in June.     Since then patient underwent for a Zio-patch monitor on 10/01/24 which revealed that she had one 3.8 sec of SVT and other triggered events were sinus rhythm with single VPDs which are all benign. I called the patient and discussed increasing her dose of verapamil at night for palpitations in the evening.     Today, patient reports that " she is stable from a cardiovascular standpoint. She has no cardiac complaints at this time. Patient reported that her symptoms of heart palpitation has been resolved and she has not had any recent episodes. She continue to take her regular dose of verapamil 240-mg  and not taking any extra dose in morning. Patient is concerns as Dr. Saucedo told her that since she has a calcification in her carotids and thoracic aorta she maybe at risk developing  plaque in future. She states that her statin hasn't been change therefore, she wonders that if her dose of statin needs to be adjusted. I told her that she has to discuss this with Dr. Saucedo in her upcoming appointment. Patient is concerned as she wants to do everything to prevent progression atherosclerosis.    Patient Active Problem List   Diagnosis    Asthma    Supraventricular tachycardia (CMS/HCC)    Hypothyroidism    Murmur    Esophageal reflux    Elevated Lp(a)    Arteriosclerosis of both carotid arteries    Raynaud's disease without gangrene    Dyslipidemia    Palpitation    Aortic stenosis    Atherosclerosis of aorta (CMS/HCC)    Medicare annual wellness visit, subsequent     Past Medical History:   Diagnosis Date    Anxiety     Asthma     Coronary artery disease     Hyperlipidemia     Hypothyroidism      Past Surgical History:   Procedure Laterality Date    ADENOIDECTOMY      CARDIAC CATHETERIZATION  1976    TONSILLECTOMY       Allergies: Epinephrine and Procaine    Current Outpatient Medications   Medication Sig Dispense Refill    albuterol HFA (VENTOLIN HFA) 90 mcg/actuation inhaler INHALE TWO (2) PUFFS BY MOUTH EVERY SIX (6) HOURS 1 each 3    cetirizine (ZyrTEC) 10 mg tablet Take 1 tablet (10 mg total) by mouth nightly. (Patient taking differently: Take 10 mg by mouth nightly as needed.) 90 tablet 1    cholecalciferol, vitamin D3, (VITAMIN D3) 2,000 unit tablet Take 2,000 Units by mouth daily.      coenzyme Q10 (CO Q-10) 10 mg capsule Take 10 mg by  "mouth daily.      DOCOSAHEXANOIC ACID/EPA (FISH OIL ORAL) Take 3,000 mg by mouth daily.      ezetimibe (ZETIA) 10 mg tablet TAKE ONE (1) TABLET (10 MG TOTAL) BY MOUTH THREE (3) (THREE) TIMES A WEEK (TUE, THU, SAT). 39 tablet 3    famotidine (PEPCID) 20 mg tablet TAKE ONE TABLET BY MOUTH ONCE DAILY 90 tablet 3    levothyroxine (SYNTHROID) 100 mcg tablet TAKE ONE TABLET BY MOUTH ONCE DAILY 90 tablet 3    montelukast (SINGULAIR) 10 mg tablet TAKE ONE (1) TABLET (10 MG TOTAL) BY MOUTH NIGHTLY. 90 tablet 3    niacin (ENDUR-ACIN) 500 mg CR tablet Take 2 tablets (1,000 mg total) by mouth nightly. 180 tablet 3    rosuvastatin (CRESTOR) 5 mg tablet Take 1 tablet (5 mg total) by mouth 4 (four) times a week (Mon, Wed, Fri, Sat). 52 tablet 3    verapamiL (CALAN) 40 mg tablet TAKE ONE TABLET BY MOUTH ONCE DAILY WHEN NEEDED (Patient taking differently: Take 40 mg by mouth daily. TAKE ONE TABLET BY MOUTH ONCE DAILY WHEN NEEDED) 90 tablet 3    verapamil SR (CALAN-SR) 240 mg CR tablet TAKE ONE (1) CAPSULE (240 MG TOTAL) BY MOUTH NIGHTLY. 90 tablet 3     No current facility-administered medications for this visit.      Objective     Vitals:    05/09/24 1449   BP: 124/72   BP Location: Left upper arm   Patient Position: Sitting   Pulse: 63   Weight: 66.7 kg (147 lb)   Height: 1.575 m (5' 2\")     Physical Exam : Pleasant, in no acute distress.  Head:                    Atraumatic. Normocephalic  Eyes:                     No scleral icterus. Normal EOMs  Neck:                     No thyromegaly present. Normal ROM  Vascular:              No JVD. No carotid bruits  Cardiovascular:   Normal S1/S2, Regular rhythm, I/VI systolic murmur.  Pulmonary:           Breath sounds normal. No  Rales, wheezes or rhonchi bilateral.  Abdominal:           Soft. There is no tenderness. Bowel sounds present. No masses.  Musculoskeletal: There are no deformities.   Extremities:          No edema or cyanosis.  Neurological:        Alert, oriented x3. No " focal deficits to gross exam.  Skin:                      No rash noted.   Psychiatric:           Normal mood and affect       ECG 05/09/2024: I personally reviewed her 12-lead EKG performed today which reveals Sinus rhythm. Normal EKG.      Lab Results   Component Value Date    HGB 13.9 05/27/2022     05/27/2022    WBC 3.96 05/27/2022    ALT 35 (H) 12/28/2023    AST 28 12/28/2023     12/28/2023    BUN 20 12/28/2023    CREATININE 0.89 12/28/2023    K 4.3 12/28/2023    GLUCOSE 94 12/28/2023    TSH 1.170 12/28/2023    CHOL 164 12/28/2023    HDL 69 12/28/2023    TRIG 48 12/28/2023    LDLCALC 85 12/28/2023       CT CALCIUM SCORE: 06/03/2019  CORONARY CALCIUM COMPOSITE AGATSTON SCORE: 0    Cardiac Imaging    TRANSTHORACIC ECHO (TTE) COMPLETE 06/28/2023    Interpretation Summary    Left Ventricle: Normal ventricle size. Normal wall thickness. Estimated EF 65-70%. No regional wall motion abnormalities. Grade I diastolic dysfunction.    Aortic Valve: Tricuspid valve.  Calcification present with reduced excursion. Trace regurgitation. Mild to moderate stenosis. The peak aortic velocity was measured in the apical view. Peak velocity = 2.79 m/s. Mean gradient = 17.00 mmHg. Calculated area by cont eq = 1.43 cm2. Calculated dimensionless index = 0.50.    Aorta: Aortic root normal. Sinuses of Valsalva normal-sized. Ascending aorta normal-sized.    Mitral Valve: Normal leaflet structure. Mild regurgitation.    Left Atrium: Mildly dilated atrium.    Right Atrium: Mildly dilated atrium.    Tricuspid Valve: Normal structure. Mild regurgitation. Estimated RVSP = 31 mmHg.    Right Ventricle: Normal ventricle size. Normal systolic function.    Pulmonic Valve: Normal structure. Trace regurgitation.    IVC/SVC: Inferior vena cava is dilated (>2.1cm). Inferior vena cava demonstrates normal respiratory collapse.    Pericardium: Normal structure.    Zio-patch Monitor from 10/01 to  10/06/24:        ASSESSMENT/PLAN:  Palpitation  Patient reported that her symptoms of heart palpitation has been resolved and she has not had any recent episodes.   The Zio patch monitor revealed a 9 beat run of SVT and occasional VPD's and APDs.  She will continue so take her regular dose of verapamil 240-mg SR which she has been taking for a history of sustained SVT.     Supraventricular tachycardia (CMS/HCC) (HCC)  ...  No recurrences of sustained SVT over the several years.  She will continue to take Verapamil SR 240mg daily.  Patient  previously elected not to undergo catheter ablation procedure, but medical therapy.       Aortic stenosis  Asymptomatic.  Her echocardiogram obtained on June 28, 2023 revealed mild to moderate aortic stenosis. She is asymptomatic. I ordered her an echocardiogram to monitor progression of aortic stenosis.  She will continue to follow-up with Dr. Saucedo.    Dyslipidemia  She will continue current medical therapy and follow-up with Dr. Saucedo.  She has a family history of premature atherosclerotic coronary artery disease.  The CT heart calcium score was 0 in 2019.  I advised to discuss management of atherosclerosis in aorta with him.      Return in about 6 months (around 11/9/2024).    I will contact a patient with the results of an echocardiogram upon completion.     Thank you for allowing me to participate in the care of your patient.  Please do not hesitate to contact me if you have any questions regarding my recommendations and management.     Sincerely;    Ofelia Guerra M.D., FACC , FACP    This document was generated utilizing voice recognition technology. A reasonable attempt at proofreading has been made to minimize errors but please excuse any typographical errors which may be present. Please call with any questions.    By signing my name below, I, Paola Zapata, attest that this documentation has been prepared under the direction and in the presence of Ofelia  MD Micha Electronically signed: Khadar Crawford. 5/9/2024 3:15 PM     I, Ofelia Muse MD, personally performed the services described in this documentation. All medical record entries made by the scribe were at my direction and in my presence. I have reviewed the chart and agree that the record reflects my personal performance and is accurate and complete. Ofelia Muse MD. 5/9/2024. 3:15 PM.

## 2024-05-09 NOTE — ASSESSMENT & PLAN NOTE
Patient reported that her symptoms of heart palpitation has been resolved and she has not had any recent episodes.   The Zio patch monitor revealed a 9 beat run of SVT and occasional VPD's and APDs.  She will continue so take her regular dose of verapamil 240-mg SR which she has been taking for a history of sustained SVT.

## 2024-05-09 NOTE — ASSESSMENT & PLAN NOTE
...  No recurrences of sustained SVT over the several years.  She will continue to take Verapamil SR 240mg daily.  Patient  previously elected not to undergo catheter ablation procedure, but medical therapy.

## 2024-05-09 NOTE — ASSESSMENT & PLAN NOTE
...She will continue current medical therapy and follow-up with Dr. Saucedo.  She has a family history of premature atherosclerotic coronary artery disease.  The CT heart calcium score was 0 in 2019.

## 2024-05-09 NOTE — LETTER
"    Kylee Hair MD  [address]  [facility] [address]  [facility] [address]    Patient: Kelly Meeks  YOB: 1950  Date of Visit: 5/9/2024      Dear Dr. Deacon Hair:    Thank you for referring Kelly Meeks to me for evaluation. Below are my notes for this consultation.    If you have questions, please do not hesitate to call me. I look forward to following your patient along with you.         Sincerely,        Ofelia Muse MD        CC: MD Micha Cross Maribel, MD  5/26/2024  4:35 PM  Sign when Signing Visit   Ofelia Muse MD, [facility]  Cardiac Electrophysiology    [facility] HEART [facility]  [facility]  [address] [place] [address]    TEL  [phone]  [facility]     Electrophysiology Office Visit  May 09 2024    Kelly Meeks is a 73 y.o. female who presents to the office today with a history of supraventricular tachycardia. The patient also has a past medical history of dyslipidemia and elevated LP(a). The patient had an ultrasound of the carotid bilateral on 12/31/2019, which revealed mild plaque. In addition, completed a CT coronary calcium test on 12/20/2019, which revealed a score of 0.      I saw her for follow up 9/28/23,  sooner than her scheduled appointment , due to new onset of heart palpitations. She describes her symptoms as \"flutters\" that recently started 1 week ago. The fluttering lasts seconds, but comes and goes throughout the day.  The symptoms are different than her symptoms when she has supraventricular tachycardia.  She has no recent changes in her diet. She takes a lot of supplements, but none of them are new. She has been under a lot of stress recently related to work and and in her personal life. She denies other cardiac symptoms such as chest pain, worsening shortness of breath, dizziness, or syncope. Her symptoms are different from her typical SVT. She " denies any recurrences of her SVT over the past year. No new medical problems.  Patient continues to follow with Dr. Saucedo for dyslipidemia and was recently seen on 06/28/2023. She also had an echocardiogram done in June.     Since then patient underwent for a Zio-patch monitor on 10/01/24 which revealed that she had one 3.8 sec of SVT and other triggered events were sinus rhythm with single VPDs which are all benign. I called the patient and discussed increasing her dose of verapamil at night for palpitations in the evening.     Today, patient reports that she is stable from a cardiovascular standpoint. She has no cardiac complaints at this time. Patient reported that her symptoms of heart palpitation has been resolved and she has not had any recent episodes. She continue to take her regular dose of verapamil 240-mg  and not taking any extra dose in morning. Patient is concerns as Dr. Saucedo told her that since she has a calcification in her carotids and thoracic aorta she maybe at risk developing  plaque in future. She states that her statin hasn't been change therefore, she wonders that if her dose of statin needs to be adjusted. I told her that she has to discuss this with Dr. Saucedo in her upcoming appointment. Patient is concerned as she wants to do everything to prevent progression atherosclerosis.    Patient Active Problem List   Diagnosis   • Asthma   • Supraventricular tachycardia (CMS/HCC)   • Hypothyroidism   • Murmur   • Esophageal reflux   • Elevated Lp(a)   • Arteriosclerosis of both carotid arteries   • Raynaud's disease without gangrene   • Dyslipidemia   • Palpitation   • Aortic stenosis   • Atherosclerosis of aorta (CMS/HCC)   • Medicare annual wellness visit, subsequent     Past Medical History:   Diagnosis Date   • Anxiety    • Asthma    • Coronary artery disease    • Hyperlipidemia    • Hypothyroidism      Past Surgical History:   Procedure Laterality Date   • ADENOIDECTOMY     •  "CARDIAC CATHETERIZATION  1976   • TONSILLECTOMY       Allergies: Epinephrine and Procaine    Current Outpatient Medications   Medication Sig Dispense Refill   • albuterol HFA (VENTOLIN HFA) 90 mcg/actuation inhaler INHALE TWO (2) PUFFS BY MOUTH EVERY SIX (6) HOURS 1 each 3   • cetirizine (ZyrTEC) 10 mg tablet Take 1 tablet (10 mg total) by mouth nightly. (Patient taking differently: Take 10 mg by mouth nightly as needed.) 90 tablet 1   • cholecalciferol, vitamin D3, (VITAMIN D3) 2,000 unit tablet Take 2,000 Units by mouth daily.     • coenzyme Q10 (CO Q-10) 10 mg capsule Take 10 mg by mouth daily.     • DOCOSAHEXANOIC ACID/EPA (FISH OIL ORAL) Take 3,000 mg by mouth daily.     • ezetimibe (ZETIA) 10 mg tablet TAKE ONE (1) TABLET (10 MG TOTAL) BY MOUTH THREE (3) (THREE) TIMES A WEEK (TUE, THU, SAT). 39 tablet 3   • famotidine (PEPCID) 20 mg tablet TAKE ONE TABLET BY MOUTH ONCE DAILY 90 tablet 3   • levothyroxine (SYNTHROID) 100 mcg tablet TAKE ONE TABLET BY MOUTH ONCE DAILY 90 tablet 3   • montelukast (SINGULAIR) 10 mg tablet TAKE ONE (1) TABLET (10 MG TOTAL) BY MOUTH NIGHTLY. 90 tablet 3   • niacin (ENDUR-ACIN) 500 mg CR tablet Take 2 tablets (1,000 mg total) by mouth nightly. 180 tablet 3   • rosuvastatin (CRESTOR) 5 mg tablet Take 1 tablet (5 mg total) by mouth 4 (four) times a week (Mon, Wed, Fri, Sat). 52 tablet 3   • verapamiL (CALAN) 40 mg tablet TAKE ONE TABLET BY MOUTH ONCE DAILY WHEN NEEDED (Patient taking differently: Take 40 mg by mouth daily. TAKE ONE TABLET BY MOUTH ONCE DAILY WHEN NEEDED) 90 tablet 3   • verapamil SR (CALAN-SR) 240 mg CR tablet TAKE ONE (1) CAPSULE (240 MG TOTAL) BY MOUTH NIGHTLY. 90 tablet 3     No current facility-administered medications for this visit.      Objective    Vitals:    05/09/24 1449   BP: 124/72   BP Location: Left upper arm   Patient Position: Sitting   Pulse: 63   Weight: 66.7 kg (147 lb)   Height: 1.575 m (5' 2\")     Physical Exam : Pleasant, in no acute " distress.  Head:                    Atraumatic. Normocephalic  Eyes:                     No scleral icterus. Normal EOMs  Neck:                     No thyromegaly present. Normal ROM  Vascular:              No JVD. No carotid bruits  Cardiovascular:   Normal S1/S2, Regular rhythm, I/VI systolic murmur.  Pulmonary:           Breath sounds normal. No  Rales, wheezes or rhonchi bilateral.  Abdominal:           Soft. There is no tenderness. Bowel sounds present. No masses.  Musculoskeletal: There are no deformities.   Extremities:          No edema or cyanosis.  Neurological:        Alert, oriented x3. No focal deficits to gross exam.  Skin:                      No rash noted.   Psychiatric:           Normal mood and affect       ECG 05/09/2024: I personally reviewed her 12-lead EKG performed today which reveals Sinus rhythm. Normal EKG.      Lab Results   Component Value Date    HGB 13.9 05/27/2022     05/27/2022    WBC 3.96 05/27/2022    ALT 35 (H) 12/28/2023    AST 28 12/28/2023     12/28/2023    BUN 20 12/28/2023    CREATININE 0.89 12/28/2023    K 4.3 12/28/2023    GLUCOSE 94 12/28/2023    TSH 1.170 12/28/2023    CHOL 164 12/28/2023    HDL 69 12/28/2023    TRIG 48 12/28/2023    LDLCALC 85 12/28/2023       CT CALCIUM SCORE: 06/03/2019  CORONARY CALCIUM COMPOSITE AGATSTON SCORE: 0    Cardiac Imaging    TRANSTHORACIC ECHO (TTE) COMPLETE 06/28/2023    Interpretation Summary  •  Left Ventricle: Normal ventricle size. Normal wall thickness. Estimated EF 65-70%. No regional wall motion abnormalities. Grade I diastolic dysfunction.  •  Aortic Valve: Tricuspid valve.  Calcification present with reduced excursion. Trace regurgitation. Mild to moderate stenosis. The peak aortic velocity was measured in the apical view. Peak velocity = 2.79 m/s. Mean gradient = 17.00 mmHg. Calculated area by cont eq = 1.43 cm2. Calculated dimensionless index = 0.50.  •  Aorta: Aortic root normal. Sinuses of Valsalva normal-sized.  Ascending aorta normal-sized.  •  Mitral Valve: Normal leaflet structure. Mild regurgitation.  •  Left Atrium: Mildly dilated atrium.  •  Right Atrium: Mildly dilated atrium.  •  Tricuspid Valve: Normal structure. Mild regurgitation. Estimated RVSP = 31 mmHg.  •  Right Ventricle: Normal ventricle size. Normal systolic function.  •  Pulmonic Valve: Normal structure. Trace regurgitation.  •  IVC/SVC: Inferior vena cava is dilated (>2.1cm). Inferior vena cava demonstrates normal respiratory collapse.  •  Pericardium: Normal structure.    Zio-patch Monitor from 10/01 to 10/06/24:        ASSESSMENT/PLAN:  Palpitation  Patient reported that her symptoms of heart palpitation has been resolved and she has not had any recent episodes.   The Zio patch monitor revealed a 9 beat run of SVT and occasional VPD's and APDs.  She will continue so take her regular dose of verapamil 240-mg SR which she has been taking for a history of sustained SVT.     Supraventricular tachycardia (CMS/HCC) (HCC)  ...  No recurrences of sustained SVT over the several years.  She will continue to take Verapamil SR 240mg daily.  Patient  previously elected not to undergo catheter ablation procedure, but medical therapy.       Aortic stenosis  Asymptomatic.  Her echocardiogram obtained on June 28, 2023 revealed mild to moderate aortic stenosis. She is asymptomatic. I ordered her an echocardiogram to monitor progression of aortic stenosis.  She will continue to follow-up with Dr. Saucedo.    Dyslipidemia  She will continue current medical therapy and follow-up with Dr. Saucedo.  She has a family history of premature atherosclerotic coronary artery disease.  The CT heart calcium score was 0 in 2019.  I advised to discuss management of atherosclerosis in aorta with him.      Return in about 6 months (around 11/9/2024).    I will contact a patient with the results of an echocardiogram upon completion.     Thank you for allowing me to participate in  the care of your patient.  Please do not hesitate to contact me if you have any questions regarding my recommendations and management.     Sincerely;    Ofelia uGerra M.D., FACC , FACP    This document was generated utilizing voice recognition technology. A reasonable attempt at proofreading has been made to minimize errors but please excuse any typographical errors which may be present. Please call with any questions.    By signing my name below, I, Paola Zapata, attest that this documentation has been prepared under the direction and in the presence of Ofelia Muse MD Electronically signed: Khadar Crawford. 5/9/2024 3:15 PM     I, Ofelia Muse MD, personally performed the services described in this documentation. All medical record entries made by the scribe were at my direction and in my presence. I have reviewed the chart and agree that the record reflects my personal performance and is accurate and complete. Ofelia Muse MD. 5/9/2024. 3:15 PM.

## 2024-05-26 NOTE — ASSESSMENT & PLAN NOTE
She will continue current medical therapy and follow-up with Dr. Saucedo.  She has a family history of premature atherosclerotic coronary artery disease.  The CT heart calcium score was 0 in 2019.  I advised to discuss management of atherosclerosis in aorta with him.

## 2024-06-05 ASSESSMENT — ENCOUNTER SYMPTOMS
BLOOD IN STOOL: 0
NUMBNESS: 0
COUGH: 0
DIFFICULTY URINATING: 0
BACK PAIN: 0
WHEEZING: 0
ABDOMINAL PAIN: 0
SHORTNESS OF BREATH: 0
VOMITING: 0
FATIGUE: 0
APNEA: 0
NERVOUS/ANXIOUS: 0
TROUBLE SWALLOWING: 0
DYSPHORIC MOOD: 0
HEADACHES: 0
DIARRHEA: 0
FEVER: 0
VOICE CHANGE: 0
ARTHRALGIAS: 0
UNEXPECTED WEIGHT CHANGE: 0
PALPITATIONS: 0
NAUSEA: 0
CONSTIPATION: 0
JOINT SWELLING: 0
ACTIVITY CHANGE: 0
MYALGIAS: 0
HEMATURIA: 0
WOUND: 0
SLEEP DISTURBANCE: 0
LIGHT-HEADEDNESS: 0
ENDOCRINE NEGATIVE: 1
APPETITE CHANGE: 0
WEAKNESS: 0
CHEST TIGHTNESS: 0
ABDOMINAL DISTENTION: 0
DIZZINESS: 0

## 2024-06-07 NOTE — TELEPHONE ENCOUNTER
CHIEF COMPLAINT:    Chief Complaint   Patient presents with    Back Pain       SUBJECTIVE:    Ramiro Molina is a 52 year old male who presented with wife requesting evaluation for low back pain after fall yesterday.  Patient was walking his dog and tripped on concrete.  He fell onto his lower back.  He reports significant midline low back pain with leg weakness.  Denies any numbness or tingling in his legs.  No symptoms of saddle anesthesia or bladder/bowel dysfunction.  He has had back issues previously.  Denies history of back surgery.  He has had cortisone injections previously, but this was about 15 years ago.    Allergy/adverse drug reaction list includes several NSAIDs, Darvocet and tramadol.    REVIEW OF SYSTEMS:  All other systems are reviewed and are negative except as documented in the history of present illness.     Past Medical History:   Diagnosis Date    Abscess of anal and rectal regions 2013    S/p I&D    Acute left PCA stroke  (CMD) 2013    Arterial ischemic stroke, vertebrobasilar, brainstem, remote, resolved     TIA    Chronic obstructive asthma, unspecified 2013    Depression     Duodenal ulcer, unspecified as acute or chronic, without hemorrhage, perforation, or obstruction 2013    Headache(784.0)     Hepatitis C antibody test positive 2013    Lumbosacral radiculopathy 2013    RAD (reactive airway disease) (CMD)     S/P appendectomy 2013    S/P foot surgery 2013    Unspecified asthma(493.90) 2013    Unspecified gastritis and gastroduodenitis without mention of hemorrhage 2013     Past Surgical History:   Procedure Laterality Date    Appendectomy      Plantar fascia release      Tonsillectomy and adenoidectomy          Social History     Tobacco Use    Smoking status: Former     Current packs/day: 0.00     Types: Cigarettes     Quit date: 2012     Years since quittin.4    Smokeless tobacco: Former     Types: Chew  Please send to pharmacy on file. Tks!     Substance Use Topics    Alcohol use: No     Alcohol/week: 0.0 standard drinks of alcohol     Comment: occassionally liquir and beer h/o alcoholism       OBJECTIVE:  PHYSICAL EXAM:   Pulse Ox Interpretation:  Within normal limits.  General:   The patient is in moderate discomfort, sitting in a wheelchair.  Back:  Normal alignment.  Reports significant tenderness with palpation to lower lower back at midline, in addition to paraspinal musculature.    X-rays of lumbar spine are negative for acute osseous injury.  There are noted mild multilevel spondylosis and facet arthropathy noted.    ASSESSMENT AND PLAN:   1. Acute midline low back pain, unspecified whether sciatica present    Acute low back pain from fall yesterday.  He is prescribed Medrol Dosepak to be started tomorrow.  I am agreeable to giving 6 tablets of oxycodone/APAP, 1 tablet every 6 hours for back pain for symptomatic relief.  Recommended using ice over the next 48 hours, may switch to heat thereafter.  Symptoms should gradually improve over the next 5-7 days.    Is advised to go to ED if having symptoms of saddle anesthesia, urinary retention or bowel/bladder incontinence.  He will follow up in urgent Care as needed.       Orders Placed This Encounter    XR LUMBAR SPINE 2-3V    methylPREDNISolone (MEDROL DOSEPAK) 4 MG tablet    oxyCODONE-acetaminophen (PERCOCET) 5-325 MG per tablet       Instructions provided as documented in the after visit summary.  The patient indicated understanding of the diagnosis and agreed with the plan of care.     Please note that this chart was generated using voice recognition Fluency Direct dictation software.  Although every effort was made to ensure the accuracy of this transcription, some errors may have occurred.

## 2024-06-19 ENCOUNTER — TELEPHONE (OUTPATIENT)
Dept: PRIMARY CARE | Facility: CLINIC | Age: 74
End: 2024-06-19
Payer: MEDICARE

## 2024-06-19 RX ORDER — ALBUTEROL SULFATE 90 UG/1
2 INHALANT RESPIRATORY (INHALATION) 4 TIMES DAILY PRN
Qty: 30 EACH | Refills: 0 | Status: SHIPPED | OUTPATIENT
Start: 2024-06-19 | End: 2024-07-31

## 2024-06-19 RX ORDER — ALBUTEROL SULFATE 90 UG/1
2 INHALANT RESPIRATORY (INHALATION) 4 TIMES DAILY PRN
Qty: 30 EACH | Refills: 0 | Status: SHIPPED | OUTPATIENT
Start: 2024-06-19 | End: 2024-06-19 | Stop reason: SDUPTHER

## 2024-06-27 ENCOUNTER — HOSPITAL ENCOUNTER (OUTPATIENT)
Dept: CARDIOLOGY | Facility: HOSPITAL | Age: 74
Discharge: HOME | End: 2024-06-27
Attending: INTERNAL MEDICINE
Payer: MEDICARE

## 2024-06-27 VITALS
SYSTOLIC BLOOD PRESSURE: 124 MMHG | BODY MASS INDEX: 27.05 KG/M2 | DIASTOLIC BLOOD PRESSURE: 78 MMHG | HEIGHT: 62 IN | WEIGHT: 147 LBS | HEART RATE: 65 BPM

## 2024-06-27 DIAGNOSIS — I35.0 NONRHEUMATIC AORTIC VALVE STENOSIS: ICD-10-CM

## 2024-06-27 LAB
AORTIC ROOT ANNULUS: 2.7 CM
AORTIC VALVE MEAN VELOCITY: 1.79 M/S
AORTIC VALVE VELOCITY TIME INTEGRAL: 61.6 CM
ASCENDING AORTA: 2.9 CM
AV MEAN GRADIENT: 15 MMHG
AV PEAK GRADIENT: 30 MMHG
AV PEAK VELOCITY-S: 2.74 M/S
AV VALVE AREA INDEX: 0.74
AV VALVE AREA: 1.01 CM2
AV VELOCITY RATIO: 0.4
AVA (VTI): 1.26 CM2
BSA FOR ECHO PROCEDURE: 1.71 M2
DOP CALC LVOT STROKE VOLUME: 77.56 CM3
E WAVE DECELERATION TIME: 268 MS
E/A RATIO: 0.9
E/E' RATIO: 15.8
E/LAT E' RATIO: 14.7
EDV (BP): 73.6 CM3
EF (A4C): 58.4 %
EF A2C: 63.5 %
EJECTION FRACTION: 61.1 %
EST RIGHT VENT SYSTOLIC PRESSURE BY TRICUSPID REGURGITATION JET: 36 MMHG
ESV (BP): 28.6 CM3
FRACTIONAL SHORTENING: 40.47 %
INTERVENTRICULAR SEPTUM: 0.83 CM
LA ESV (BP): 42.5 CM3
LA ESV INDEX (A2C): 22.69 CM3/M2
LA ESV INDEX (BP): 24.85 CM3/M2
LA/AORTA RATIO: 1.48
LAAS-AP2: 15.5 CM2
LAAS-AP4: 16.8 CM2
LAD 2D: 4 CM
LALD A4C: 4.9 CM
LALD A4C: 5.08 CM
LAV-S: 38.8 CM3
LEFT ATRIUM VOLUME INDEX: 26.37 CM3/M2
LEFT ATRIUM VOLUME: 45.1 CM3
LEFT INTERNAL DIMENSION IN SYSTOLE: 2.56 CM (ref 2.36–3.57)
LEFT VENTRICLE DIASTOLIC VOLUME INDEX: 43.86 CM3/M2
LEFT VENTRICLE DIASTOLIC VOLUME: 75 CM3
LEFT VENTRICLE SYSTOLIC VOLUME INDEX: 18.25 CM3/M2
LEFT VENTRICLE SYSTOLIC VOLUME: 31.2 CM3
LEFT VENTRICULAR INTERNAL DIMENSION IN DIASTOLE: 4.3 CM (ref 3.98–5.52)
LEFT VENTRICULAR POSTERIOR WALL IN END DIASTOLE: 0.86 CM (ref 0.52–0.96)
LV DIASTOLIC VOLUME: 71.5 CM3
LV ESV (APICAL 2 CHAMBER): 26.1 CM3
LVAD-AP2: 25 CM2
LVAD-AP4: 25.6 CM2
LVAS-AP2: 13.7 CM2
LVAS-AP4: 15.1 CM2
LVEDVI(A2C): 41.81 CM3/M2
LVEDVI(BP): 43.04 CM3/M2
LVESVI(A2C): 15.26 CM3/M2
LVESVI(BP): 16.73 CM3/M2
LVLD-AP2: 7.07 CM
LVLD-AP4: 7.15 CM
LVLS-AP2: 6.14 CM
LVLS-AP4: 6.16 CM
LVOT 2D: 2 CM
LVOT A: 3.14 CM2
LVOT MG: 3 MMHG
LVOT MV: 0.79 M/S
LVOT PEAK VELOCITY: 1.12 M/S
LVOT PG: 5 MMHG
LVOT STROKE VOLUME INDEX: 45.36 ML/M2
LVOT VTI: 24.7 CM
MLH CV ECHO AVA INDEX VELOCITY RATIO: 0.6
MV E'TISSUE VEL-LAT: 0.06 M/S
MV E'TISSUE VEL-MED: 0.05 M/S
MV PEAK A VEL: 0.99 M/S
MV PEAK E VEL: 0.85 M/S
POSTERIOR WALL: 0.86 CM
RAP: 8 MMHG
RVOT VMAX: 0.8 M/S
RVOT VTI: 19.5 CM
SEPTAL TISSUE DOPPLER FREE WALL LATE DIA VELOCITY (APICAL 4 CHAMBER VIEW): 0.12 M/S
TR MAX PG: 27.67 MMHG
TRICUSPID VALVE PEAK REGURGITATION VELOCITY: 2.63 M/S
Z-SCORE OF LEFT VENTRICULAR DIMENSION IN END DIASTOLE: -0.86
Z-SCORE OF LEFT VENTRICULAR DIMENSION IN END SYSTOLE: -1
Z-SCORE OF LEFT VENTRICULAR POSTERIOR WALL IN END DIASTOLE: 1.03

## 2024-06-27 PROCEDURE — 93306 TTE W/DOPPLER COMPLETE: CPT

## 2024-06-27 PROCEDURE — 93306 TTE W/DOPPLER COMPLETE: CPT | Mod: 26 | Performed by: INTERNAL MEDICINE

## 2024-06-27 RX ORDER — EZETIMIBE 10 MG/1
TABLET ORAL
Qty: 36 TABLET | Refills: 3 | Status: SHIPPED | OUTPATIENT
Start: 2024-06-27 | End: 2024-11-21 | Stop reason: DRUGHIGH

## 2024-06-28 ENCOUNTER — TELEPHONE (OUTPATIENT)
Dept: CARDIOLOGY | Facility: CLINIC | Age: 74
End: 2024-06-28
Payer: MEDICARE

## 2024-06-28 NOTE — TELEPHONE ENCOUNTER
Please call the patient.  The echocardiogram shows normal left ventricular systolic function with mild to moderate arctic stenosis.  There is no change as compared to the echocardiogram last year.  I will contact continue to monitor her arctic valve.

## 2024-07-02 NOTE — TELEPHONE ENCOUNTER
Spoke to patient. I reviewed results of recent echo. I let her know her left ventricular systolic function is normal. There is mild to moderate aortic stenosis unchanged from previous study. She has further questions and would like to speak to Dr. Muse

## 2024-07-03 ENCOUNTER — TELEPHONE (OUTPATIENT)
Dept: CARDIOLOGY | Facility: CLINIC | Age: 74
End: 2024-07-03
Payer: MEDICARE

## 2024-07-03 NOTE — TELEPHONE ENCOUNTER
Patient of Dr. Saucedo. Patient wants to discuss medication change and would like to see only MD and not CRNP. Patient is willing to see Dr. Ebenezer Torres. Please call patient and schedule appt with him. TY

## 2024-07-31 RX ORDER — ALBUTEROL SULFATE 90 UG/1
INHALANT RESPIRATORY (INHALATION)
Qty: 8.5 EACH | Refills: 0 | Status: SHIPPED | OUTPATIENT
Start: 2024-07-31 | End: 2024-12-17

## 2024-07-31 NOTE — TELEPHONE ENCOUNTER
Medicine Refill Request    Last Office Visit: Visit date not found   Last Consult Visit: Visit date not found  Last Telemedicine Visit: Visit date not found    Next Appointment: Visit date not found      Current Outpatient Medications:     albuterol HFA 90 mcg/actuation inhaler, Inhale 2 puffs 4 (four) times a day as needed for wheezing., Disp: 30 each, Rfl: 0    cetirizine (ZyrTEC) 10 mg tablet, Take 1 tablet (10 mg total) by mouth nightly. (Patient taking differently: Take 10 mg by mouth nightly as needed.), Disp: 90 tablet, Rfl: 1    cholecalciferol, vitamin D3, (VITAMIN D3) 2,000 unit tablet, Take 2,000 Units by mouth daily., Disp: , Rfl:     coenzyme Q10 (CO Q-10) 10 mg capsule, Take 10 mg by mouth daily., Disp: , Rfl:     DOCOSAHEXANOIC ACID/EPA (FISH OIL ORAL), Take 3,000 mg by mouth daily., Disp: , Rfl:     ezetimibe (ZETIA) 10 mg tablet, TAKE ONE (1) TABLET (10 MG TOTAL) BY MOUTH THREE (3) (THREE) TIMES A WEEK (TUE, THU, SAT)., Disp: 36 tablet, Rfl: 3    famotidine (PEPCID) 20 mg tablet, TAKE ONE TABLET BY MOUTH ONCE DAILY, Disp: 90 tablet, Rfl: 3    levothyroxine (SYNTHROID) 100 mcg tablet, TAKE ONE TABLET BY MOUTH ONCE DAILY, Disp: 90 tablet, Rfl: 3    montelukast (SINGULAIR) 10 mg tablet, TAKE ONE (1) TABLET (10 MG TOTAL) BY MOUTH NIGHTLY., Disp: 90 tablet, Rfl: 3    niacin (ENDUR-ACIN) 500 mg CR tablet, Take 2 tablets (1,000 mg total) by mouth nightly., Disp: 180 tablet, Rfl: 3    rosuvastatin (CRESTOR) 5 mg tablet, Take 1 tablet (5 mg total) by mouth 4 (four) times a week (Mon, Wed, Fri, Sat)., Disp: 52 tablet, Rfl: 3    verapamiL (CALAN) 40 mg tablet, TAKE ONE TABLET BY MOUTH ONCE DAILY WHEN NEEDED (Patient taking differently: Take 40 mg by mouth daily. TAKE ONE TABLET BY MOUTH ONCE DAILY WHEN NEEDED), Disp: 90 tablet, Rfl: 3    verapamil SR (CALAN-SR) 240 mg CR tablet, TAKE ONE (1) CAPSULE (240 MG TOTAL) BY MOUTH NIGHTLY., Disp: 90 tablet, Rfl: 3      BP Readings from Last 3 Encounters:   06/27/24  "124/78   05/09/24 124/72   04/30/24 118/70       Recent Lab results:  Lab Results   Component Value Date    CHOL 164 12/28/2023   ,   Lab Results   Component Value Date    HDL 69 12/28/2023   ,   Lab Results   Component Value Date    LDLCALC 85 12/28/2023   ,   Lab Results   Component Value Date    TRIG 48 12/28/2023        Lab Results   Component Value Date    GLUCOSE 94 12/28/2023   , No results found for: \"HGBA1C\"      Lab Results   Component Value Date    CREATININE 0.89 12/28/2023       Lab Results   Component Value Date    TSH 1.170 12/28/2023         No results found for: \"HGBA1C\"  "

## 2024-08-25 NOTE — PROGRESS NOTES
" Ebenezer Torres MD, [facility]  Cardiology    [facility]ART [facility]    [facility]  The Heart [address]  [address] [place] [address]    TEL  [phone]  [facility]     08/29/24     Dear Dr. Deacon Hair:    It was my pleasure to see Ms. Kelly Meeks at the [facility] today.      As you know, this is a 73-year-old woman with a history of polygenic hypercholesterolemia, elevated LP(a), statin intolerance, aortic stenosis, thoracic aortic calcification, family history of premature coronary artery disease and supraventricular tachycardia.  She previously followed with my colleague, Dr. Saucedo.  She follows with Dr. Muse for SVT.      She was seen by Dr. Muse in May.  At that time her verapamil was continued.  She last saw Dr. Saucedo in January. She has been treated with atorvastatin 10 mg daily, ezetimibe 10 mg daily, and niacin.  Due to suboptimally controlled LDL Lipitor was changed to rosuvastatin 5 mg Monday, Wednesday, and Friday.  Niacin was increased as well.  In follow-up rosuvastatin was increased to 4 days/week and ezetimibe 3 days/week.  She tells me on this regimen she continues to have muscle cramps similar to what she has experienced with other statins.  She has been under a lot of stress.  There have been some dietary changes in recent months.  Symptomatically her primary concern is occasional extra beats and a \"buzzing\" in her chest.  No chest pain or pressure.  No dyspnea.  No lower extremity edema, orthopnea, or PND.  No syncope.  She walks for exercise.    Past Medical History:  Past Medical History:   Diagnosis Date    Anxiety     Asthma     Coronary artery disease     Hyperlipidemia     Hypothyroidism      Past Surgical History:  Past Surgical History:   Procedure Laterality Date    ADENOIDECTOMY      CARDIAC CATHETERIZATION  1976    TONSILLECTOMY       Medications:  Current Outpatient Medications   Medication Sig " Dispense Refill    acetylcysteine (MUCOMYST) 200 mg/mL (20 %) nebulizer solution Take 4 mL by nebulization every 4 (four) hours.      albuterol HFA 90 mcg/actuation inhaler INHALE TWO (2) PUFFS BY MOUTH  FOUR (4) (FOUR) TIMES A DAY AS NEEDED FOR WHEEZING. 8.5 each 0    alpha lipoic acid 200 mg capsule Take 1 tablet by mouth once daily.      ascorbic acid (VITAMIN C) 500 mg tablet Take 1,000 mg by mouth daily.      cholecalciferol, vitamin D3, (VITAMIN D3) 2,000 unit tablet Take 2,000 Units by mouth daily.      coenzyme Q10 (CO Q-10) 10 mg capsule Take 10 mg by mouth daily.      DOCOSAHEXANOIC ACID/EPA (FISH OIL ORAL) Take 3,000 mg by mouth daily.      evolocumab (REPATHA SURECLICK) 140 mg/mL pen Inject 1 mL (140 mg total) under the skin every 14 (fourteen) days. 2 mL 3    ezetimibe (ZETIA) 10 mg tablet TAKE ONE (1) TABLET (10 MG TOTAL) BY MOUTH THREE (3) (THREE) TIMES A WEEK (TUE, THU, SAT). 36 tablet 3    famotidine (PEPCID) 20 mg tablet TAKE ONE TABLET BY MOUTH ONCE DAILY 90 tablet 3    levothyroxine (SYNTHROID) 100 mcg tablet TAKE ONE TABLET BY MOUTH ONCE DAILY 90 tablet 3    melatonin 5 mg capsule Take 13 mg by mouth nightly.      montelukast (SINGULAIR) 10 mg tablet TAKE ONE (1) TABLET (10 MG TOTAL) BY MOUTH NIGHTLY. 90 tablet 3    niacin (ENDUR-ACIN) 500 mg CR tablet Take 2 tablets (1,000 mg total) by mouth nightly. 180 tablet 3    rosuvastatin (CRESTOR) 5 mg tablet Take 1 tablet (5 mg total) by mouth 4 (four) times a week (Mon, Wed, Fri, Sat). 52 tablet 3    verapamiL (CALAN) 40 mg tablet TAKE ONE TABLET BY MOUTH ONCE DAILY WHEN NEEDED (Patient taking differently: Take 40 mg by mouth daily. TAKE ONE TABLET BY MOUTH ONCE DAILY WHEN NEEDED) 90 tablet 3    cetirizine (ZyrTEC) 10 mg tablet Take 1 tablet (10 mg total) by mouth nightly. (Patient taking differently: Take 10 mg by mouth nightly as needed.) 90 tablet 1    verapamil SR (CALAN-SR) 240 mg CR tablet TAKE ONE (1) CAPSULE (240 MG TOTAL) BY MOUTH NIGHTLY.  (Patient not taking: Reported on 8/29/2024) 90 tablet 3     No current facility-administered medications for this visit.       Allergies: Epinephrine and Procaine    Social History: The patient lives with her daughter.  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  Occasional glass of wine.  She quit smoking 15 years ago.    Family History: Father had multiple myocardial infarctions in his 30's.  Mother with possible coronary artery disease and stroke.  Brother with hypertension.    Review of Systems: A complete 14-point review of systems is negative, except as noted in the HPI.    Exam:  Objective   Vitals:    08/29/24 1804   BP: 138/82   Pulse: 61   SpO2: 97%     Body mass index is 25.9 kg/m².  Wt Readings from Last 3 Encounters:   08/29/24 64.2 kg (141 lb 9.6 oz)   06/27/24 66.7 kg (147 lb)   05/09/24 66.7 kg (147 lb)     Constitutional: Appears comfortable.   Eyes: No icterus.   ENT: Deferred.   Neck: No jugular venous distention.   Vascular: No carotid bruits.   Cardiac: Normal S1 and S2, regular rhythm.  There is a 2/6 systolic murmur at the base.  Lungs: Clear to auscultation bilaterally.    GI: Nondistended.  Extremities: Warm. No lower extremity edema.   Skin: Dry.  Neurologic: Awake, alert, oriented.    Psychiatric: No agitation.    Labs: Personally reviewed and discussed with the patient.  Notable for the following.   Lab Results   Component Value Date    LDLCALC 143 (H) 08/26/2024    LDLCALC 119 (H) 06/27/2023    CHOL 242 (H) 08/26/2024    CHOL 235 (H) 06/27/2023    TRIG 63 08/26/2024    TRIG 69 06/27/2023    HDL 89 08/26/2024     06/27/2023     08/26/2024     06/27/2023    K 4.5 08/26/2024    K 4.1 06/27/2023    BUN 15 08/26/2024    BUN 16 06/27/2023    CREATININE 0.74 08/26/2024    CREATININE 0.8 06/27/2023    WBC 3.96 05/27/2022    HGB 13.9 05/27/2022     05/27/2022     Lab Results   Component Value Date    ALT 24 08/26/2024    AST 22  "08/26/2024    ALKPHOS 68 08/26/2024    BILITOT 0.3 08/26/2024       Cardiovascular Studies:   Coronary calcium score, 6/2019: Composite 0.  Aorta unremarkable.  Carotid ultrasound, 2019: Mild plaque bilaterally  Stress echo, 4/2021: No ischemia.  TTE, 6/2024: Normal LV size, wall thickness, LVEF 65 to 70%.  No regional abnormalities.  Normal RV size/function.  Normal LA size.  Mild to moderate aortic stenosis (2.7/15/1.3).  Mild MR.  Mild TR.    ECG from today personally reviewed and discussed with the patient shows sinus rhythm.      Assessment/Plan     Hypercholesterolemia/relative statin intolerance/elevated LP(a)/carotid atherosclerosis:  Her initial lipid profile revealed an LDL of 146, total cholesterol 228, HDL 62, triglycerides 102.  Her LP(a) was found to be 420 nmol/L.  She has a history of relative statin intolerance.  On her current regimen her LDL is far from target. After discussing the risks and benefits of the possible therapeutic options, we decided to proceed with PCSK9 inhibitor therapy.  Evolocumab (Repatha) 140 mg every 2 weeks will be prescribed.  The mechanism of action and potential side effects were reviewed in detail.  Following the insurance approval process, our office will arrange a teaching session where the patient will learn to properly administer the medication. We will obtain a repeat lipid panel in approximately 8 weeks.      Aortic stenosis:  Mild to moderate by echocardiography earlier this year.  Preserved biventricular systolic function.  Will plan on a repeat echocardiogram in 1-2 years.    Supraventricular tachycardia/palpitations:  She is in sinus rhythm today.  Will obtain an ambulatory monitor for further evaluation of her palpitations and \"buzzing\".     Family history of premature coronary artery disease:  This history was reviewed.  By report she has an elevated LP(a).  She has no anginal symptoms.    Elevated blood pressure reading:  Blood pressure mildly elevated in " the office today.  It has been well-controlled at other recent office visits with other providers.  Will continue to monitor.      It was my pleasure to visit with Kelly Meeks in clinic today.  She will follow-up in 3 months, or sooner as needed.  Please do not hesitate to contact me with any questions.      Sincerely,       ________________  Ebenezer Torres MD      I spent  41 minutes on this date of service performing the following activities: obtaining history, performing examination, entering orders, documenting, preparing for visit, obtaining / reviewing records, providing counseling and education, communicating results, and coordinating care.

## 2024-08-27 LAB
ALBUMIN SERPL-MCNC: 4 G/DL (ref 3.8–4.8)
ALP SERPL-CCNC: 68 IU/L (ref 44–121)
ALT SERPL-CCNC: 24 IU/L (ref 0–32)
AST SERPL-CCNC: 22 IU/L (ref 0–40)
BILIRUB SERPL-MCNC: 0.3 MG/DL (ref 0–1.2)
BUN SERPL-MCNC: 15 MG/DL (ref 8–27)
BUN/CREAT SERPL: 20 (ref 12–28)
CALCIUM SERPL-MCNC: 8.9 MG/DL (ref 8.7–10.3)
CHLORIDE SERPL-SCNC: 107 MMOL/L (ref 96–106)
CHOLEST SERPL-MCNC: 242 MG/DL (ref 100–199)
CO2 SERPL-SCNC: 26 MMOL/L (ref 20–29)
CREAT SERPL-MCNC: 0.74 MG/DL (ref 0.57–1)
EGFRCR SERPLBLD CKD-EPI 2021: 85 ML/MIN/1.73
GLOBULIN SER CALC-MCNC: 2.3 G/DL (ref 1.5–4.5)
GLUCOSE SERPL-MCNC: 86 MG/DL (ref 70–99)
HDLC SERPL-MCNC: 89 MG/DL
LDLC SERPL CALC-MCNC: 143 MG/DL (ref 0–99)
POTASSIUM SERPL-SCNC: 4.5 MMOL/L (ref 3.5–5.2)
PROT SERPL-MCNC: 6.3 G/DL (ref 6–8.5)
SODIUM SERPL-SCNC: 142 MMOL/L (ref 134–144)
TRIGL SERPL-MCNC: 63 MG/DL (ref 0–149)
VLDLC SERPL CALC-MCNC: 10 MG/DL (ref 5–40)

## 2024-08-29 ENCOUNTER — OFFICE VISIT (OUTPATIENT)
Dept: CARDIOLOGY | Facility: CLINIC | Age: 74
End: 2024-08-29
Payer: MEDICARE

## 2024-08-29 VITALS
WEIGHT: 141.6 LBS | OXYGEN SATURATION: 97 % | BODY MASS INDEX: 26.06 KG/M2 | SYSTOLIC BLOOD PRESSURE: 138 MMHG | DIASTOLIC BLOOD PRESSURE: 82 MMHG | HEART RATE: 61 BPM | HEIGHT: 62 IN

## 2024-08-29 DIAGNOSIS — I65.29 CAROTID ATHEROSCLEROSIS, UNSPECIFIED LATERALITY: ICD-10-CM

## 2024-08-29 DIAGNOSIS — Z78.9 STATIN INTOLERANCE: ICD-10-CM

## 2024-08-29 DIAGNOSIS — E78.41 ELEVATED LP(A): ICD-10-CM

## 2024-08-29 DIAGNOSIS — I35.0 NONRHEUMATIC AORTIC VALVE STENOSIS: ICD-10-CM

## 2024-08-29 DIAGNOSIS — R03.0 ELEVATED BLOOD PRESSURE READING: ICD-10-CM

## 2024-08-29 DIAGNOSIS — R00.2 PALPITATIONS: ICD-10-CM

## 2024-08-29 DIAGNOSIS — E78.5 DYSLIPIDEMIA: Primary | ICD-10-CM

## 2024-08-29 LAB
ATRIAL RATE: 61
P AXIS: 78
PR INTERVAL: 174
QRS DURATION: 90
QT INTERVAL: 424
QTC CALCULATION(BAZETT): 426
R AXIS: 24
T WAVE AXIS: 48
VENTRICULAR RATE: 61

## 2024-08-29 PROCEDURE — 93000 ELECTROCARDIOGRAM COMPLETE: CPT | Mod: XU | Performed by: INTERNAL MEDICINE

## 2024-08-29 PROCEDURE — 99215 OFFICE O/P EST HI 40 MIN: CPT | Performed by: INTERNAL MEDICINE

## 2024-08-29 RX ORDER — EVOLOCUMAB 140 MG/ML
140 INJECTION, SOLUTION SUBCUTANEOUS
Qty: 2 ML | Refills: 3 | Status: SHIPPED | OUTPATIENT
Start: 2024-08-29 | End: 2024-09-03 | Stop reason: SDUPTHER

## 2024-08-29 RX ORDER — ACETYLCYSTEINE 200 MG/ML
4 SOLUTION ORAL; RESPIRATORY (INHALATION)
COMMUNITY

## 2024-08-29 RX ORDER — CEPHRADINE 500 MG
1 CAPSULE ORAL DAILY
COMMUNITY
End: 2025-01-21

## 2024-08-29 RX ORDER — MELATONIN 5 MG
13 CAPSULE ORAL NIGHTLY
COMMUNITY

## 2024-08-29 RX ORDER — ASCORBIC ACID 500 MG
1000 TABLET ORAL DAILY
COMMUNITY
End: 2025-01-21

## 2024-08-30 ENCOUNTER — TELEPHONE (OUTPATIENT)
Dept: CARDIOLOGY | Facility: CLINIC | Age: 74
End: 2024-08-30
Payer: MEDICARE

## 2024-08-30 NOTE — TELEPHONE ENCOUNTER
Prescribed Repatha.  Can you please work on the prior authorization and follow-up with the patient?  Thank you.

## 2024-09-03 ENCOUNTER — TELEPHONE (OUTPATIENT)
Dept: CARDIOLOGY | Facility: CLINIC | Age: 74
End: 2024-09-03
Payer: MEDICARE

## 2024-09-03 DIAGNOSIS — I65.29 CAROTID ATHEROSCLEROSIS, UNSPECIFIED LATERALITY: ICD-10-CM

## 2024-09-03 DIAGNOSIS — E78.41 ELEVATED LP(A): ICD-10-CM

## 2024-09-03 DIAGNOSIS — E78.5 DYSLIPIDEMIA: ICD-10-CM

## 2024-09-03 DIAGNOSIS — Z78.9 STATIN INTOLERANCE: ICD-10-CM

## 2024-09-03 NOTE — TELEPHONE ENCOUNTER
Medicine Refill Request  Pt called requests a refill of       evolocumab (REPATHA SURECLICK) 140 mg/mL pen         Pt wants to see if the medication will be cheaper at the other pharmacy      Last Office Visit: Visit date not found   Last Consult Visit: Visit date not found  Last Telemedicine Visit: Visit date not found    Next Appointment: Visit date not found      Current Outpatient Medications:     acetylcysteine (MUCOMYST) 200 mg/mL (20 %) nebulizer solution, Take 4 mL by nebulization every 4 (four) hours., Disp: , Rfl:     albuterol HFA 90 mcg/actuation inhaler, INHALE TWO (2) PUFFS BY MOUTH  FOUR (4) (FOUR) TIMES A DAY AS NEEDED FOR WHEEZING., Disp: 8.5 each, Rfl: 0    alpha lipoic acid 200 mg capsule, Take 1 tablet by mouth once daily., Disp: , Rfl:     ascorbic acid (VITAMIN C) 500 mg tablet, Take 1,000 mg by mouth daily., Disp: , Rfl:     cetirizine (ZyrTEC) 10 mg tablet, Take 1 tablet (10 mg total) by mouth nightly. (Patient taking differently: Take 10 mg by mouth nightly as needed.), Disp: 90 tablet, Rfl: 1    cholecalciferol, vitamin D3, (VITAMIN D3) 2,000 unit tablet, Take 2,000 Units by mouth daily., Disp: , Rfl:     coenzyme Q10 (CO Q-10) 10 mg capsule, Take 10 mg by mouth daily., Disp: , Rfl:     DOCOSAHEXANOIC ACID/EPA (FISH OIL ORAL), Take 3,000 mg by mouth daily., Disp: , Rfl:     evolocumab (REPATHA SURECLICK) 140 mg/mL pen, Inject 1 mL (140 mg total) under the skin every 14 (fourteen) days., Disp: 2 mL, Rfl: 3    ezetimibe (ZETIA) 10 mg tablet, TAKE ONE (1) TABLET (10 MG TOTAL) BY MOUTH THREE (3) (THREE) TIMES A WEEK (TUE, THU, SAT)., Disp: 36 tablet, Rfl: 3    famotidine (PEPCID) 20 mg tablet, TAKE ONE TABLET BY MOUTH ONCE DAILY, Disp: 90 tablet, Rfl: 3    levothyroxine (SYNTHROID) 100 mcg tablet, TAKE ONE TABLET BY MOUTH ONCE DAILY, Disp: 90 tablet, Rfl: 3    melatonin 5 mg capsule, Take 13 mg by mouth nightly., Disp: , Rfl:     montelukast (SINGULAIR) 10 mg tablet, TAKE ONE (1) TABLET (10 MG  "TOTAL) BY MOUTH NIGHTLY., Disp: 90 tablet, Rfl: 3    niacin (ENDUR-ACIN) 500 mg CR tablet, Take 2 tablets (1,000 mg total) by mouth nightly., Disp: 180 tablet, Rfl: 3    rosuvastatin (CRESTOR) 5 mg tablet, Take 1 tablet (5 mg total) by mouth 4 (four) times a week (Mon, Wed, Fri, Sat)., Disp: 52 tablet, Rfl: 3    verapamiL (CALAN) 40 mg tablet, TAKE ONE TABLET BY MOUTH ONCE DAILY WHEN NEEDED (Patient taking differently: Take 40 mg by mouth daily. TAKE ONE TABLET BY MOUTH ONCE DAILY WHEN NEEDED), Disp: 90 tablet, Rfl: 3    verapamil SR (CALAN-SR) 240 mg CR tablet, TAKE ONE (1) CAPSULE (240 MG TOTAL) BY MOUTH NIGHTLY. (Patient not taking: Reported on 8/29/2024), Disp: 90 tablet, Rfl: 3      BP Readings from Last 3 Encounters:   08/29/24 138/82   06/27/24 124/78   05/09/24 124/72       Recent Lab results:  Lab Results   Component Value Date    CHOL 242 (H) 08/26/2024   ,   Lab Results   Component Value Date    HDL 89 08/26/2024   ,   Lab Results   Component Value Date    LDLCALC 143 (H) 08/26/2024   ,   Lab Results   Component Value Date    TRIG 63 08/26/2024        Lab Results   Component Value Date    GLUCOSE 86 08/26/2024   , No results found for: \"HGBA1C\"      Lab Results   Component Value Date    CREATININE 0.74 08/26/2024       Lab Results   Component Value Date    TSH 1.170 12/28/2023         No results found for: \"HGBA1C\"  "

## 2024-09-03 NOTE — TELEPHONE ENCOUNTER
Repatha Prior authorization started-- sent to plan along with supporting documentation.    [employer] Type of coverage approved: Prior Authorization This approval authorizes your coverage from 01/01/2024 - 09/03/2025,    PT advised via [employer]

## 2024-09-04 RX ORDER — EVOLOCUMAB 140 MG/ML
140 INJECTION, SOLUTION SUBCUTANEOUS
Qty: 6 ML | Refills: 3 | Status: SHIPPED | OUTPATIENT
Start: 2024-09-04 | End: 2024-11-22 | Stop reason: SDUPTHER

## 2024-09-09 ENCOUNTER — TELEPHONE (OUTPATIENT)
Dept: FAMILY MEDICINE | Facility: CLINIC | Age: 74
End: 2024-09-09
Payer: MEDICARE

## 2024-09-13 ENCOUNTER — TELEPHONE (OUTPATIENT)
Dept: CARDIOLOGY | Facility: CLINIC | Age: 74
End: 2024-09-13
Payer: MEDICARE

## 2024-09-13 ENCOUNTER — TELEPHONE (OUTPATIENT)
Dept: SCHEDULING | Facility: CLINIC | Age: 74
End: 2024-09-13
Payer: MEDICARE

## 2024-09-13 NOTE — TELEPHONE ENCOUNTER
"Spoke with patient about preliminary Zio report results. 1 short SVT, 1 short NSVT, rare PVCs. Patient was not having significant symptoms during the monitor. After the monitor she has had more \"thumping.\"  No clear precipitating factors.  No syncope.  No other new cardiopulmonary symptoms.  She has been more fatigued recently.  This started following her recent COVID vaccination.  I reviewed the preliminary results with Dr. Muse.  She recommended no changes to her current pharmacotherapy and no additional workup for the short run of NSVT.  The patient plans to use her as needed verapamil for significant symptoms.  I suggested that she call Dr. Muse on Monday with an update if she is still having significant symptoms.  The patient agrees with this plan.  All questions answered.  She appreciated the call.  "

## 2024-09-13 NOTE — TELEPHONE ENCOUNTER
Patient called stating she  sent monitor back and is asking for results.     Pt thinks she may have had symptoms that were not captured.      Please call her at  [phone] to discuss. ty

## 2024-09-13 NOTE — TELEPHONE ENCOUNTER
Pt calling for results of ZIO patch. Please advise if available for Dr. Torres's review.   Thank you!

## 2024-09-13 NOTE — TELEPHONE ENCOUNTER
Patient  called stating that she is now on Repatha.She started 1 week ago.    Any further refills need to go to  needs to go to [facility] in [place].  This is an FYI. ty

## 2024-09-13 NOTE — TELEPHONE ENCOUNTER
Original prescription was sent to [facility] in [place]. Nothing further required

## 2024-09-13 NOTE — TELEPHONE ENCOUNTER
Pt called back and asked for an update on results     Pt asked for Dr Torres to call her     Phn: [phone]

## 2024-09-16 NOTE — TELEPHONE ENCOUNTER
She should keep November appointment unless she calls with symptoms.  He suggested to call only if symptoms.

## 2024-09-16 NOTE — TELEPHONE ENCOUNTER
No future appt scheduled. Would you like to see her in November which would be her 6 month follow up?

## 2024-09-16 NOTE — TELEPHONE ENCOUNTER
Pt would like to have an appt this week or next in regards to her results. Pt states it can be a telemed visit also. Please advise.     Pt can be reached at [phone]

## 2024-09-18 ENCOUNTER — TELEPHONE (OUTPATIENT)
Dept: CARDIOLOGY | Facility: CLINIC | Age: 74
End: 2024-09-18
Payer: MEDICARE

## 2024-09-18 NOTE — TELEPHONE ENCOUNTER
I spoke to patient in detail and reviewed the Zio report and Dr. Muse's recommendations. Patient states that she had more severe symptoms after she was already off the monitor, therefore were not recorded. The past 2 days her palpitations have been quiet. Patient will continue taking Verapamil for now at 240mg daily. She will switch to taking it in the AM instead of at night. She will also continue to take short-acting verapamil 40 mg PRN. I discussed other medication options for patient if Verapamil continues not to work well for suppression, but given her history of allergies/asthma, beta blocker may not be ideal. I will discuss with Dr. Muse if switching to Cardizem would be an option if she continues to have episodes on Verapamil. Patient states that her arrhythmia exacerbation may be due to fall allergies, since she tends to have worsening arrhythmias in September for the past 2 years. I also discussed future monitoring to capture more severe episodes if she continues to have them. Patient should schedule a routine visit with Dr. Muse (November/December).

## 2024-09-18 NOTE — TELEPHONE ENCOUNTER
Please let patient know that the finalized Zio patch report is unchanged from the preliminary report that we discussed last week.  It looks like she plans to make an appointment with Dr. Muse.  I would recommend that she follow-up with Dr. Muse to discuss the results and next steps.  Thanks.

## 2024-09-18 NOTE — TELEPHONE ENCOUNTER
Called and spoke with pt. Advised Dr. Torres reviewed her finalized ROSANNAO patch report and states it is unchanged from the preliminary report as was discussed with Dr. Torres last week. She states she has been unable to make a sooner appointment with Dr. Muse's office and has been waiting for a return call.     EP: Pt is requesting sooner appointment than November and would like a call to discuss.   Thank you!

## 2024-09-20 RX ORDER — VERAPAMIL HYDROCHLORIDE 40 MG/1
TABLET ORAL
Qty: 90 TABLET | Refills: 3 | Status: SHIPPED | OUTPATIENT
Start: 2024-09-20

## 2024-09-20 NOTE — TELEPHONE ENCOUNTER
Medicine Refill Request [facility]    Name of Patient: Kelly Meeks    Caller's name/Relationship: self    Callback number: [phone]    Medication Name, Dosage, Supply: verapamiL (CALAN) 40 mg tablet     Quantity left: 0    Pharmacy: [facility]    Last Office Visit: 5/9/24  Last Consult Visit: Visit date not found  Last Telemedicine Visit: Visit date not found    Next Appointment: Visit date not found      Current Outpatient Medications:     acetylcysteine (MUCOMYST) 200 mg/mL (20 %) nebulizer solution, Take 4 mL by nebulization every 4 (four) hours., Disp: , Rfl:     albuterol HFA 90 mcg/actuation inhaler, INHALE TWO (2) PUFFS BY MOUTH  FOUR (4) (FOUR) TIMES A DAY AS NEEDED FOR WHEEZING., Disp: 8.5 each, Rfl: 0    alpha lipoic acid 200 mg capsule, Take 1 tablet by mouth once daily., Disp: , Rfl:     ascorbic acid (VITAMIN C) 500 mg tablet, Take 1,000 mg by mouth daily., Disp: , Rfl:     cetirizine (ZyrTEC) 10 mg tablet, Take 1 tablet (10 mg total) by mouth nightly. (Patient taking differently: Take 10 mg by mouth nightly as needed.), Disp: 90 tablet, Rfl: 1    cholecalciferol, vitamin D3, (VITAMIN D3) 2,000 unit tablet, Take 2,000 Units by mouth daily., Disp: , Rfl:     coenzyme Q10 (CO Q-10) 10 mg capsule, Take 10 mg by mouth daily., Disp: , Rfl:     DOCOSAHEXANOIC ACID/EPA (FISH OIL ORAL), Take 3,000 mg by mouth daily., Disp: , Rfl:     evolocumab (REPATHA SURECLICK) 140 mg/mL pen, Inject 1 mL (140 mg total) under the skin every 14 (fourteen) days., Disp: 6 mL, Rfl: 3    ezetimibe (ZETIA) 10 mg tablet, TAKE ONE (1) TABLET (10 MG TOTAL) BY MOUTH THREE (3) (THREE) TIMES A WEEK (TUE, THU, SAT)., Disp: 36 tablet, Rfl: 3    famotidine (PEPCID) 20 mg tablet, TAKE ONE TABLET BY MOUTH ONCE DAILY, Disp: 90 tablet, Rfl: 3    levothyroxine (SYNTHROID) 100 mcg tablet, TAKE ONE TABLET BY MOUTH ONCE DAILY, Disp: 90 tablet, Rfl: 3    melatonin 5 mg capsule, Take 13 mg by mouth nightly., Disp: , Rfl:     montelukast  "(SINGULAIR) 10 mg tablet, TAKE ONE (1) TABLET (10 MG TOTAL) BY MOUTH NIGHTLY., Disp: 90 tablet, Rfl: 3    niacin (ENDUR-ACIN) 500 mg CR tablet, Take 2 tablets (1,000 mg total) by mouth nightly., Disp: 180 tablet, Rfl: 3    rosuvastatin (CRESTOR) 5 mg tablet, Take 1 tablet (5 mg total) by mouth 4 (four) times a week (Mon, Wed, Fri, Sat)., Disp: 52 tablet, Rfl: 3    verapamiL (CALAN) 40 mg tablet, TAKE ONE TABLET BY MOUTH ONCE DAILY WHEN NEEDED (Patient taking differently: Take 40 mg by mouth daily. TAKE ONE TABLET BY MOUTH ONCE DAILY WHEN NEEDED), Disp: 90 tablet, Rfl: 3    verapamil SR (CALAN-SR) 240 mg CR tablet, TAKE ONE (1) CAPSULE (240 MG TOTAL) BY MOUTH NIGHTLY. (Patient not taking: Reported on 8/29/2024), Disp: 90 tablet, Rfl: 3      BP Readings from Last 3 Encounters:   08/29/24 138/82   06/27/24 124/78   05/09/24 124/72       Recent Lab results:  Lab Results   Component Value Date    CHOL 242 (H) 08/26/2024   ,   Lab Results   Component Value Date    HDL 89 08/26/2024   ,   Lab Results   Component Value Date    LDLCALC 143 (H) 08/26/2024   ,   Lab Results   Component Value Date    TRIG 63 08/26/2024        Lab Results   Component Value Date    GLUCOSE 86 08/26/2024   , No results found for: \"HGBA1C\"      Lab Results   Component Value Date    CREATININE 0.74 08/26/2024       Lab Results   Component Value Date    TSH 1.170 12/28/2023           "

## 2024-09-20 NOTE — TELEPHONE ENCOUNTER
[facility]

## 2024-09-23 RX ORDER — VERAPAMIL HCL 240 MG
240 TABLET, EXTENDED RELEASE ORAL DAILY
Qty: 90 TABLET | Refills: 3 | Status: SHIPPED | OUTPATIENT
Start: 2024-09-23

## 2024-09-23 NOTE — TELEPHONE ENCOUNTER
Medicine Refill Request    Last Office Visit: Visit date not found   Last Consult Visit: Visit date not found  Last Telemedicine Visit: Visit date not found  Escribe Veralan 240mg  sustained relaese daily # 90  to [facility] . This looks to be started by pcp. ty    Next Appointment: Visit date not found      Current Outpatient Medications:     acetylcysteine (MUCOMYST) 200 mg/mL (20 %) nebulizer solution, Take 4 mL by nebulization every 4 (four) hours., Disp: , Rfl:     albuterol HFA 90 mcg/actuation inhaler, INHALE TWO (2) PUFFS BY MOUTH  FOUR (4) (FOUR) TIMES A DAY AS NEEDED FOR WHEEZING., Disp: 8.5 each, Rfl: 0    alpha lipoic acid 200 mg capsule, Take 1 tablet by mouth once daily., Disp: , Rfl:     ascorbic acid (VITAMIN C) 500 mg tablet, Take 1,000 mg by mouth daily., Disp: , Rfl:     cetirizine (ZyrTEC) 10 mg tablet, Take 1 tablet (10 mg total) by mouth nightly. (Patient taking differently: Take 10 mg by mouth nightly as needed.), Disp: 90 tablet, Rfl: 1    cholecalciferol, vitamin D3, (VITAMIN D3) 2,000 unit tablet, Take 2,000 Units by mouth daily., Disp: , Rfl:     coenzyme Q10 (CO Q-10) 10 mg capsule, Take 10 mg by mouth daily., Disp: , Rfl:     DOCOSAHEXANOIC ACID/EPA (FISH OIL ORAL), Take 3,000 mg by mouth daily., Disp: , Rfl:     evolocumab (REPATHA SURECLICK) 140 mg/mL pen, Inject 1 mL (140 mg total) under the skin every 14 (fourteen) days., Disp: 6 mL, Rfl: 3    ezetimibe (ZETIA) 10 mg tablet, TAKE ONE (1) TABLET (10 MG TOTAL) BY MOUTH THREE (3) (THREE) TIMES A WEEK (TUE, THU, SAT)., Disp: 36 tablet, Rfl: 3    famotidine (PEPCID) 20 mg tablet, TAKE ONE TABLET BY MOUTH ONCE DAILY, Disp: 90 tablet, Rfl: 3    levothyroxine (SYNTHROID) 100 mcg tablet, TAKE ONE TABLET BY MOUTH ONCE DAILY, Disp: 90 tablet, Rfl: 3    melatonin 5 mg capsule, Take 13 mg by mouth nightly., Disp: , Rfl:     montelukast (SINGULAIR) 10 mg tablet, TAKE ONE (1) TABLET (10 MG TOTAL) BY MOUTH NIGHTLY., Disp: 90 tablet, Rfl: 3     "niacin (ENDUR-ACIN) 500 mg CR tablet, Take 2 tablets (1,000 mg total) by mouth nightly., Disp: 180 tablet, Rfl: 3    rosuvastatin (CRESTOR) 5 mg tablet, Take 1 tablet (5 mg total) by mouth 4 (four) times a week (Mon, Wed, Fri, Sat)., Disp: 52 tablet, Rfl: 3    verapamiL (CALAN) 40 mg tablet, TAKE ONE TABLET BY MOUTH ONCE DAILY WHEN NEEDED, Disp: 90 tablet, Rfl: 3    verapamil SR (CALAN-SR) 240 mg CR tablet, TAKE ONE (1) CAPSULE (240 MG TOTAL) BY MOUTH NIGHTLY. (Patient not taking: Reported on 8/29/2024), Disp: 90 tablet, Rfl: 3      BP Readings from Last 3 Encounters:   08/29/24 138/82   06/27/24 124/78   05/09/24 124/72       Recent Lab results:  Lab Results   Component Value Date    CHOL 242 (H) 08/26/2024   ,   Lab Results   Component Value Date    HDL 89 08/26/2024   ,   Lab Results   Component Value Date    LDLCALC 143 (H) 08/26/2024   ,   Lab Results   Component Value Date    TRIG 63 08/26/2024        Lab Results   Component Value Date    GLUCOSE 86 08/26/2024   , No results found for: \"HGBA1C\"      Lab Results   Component Value Date    CREATININE 0.74 08/26/2024       Lab Results   Component Value Date    TSH 1.170 12/28/2023         No results found for: \"HGBA1C\"  "

## 2024-11-01 ENCOUNTER — TELEPHONE (OUTPATIENT)
Dept: FAMILY MEDICINE | Facility: CLINIC | Age: 74
End: 2024-11-01

## 2024-11-01 NOTE — TELEPHONE ENCOUNTER
[facility] Appointment Request   Provider: Dr. Deacon Hair   Appointment Type: Problem Visit   Reason for Visit: Patient would like to discuss with PCP Directly   Available Day and Time: Monday After 1:30pm   Best Contact Number: [phone]     The practice will reach out to schedule your appointment within the next 2 business days.

## 2024-11-06 ENCOUNTER — OFFICE VISIT (OUTPATIENT)
Dept: FAMILY MEDICINE | Facility: CLINIC | Age: 74
End: 2024-11-06
Payer: MEDICARE

## 2024-11-06 VITALS
OXYGEN SATURATION: 96 % | TEMPERATURE: 98.5 F | SYSTOLIC BLOOD PRESSURE: 124 MMHG | RESPIRATION RATE: 12 BRPM | WEIGHT: 146 LBS | DIASTOLIC BLOOD PRESSURE: 76 MMHG | HEIGHT: 62 IN | HEART RATE: 76 BPM | BODY MASS INDEX: 26.87 KG/M2

## 2024-11-06 DIAGNOSIS — K21.9 GASTROESOPHAGEAL REFLUX DISEASE WITHOUT ESOPHAGITIS: ICD-10-CM

## 2024-11-06 DIAGNOSIS — F41.9 ANXIETY: Primary | ICD-10-CM

## 2024-11-06 PROCEDURE — 99213 OFFICE O/P EST LOW 20 MIN: CPT | Performed by: SURGERY

## 2024-11-06 RX ORDER — SERTRALINE HYDROCHLORIDE 25 MG/1
25 TABLET, FILM COATED ORAL DAILY
Qty: 30 TABLET | Refills: 3 | Status: SHIPPED | OUTPATIENT
Start: 2024-11-06 | End: 2024-12-05

## 2024-11-06 RX ORDER — OMEPRAZOLE 40 MG/1
40 CAPSULE, DELAYED RELEASE ORAL
Qty: 30 CAPSULE | Refills: 0 | Status: SHIPPED | OUTPATIENT
Start: 2024-11-06 | End: 2024-12-05 | Stop reason: SDUPTHER

## 2024-11-06 ASSESSMENT — PAIN SCALES - GENERAL: PAINLEVEL_OUTOF10: 0-NO PAIN

## 2024-11-06 ASSESSMENT — ENCOUNTER SYMPTOMS
ROS GI COMMENTS: ACID REFLUX
DYSPHORIC MOOD: 1
SLEEP DISTURBANCE: 1
NERVOUS/ANXIOUS: 1

## 2024-11-06 NOTE — PROGRESS NOTES
Subjective        Patient ID: Kelly Meeks is a 74 y.o. female.    HPI    Acute visit: acid reflux.    Very bad recently. Taking Tums on top of the famotidine every day. No black/tarry stool, no vomiting.    Stress has been very high. Election. Daughter is pregnant and tried for , but was not successful. Sees a therapist, but not feeling like it is helping. Sleep disturbance, - waking at 2 AM and can't fall back to sleep. Tried a drink at bedtime, Dream, that helped initially but did not provided lasting help (contained melatonin and CBD).       Allergies  Meds  Problems       Review of Systems   Gastrointestinal:         Acid reflux   Psychiatric/Behavioral:  Positive for dysphoric mood and sleep disturbance. The patient is nervous/anxious.        Objective     Physical Exam  Vitals reviewed.   Constitutional:       Appearance: Normal appearance.   Neurological:      Mental Status: She is alert and oriented to person, place, and time.         Assessment/Plan   Problem List Items Addressed This Visit       Esophageal reflux     30 day trial of omeprazole. Close follow-up.         Relevant Medications    omeprazole (PriLOSEC) 40 mg capsule    Anxiety - Primary     We will start low-dose sertraline for persistent anxiety in the context of ongoing stressors. Close follow-up in 4 weeks to reassess.         Relevant Medications    sertraline (ZOLOFT) 25 mg tablet    Other Relevant Orders    Ambulatory Referal to [facility] (Completed)       Kylee Hair MD  2024   2

## 2024-11-06 NOTE — ASSESSMENT & PLAN NOTE
We will start low-dose sertraline for persistent anxiety in the context of ongoing stressors. Close follow-up in 4 weeks to reassess.

## 2024-11-13 ENCOUNTER — TELEPHONE (OUTPATIENT)
Dept: SCHEDULING | Facility: CLINIC | Age: 74
End: 2024-11-13
Payer: MEDICARE

## 2024-11-13 NOTE — TELEPHONE ENCOUNTER
Transfer Care from Provider to Provider     Name of caller: Kelly    Relationship to patient: self    Current patient of doctor: Ofelia Muse MD     Requesting to transfer care to doctor:      Please call patient to advise. Schedule appointment or forward to the office as appropriate [phone]      Additional notes:

## 2024-11-21 ENCOUNTER — TELEPHONE (OUTPATIENT)
Dept: CARDIOLOGY | Facility: CLINIC | Age: 74
End: 2024-11-21
Payer: MEDICARE

## 2024-11-21 DIAGNOSIS — E78.5 DYSLIPIDEMIA: Primary | ICD-10-CM

## 2024-11-21 LAB
CHOLEST SERPL-MCNC: 164 MG/DL (ref 100–199)
HDLC SERPL-MCNC: 90 MG/DL
LDLC SERPL CALC-MCNC: 64 MG/DL (ref 0–99)
LPA SERPL-SCNC: 205.5 NMOL/L
TRIGL SERPL-MCNC: 47 MG/DL (ref 0–149)
VLDLC SERPL CALC-MCNC: 10 MG/DL (ref 5–40)

## 2024-11-21 RX ORDER — EZETIMIBE 10 MG/1
10 TABLET ORAL DAILY
Qty: 90 TABLET | Refills: 1 | Status: SHIPPED | OUTPATIENT
Start: 2024-11-21 | End: 2025-03-24 | Stop reason: SDUPTHER

## 2024-11-21 NOTE — TELEPHONE ENCOUNTER
"Called and spoke with pt in regards to her [employer] message. Advised as per Dr. Torres's recommendations below.     \"On the labs available in our system she has never had an LDL this low.  If she is having side effects that she believes are related to the rosuvastatin that she takes 4 days/week, she can discontinue the medication and we can repeat a lipid profile in 6 weeks to see what her cholesterol looks like on Repatha and ezetimibe.  There is not a higher dose of Repatha.  Thanks.\"    We reviewed her lab results. She states she is concerned her cholesterol numbers can go up with being off the rosuvastatin and states she would rather \"put up with the leg cramps\".     EK: She requested to speak with you directly regarding her concerns when you have time.     Thank you.    "

## 2024-11-22 DIAGNOSIS — I65.29 CAROTID ATHEROSCLEROSIS, UNSPECIFIED LATERALITY: ICD-10-CM

## 2024-11-22 DIAGNOSIS — Z78.9 STATIN INTOLERANCE: ICD-10-CM

## 2024-11-22 DIAGNOSIS — E78.41 ELEVATED LP(A): ICD-10-CM

## 2024-11-22 DIAGNOSIS — E78.5 DYSLIPIDEMIA: ICD-10-CM

## 2024-11-22 RX ORDER — EVOLOCUMAB 140 MG/ML
140 INJECTION, SOLUTION SUBCUTANEOUS
Qty: 6 ML | Refills: 3 | Status: SHIPPED | OUTPATIENT
Start: 2024-11-22 | End: 2025-01-09 | Stop reason: SDUPTHER

## 2024-11-22 NOTE — TELEPHONE ENCOUNTER
Medicine Refill Request [facility]    Name of Patient: Kelly Meeks    Caller's name/Relationship: self    Callback number: [phone]      Medication Name, Dosage, Supply:     evolocumab (REPATHA SURECLICK) 140 mg/mL pen       Quantity left: out    Pharmacy: [id number]    Last Office Visit: 8/29/24  Last Consult Visit: Visit date not found  Last Telemedicine Visit: Visit date not found    Next Appointment: Visit date not found      Current Outpatient Medications:     acetylcysteine (MUCOMYST) 200 mg/mL (20 %) nebulizer solution, Take 4 mL by nebulization every 4 (four) hours., Disp: , Rfl:     albuterol HFA 90 mcg/actuation inhaler, INHALE TWO (2) PUFFS BY MOUTH  FOUR (4) (FOUR) TIMES A DAY AS NEEDED FOR WHEEZING., Disp: 8.5 each, Rfl: 0    alpha lipoic acid 200 mg capsule, Take 1 tablet by mouth once daily., Disp: , Rfl:     ascorbic acid (VITAMIN C) 500 mg tablet, Take 1,000 mg by mouth daily. (Patient not taking: Reported on 11/6/2024), Disp: , Rfl:     cetirizine (ZyrTEC) 10 mg tablet, Take 1 tablet (10 mg total) by mouth nightly. (Patient taking differently: Take 10 mg by mouth nightly as needed.), Disp: 90 tablet, Rfl: 1    cholecalciferol, vitamin D3, (VITAMIN D3) 2,000 unit tablet, Take 2,000 Units by mouth daily., Disp: , Rfl:     coenzyme Q10 (CO Q-10) 10 mg capsule, Take 10 mg by mouth daily., Disp: , Rfl:     DOCOSAHEXANOIC ACID/EPA (FISH OIL ORAL), Take 3,000 mg by mouth daily., Disp: , Rfl:     evolocumab (REPATHA SURECLICK) 140 mg/mL pen, Inject 1 mL (140 mg total) under the skin every 14 (fourteen) days., Disp: 6 mL, Rfl: 3    ezetimibe (ZETIA) 10 mg tablet, Take 1 tablet (10 mg total) by mouth daily., Disp: 90 tablet, Rfl: 1    famotidine (PEPCID) 20 mg tablet, TAKE ONE TABLET BY MOUTH ONCE DAILY, Disp: 90 tablet, Rfl: 3    levothyroxine (SYNTHROID) 100 mcg tablet, TAKE ONE TABLET BY MOUTH ONCE DAILY, Disp: 90 tablet, Rfl: 3    melatonin 5 mg capsule, Take 13 mg by mouth nightly., Disp: , Rfl:      "montelukast (SINGULAIR) 10 mg tablet, TAKE ONE (1) TABLET (10 MG TOTAL) BY MOUTH NIGHTLY., Disp: 90 tablet, Rfl: 3    niacin (ENDUR-ACIN) 500 mg CR tablet, Take 2 tablets (1,000 mg total) by mouth nightly., Disp: 180 tablet, Rfl: 3    omeprazole (PriLOSEC) 40 mg capsule, Take 1 capsule (40 mg total) by mouth daily before breakfast., Disp: 30 capsule, Rfl: 0    rosuvastatin (CRESTOR) 5 mg tablet, TAKE ONE (1) TABLET (5 MG TOTAL) BY MOUTH FOUR (4) (FOUR) TIMES A WEEK (MON, WED, FRI, SAT)., Disp: 52 tablet, Rfl: 3    sertraline (ZOLOFT) 25 mg tablet, Take 1 tablet (25 mg total) by mouth daily., Disp: 30 tablet, Rfl: 3    verapamiL (CALAN) 40 mg tablet, TAKE ONE TABLET BY MOUTH ONCE DAILY WHEN NEEDED, Disp: 90 tablet, Rfl: 3    verapamil SR (CALAN-SR) 240 mg CR tablet, Take 1 tablet (240 mg total) by mouth daily., Disp: 90 tablet, Rfl: 3      BP Readings from Last 3 Encounters:   11/06/24 124/76   08/29/24 138/82   06/27/24 124/78       Recent Lab results:  Lab Results   Component Value Date    CHOL 164 11/20/2024   ,   Lab Results   Component Value Date    HDL 90 11/20/2024   ,   Lab Results   Component Value Date    LDLCALC 64 11/20/2024   ,   Lab Results   Component Value Date    TRIG 47 11/20/2024        Lab Results   Component Value Date    GLUCOSE 86 08/26/2024   , No results found for: \"HGBA1C\"      Lab Results   Component Value Date    CREATININE 0.74 08/26/2024       Lab Results   Component Value Date    TSH 1.170 12/28/2023           "

## 2024-12-05 ENCOUNTER — TELEPHONE (OUTPATIENT)
Dept: CARDIOLOGY | Facility: CLINIC | Age: 74
End: 2024-12-05

## 2024-12-05 ENCOUNTER — OFFICE VISIT (OUTPATIENT)
Dept: CARDIOLOGY | Facility: CLINIC | Age: 74
End: 2024-12-05
Payer: MEDICARE

## 2024-12-05 ENCOUNTER — OFFICE VISIT (OUTPATIENT)
Dept: FAMILY MEDICINE | Facility: CLINIC | Age: 74
End: 2024-12-05
Payer: MEDICARE

## 2024-12-05 VITALS
SYSTOLIC BLOOD PRESSURE: 139 MMHG | HEIGHT: 62 IN | TEMPERATURE: 97.9 F | WEIGHT: 149.2 LBS | OXYGEN SATURATION: 96 % | RESPIRATION RATE: 12 BRPM | DIASTOLIC BLOOD PRESSURE: 80 MMHG | HEART RATE: 71 BPM | BODY MASS INDEX: 27.46 KG/M2

## 2024-12-05 VITALS
WEIGHT: 145 LBS | HEIGHT: 62 IN | SYSTOLIC BLOOD PRESSURE: 130 MMHG | BODY MASS INDEX: 26.68 KG/M2 | DIASTOLIC BLOOD PRESSURE: 70 MMHG | HEART RATE: 78 BPM | OXYGEN SATURATION: 98 %

## 2024-12-05 DIAGNOSIS — I35.0 AORTIC VALVE STENOSIS, ETIOLOGY OF CARDIAC VALVE DISEASE UNSPECIFIED: ICD-10-CM

## 2024-12-05 DIAGNOSIS — I47.10 SUPRAVENTRICULAR TACHYCARDIA (CMS/HCC): ICD-10-CM

## 2024-12-05 DIAGNOSIS — E78.5 DYSLIPIDEMIA: Primary | ICD-10-CM

## 2024-12-05 DIAGNOSIS — F41.9 ANXIETY: Primary | ICD-10-CM

## 2024-12-05 DIAGNOSIS — K21.9 GASTROESOPHAGEAL REFLUX DISEASE WITHOUT ESOPHAGITIS: ICD-10-CM

## 2024-12-05 DIAGNOSIS — I65.23 ARTERIOSCLEROSIS OF BOTH CAROTID ARTERIES: ICD-10-CM

## 2024-12-05 DIAGNOSIS — E78.41 ELEVATED LP(A): ICD-10-CM

## 2024-12-05 LAB
ATRIAL RATE: 71
P AXIS: 68
PR INTERVAL: 162
QRS DURATION: 88
QT INTERVAL: 398
QTC CALCULATION(BAZETT): 432
R AXIS: 66
T WAVE AXIS: 7
VENTRICULAR RATE: 71

## 2024-12-05 PROCEDURE — 99214 OFFICE O/P EST MOD 30 MIN: CPT | Performed by: INTERNAL MEDICINE

## 2024-12-05 PROCEDURE — 93000 ELECTROCARDIOGRAM COMPLETE: CPT | Performed by: INTERNAL MEDICINE

## 2024-12-05 PROCEDURE — 99214 OFFICE O/P EST MOD 30 MIN: CPT | Performed by: SURGERY

## 2024-12-05 RX ORDER — OMEPRAZOLE 40 MG/1
40 CAPSULE, DELAYED RELEASE ORAL
Qty: 90 CAPSULE | Refills: 1 | Status: SHIPPED | OUTPATIENT
Start: 2024-12-05 | End: 2025-06-03

## 2024-12-05 RX ORDER — SERTRALINE HYDROCHLORIDE 50 MG/1
50 TABLET, FILM COATED ORAL DAILY
Qty: 90 TABLET | Refills: 3 | Status: SHIPPED | OUTPATIENT
Start: 2024-12-05 | End: 2025-12-05

## 2024-12-05 ASSESSMENT — ENCOUNTER SYMPTOMS
NERVOUS/ANXIOUS: 0
DYSPHORIC MOOD: 1
GASTROINTESTINAL NEGATIVE: 1
SLEEP DISTURBANCE: 1

## 2024-12-05 ASSESSMENT — PAIN SCALES - GENERAL: PAINLEVEL_OUTOF10: 0-NO PAIN

## 2024-12-05 NOTE — ASSESSMENT & PLAN NOTE
We will increase her Sertraline dose to 50 mg daily. I suspect her sleep will improve at the increased dose.  New recommendation provided for Dr. May Landrum in [place] - for psychotherapy.

## 2024-12-05 NOTE — TELEPHONE ENCOUNTER
Patient states that she needs a formulary exception for Repatha.  Can you please assist and follow-up with the patient?  Thanks.

## 2024-12-05 NOTE — ASSESSMENT & PLAN NOTE
She will trial off the Omeprazole to see if symptoms return. Discussed can return to use of PPI if needed. Famotidine has not been helpful previously.

## 2024-12-05 NOTE — PROGRESS NOTES
Subjective      Patient ID: Kelly Meeks is a 74 y.o. female.    [facility]    Follow-up.  Last visit 11/6/24 - breakthrough GERD and anxiety.    GERD. Omeprazole 40 mg x30 days. Here to review symptoms/outcome.   Has felt much better. No symptoms on the omeprazole.  Diet has returned to full/normal.  Denies vomiting, blood in stool, melanic stools, weight loss.    Anxiety. Started Sertraline 25 mg. Ongoing stress- family, election, sleep disturbance. Also referred to [facility].  Taking regularly.   Side effects - none.  Mood - more calm.  Sleep - better, sleeps until 5 AM, may be awake until 6:30 AM, then falls back to sleep.  She has no difficulty falling asleep.   Beam supplement- chocolate drink at bedtime, helpful.     She is planning to start Reclast, but this is on back-order until the Spring.    She continues to do well on Repatha.            Allergies  Meds  Problems       Review of Systems   Gastrointestinal: Negative.    Psychiatric/Behavioral:  Positive for dysphoric mood and sleep disturbance. Negative for self-injury and suicidal ideas. The patient is not nervous/anxious.        Objective     Physical Exam  Vitals reviewed.   Constitutional:       General: She is not in acute distress.     Appearance: Normal appearance. She is normal weight.   Cardiovascular:      Rate and Rhythm: Normal rate and regular rhythm.      Pulses: Normal pulses.      Heart sounds: Normal heart sounds. No murmur heard.  Pulmonary:      Effort: Pulmonary effort is normal. No respiratory distress.      Breath sounds: No wheezing, rhonchi or rales.   Neurological:      General: No focal deficit present.      Mental Status: She is alert and oriented to person, place, and time. Mental status is at baseline.   Psychiatric:         Attention and Perception: Attention normal.         Mood and Affect: Mood is not anxious or depressed. Affect is flat.         Speech: Speech normal.         Behavior: Behavior normal.         Thought  Content: Thought content normal.         Cognition and Memory: Cognition normal.         Judgment: Judgment normal.         Assessment/Plan   Problem List Items Addressed This Visit       Esophageal reflux     She will trial off the Omeprazole to see if symptoms return. Discussed can return to use of PPI if needed. Famotidine has not been helpful previously.         Relevant Medications    omeprazole (PriLOSEC) 40 mg capsule    Anxiety - Primary     We will increase her Sertraline dose to 50 mg daily. I suspect her sleep will improve at the increased dose.  New recommendation provided for Dr. May Landrum in [place] - for psychotherapy.         Relevant Medications    sertraline (ZOLOFT) 50 mg tablet       Kylee Hair MD  12/5/2024

## 2024-12-05 NOTE — PROGRESS NOTES
Ebenezer Torres MD, [facility]  Cardiology    [facility]    [facility]  [facility] [address]  [address] [place] [address]    TEL  [phone]  [facility]     12/05/24    Dear Dr. Deacon Hair:    It was my pleasure to see Ms. Kelly Meeks at the [facility] today.      As you know, this is a 74-year-old woman with a history of polygenic hypercholesterolemia, elevated LP(a), statin intolerance, aortic stenosis, thoracic aortic calcification, family history of premature coronary artery disease and supraventricular tachycardia.      Following her last visit Repatha was initiated.  After her follow-up lab testing rosuvastatin was discontinued due to muscle symptoms.  Her muscle symptoms have improved off rosuvastatin.  She is now taking ezetimibe 3 days/week.  No new cardiopulmonary concerns or complaints.  Her previously described palpitations have resolved.  No chest pain or pressure.  No dyspnea.  No lower extremity edema or syncope.  No changes to her diet or exercise routine.    Past Medical History:  Past Medical History:   Diagnosis Date    Anxiety     Asthma     Coronary artery disease     Hyperlipidemia     Hypothyroidism      Past Surgical History:  Past Surgical History   Procedure Laterality Date    Adenoidectomy      Cardiac catheterization  1976    Tonsillectomy       Medications:  Current Outpatient Medications   Medication Sig Dispense Refill    acetylcysteine (MUCOMYST) 200 mg/mL (20 %) nebulizer solution Take 4 mL by nebulization every 4 (four) hours.      albuterol HFA 90 mcg/actuation inhaler INHALE TWO (2) PUFFS BY MOUTH  FOUR (4) (FOUR) TIMES A DAY AS NEEDED FOR WHEEZING. 8.5 each 0    cetirizine (ZyrTEC) 10 mg tablet Take 1 tablet (10 mg total) by mouth nightly. (Patient taking differently: Take 10 mg by mouth nightly as needed.) 90 tablet 1    cholecalciferol, vitamin D3, (VITAMIN D3) 2,000 unit tablet Take 2,000 Units  by mouth daily.      coenzyme Q10 (CO Q-10) 10 mg capsule Take 10 mg by mouth daily.      DOCOSAHEXANOIC ACID/EPA (FISH OIL ORAL) Take 3,000 mg by mouth daily.      evolocumab (REPATHA SURECLICK) 140 mg/mL pen Inject 1 mL (140 mg total) under the skin every 14 (fourteen) days. 6 mL 3    ezetimibe (ZETIA) 10 mg tablet Take 1 tablet (10 mg total) by mouth daily. 90 tablet 1    famotidine (PEPCID) 20 mg tablet TAKE ONE TABLET BY MOUTH ONCE DAILY 90 tablet 3    levothyroxine (SYNTHROID) 100 mcg tablet TAKE ONE TABLET BY MOUTH ONCE DAILY 90 tablet 3    melatonin 5 mg capsule Take 13 mg by mouth nightly.      montelukast (SINGULAIR) 10 mg tablet TAKE ONE (1) TABLET (10 MG TOTAL) BY MOUTH NIGHTLY. 90 tablet 3    niacin (ENDUR-ACIN) 500 mg CR tablet Take 2 tablets (1,000 mg total) by mouth nightly. 180 tablet 3    omeprazole (PriLOSEC) 40 mg capsule Take 1 capsule (40 mg total) by mouth daily before breakfast. (Patient taking differently: Take 40 mg by mouth daily before breakfast. Mot taking right now) 30 capsule 0    sertraline (ZOLOFT) 25 mg tablet Take 1 tablet (25 mg total) by mouth daily. 30 tablet 3    verapamiL (CALAN) 40 mg tablet TAKE ONE TABLET BY MOUTH ONCE DAILY WHEN NEEDED 90 tablet 3    verapamil SR (CALAN-SR) 240 mg CR tablet Take 1 tablet (240 mg total) by mouth daily. 90 tablet 3    alpha lipoic acid 200 mg capsule Take 1 tablet by mouth once daily. (Patient not taking: Reported on 12/5/2024)      ascorbic acid (VITAMIN C) 500 mg tablet Take 1,000 mg by mouth daily. (Patient not taking: Reported on 12/5/2024)       No current facility-administered medications for this visit.       Allergies: Epinephrine and Procaine    Social History: The patient lives with her daughter.  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  Occasional glass of wine.  She quit smoking 15 years ago.    Family History: Father had multiple myocardial infarctions in his 30's.  Mother with  possible coronary artery disease and stroke.  Brother with hypertension.    Review of Systems: A complete 14-point review of systems is negative, except as noted in the HPI.    Exam:  Objective   Vitals:    12/05/24 1001   BP: 130/70   Pulse: 78   SpO2: 98%     Body mass index is 26.52 kg/m².  Wt Readings from Last 3 Encounters:   12/05/24 65.8 kg (145 lb)   11/06/24 66.2 kg (146 lb)   08/29/24 64.2 kg (141 lb 9.6 oz)     Constitutional: Appears comfortable.   Eyes: No icterus.   ENT: Deferred.   Neck: No jugular venous distention.   Vascular: Carotid bruits versus radiated heart sounds.  Cardiac: Normal S1 and S2, regular rhythm.  There is a 2/6 systolic murmur at the base.  Lungs: Clear to auscultation bilaterally.    GI: Nondistended.  Extremities: Warm. No lower extremity edema.   Skin: Dry.  Neurologic: Awake, alert, oriented.    Psychiatric: No agitation.    Labs: Personally reviewed and discussed with the patient.  Notable for the following.   Lab Results   Component Value Date    LDLCALC 64 11/20/2024    LDLCALC 119 (H) 06/27/2023    CHOL 164 11/20/2024    CHOL 235 (H) 06/27/2023    TRIG 47 11/20/2024    TRIG 69 06/27/2023    HDL 90 11/20/2024     06/27/2023     08/26/2024     06/27/2023    K 4.5 08/26/2024    K 4.1 06/27/2023    BUN 15 08/26/2024    BUN 16 06/27/2023    CREATININE 0.74 08/26/2024    CREATININE 0.8 06/27/2023    WBC 3.96 05/27/2022    HGB 13.9 05/27/2022     05/27/2022     Lab Results   Component Value Date    ALT 24 08/26/2024    AST 22 08/26/2024    ALKPHOS 68 08/26/2024    BILITOT 0.3 08/26/2024     LP(a) 205 nmol/L    Cardiovascular Studies:   Coronary calcium score, 6/2019: Composite 0.  Aorta unremarkable.  Carotid ultrasound, 2019: Mild plaque bilaterally  Stress echo, 4/2021: No ischemia.  TTE, 6/2024: Normal LV size, wall thickness, LVEF 65 to 70%.  No regional abnormalities.  Normal RV size/function.  Normal LA size.  Mild to moderate aortic stenosis  (2.7/15/1.3).  Mild MR.  Mild TRChalrie Tijerina, 9/2024: Sinus rhythm, average 78 bpm.  6 beats of NSVT.  1 SVT run of 9 beats.  Symptoms associated with PVCs.      ECG from today personally reviewed and discussed with the patient shows sinus rhythm with nonspecific ST changes.      Assessment/Plan     Hypercholesterolemia/relative statin intolerance/elevated LP(a)/carotid atherosclerosis:  Her initial lipid profile revealed an LDL of 146, total cholesterol 228, HDL 62, triglycerides 102.  Her LP(a) was found to be 420 nmol/L.  She has a history of relative statin intolerance.  On Repatha, rosuvastatin, and ezetimibe her LDL was well-controlled though she had myalgias with rosuvastatin that resolved following discontinuation of medication.  Will repeat a lipid profile now that she is on ezetimibe 3 days/week and Repatha.  If her LDL is above goal we will consider increasing ezetimibe to every day depending on her LDL value.  We discussed the limited data for niacin and we will consider discontinuing the medication based on her repeat lab work.  She continues to work on therapeutic lifestyle change.    I have ordered an updated carotid ultrasound.    I recommended first-degree family screening for her elevated LP(a).    Aortic stenosis:  Mild to moderate by echocardiography earlier this year.  Preserved biventricular systolic function.  Will plan on a repeat echocardiogram in 1-2 years.    Supraventricular tachycardia/palpitations:  She is in sinus rhythm today.  Palpitations have resolved.  I made no changes to her medication.  I previously spoke to Dr. Muse about her episode of NSVT and no workup was recommended.  She continues to follow with EP.    Family history of premature coronary artery disease:  This history was reviewed.  She has an elevated LP(a).  She has no anginal symptoms.    Elevated blood pressure reading:  Blood pressure mildly elevated in the office today.  It has been well-controlled at other recent  office visits with other providers.  Will continue to monitor.        It was my pleasure to visit with Kelly Meeks in clinic today.  She will follow-up in 6 months, or sooner as needed.  Please do not hesitate to contact me with any questions.        Sincerely,       ________________  Ebenezer Torres MD

## 2024-12-09 NOTE — TELEPHONE ENCOUNTER
Repatha Prior authorization started-- sent to plan along with supporting documentation.    Information regarding your request  The patient currently has access to the requested medication and a Prior Authorization is not needed for the patient/medication.     PT advised via [employer]

## 2024-12-11 ENCOUNTER — HOSPITAL ENCOUNTER (OUTPATIENT)
Dept: CARDIOLOGY | Facility: HOSPITAL | Age: 74
Discharge: HOME | End: 2024-12-11
Attending: INTERNAL MEDICINE
Payer: MEDICARE

## 2024-12-11 VITALS — BODY MASS INDEX: 27.42 KG/M2 | WEIGHT: 149 LBS | HEIGHT: 62 IN

## 2024-12-11 DIAGNOSIS — I65.23 ARTERIOSCLEROSIS OF BOTH CAROTID ARTERIES: ICD-10-CM

## 2024-12-11 LAB
BSA FOR ECHO PROCEDURE: 1.72 M2
LEFT CCA DIST DIAS: 20.1 CM/S
LEFT CCA DIST SYS: 86.5 CM/S
LEFT CCA MID DIAS: 17.4 CM/S
LEFT CCA MID SYS: 80.3 CM/S
LEFT CCA PROX DIAS: 11 CM/S
LEFT CCA PROX SYS: 57.9 CM/S
LEFT ICA DIST DIAS: 8.39 CM/S
LEFT ICA DIST SYS: 36.1 CM/S
LEFT ICA MID DIAS: 14.8 CM/S
LEFT ICA MID SYS: 43.6 CM/S
LEFT ICA PROX DIAS: 21.8 CM/S
LEFT ICA PROX SYS: 67.9 CM/S
LEFT ICA/CCA SYS: 0.78
LT BULB EDV: 11.1 CM/S
LT BULB PSV: 50.5 CM/S
LT ECA PROX EDV: 8.74 CM/S
LT ECA PROX PSV: 52.4 CM/S
LT VERTEBRAL MID EDV: 10.4 CM/S
LT VERTEBRAL MID PSV: 37.2 CM/S
RIGHT CCA DIST DIAS: 18.3 CM/S
RIGHT CCA DIST SYS: 71.8 CM/S
RIGHT CCA MID DIAS: 10.5 CM/S
RIGHT CCA MID SYS: 60.3 CM/S
RIGHT CCA PROX DIAS: 12.2 CM/S
RIGHT CCA PROX SYS: 56.2 CM/S
RIGHT ECA SYS: 56.9 CM/S
RIGHT ICA DIST DIAS: 9.61 CM/S
RIGHT ICA DIST SYS: 27.4 CM/S
RIGHT ICA MID DIAS: 16.3 CM/S
RIGHT ICA MID SYS: 49.5 CM/S
RIGHT ICA PROX DIAS: 15.6 CM/S
RIGHT ICA PROX SYS: 64.2 CM/S
RIGHT ICA/CCA SYS: 0.89
RT BULB EDV: 13.7 CM/S
RT BULB PSV: 69.1 CM/S
RT ECA PROC EDV: 7.63 CM/S
RT VERTEBRAL MID EDV: 10.3 CM/S
RT VERTEBRAL MID PSV: 33 CM/S

## 2024-12-11 PROCEDURE — 93880 EXTRACRANIAL BILAT STUDY: CPT

## 2024-12-11 PROCEDURE — 93880 EXTRACRANIAL BILAT STUDY: CPT | Mod: 26 | Performed by: INTERNAL MEDICINE

## 2024-12-16 ENCOUNTER — TELEPHONE (OUTPATIENT)
Dept: INTERNAL MEDICINE | Facility: CLINIC | Age: 74
End: 2024-12-16

## 2024-12-16 NOTE — TELEPHONE ENCOUNTER
Department: [facility] GERIATRICS & [facility]   Clinical Pool: [facility]ERIATR[facility] & [facility] CLINICAL SUPPORT P   PSR Pool: [facility] GERIATRICS & [facility] PSR P     [facility] Appointment Request   Provider: Dr Gerber  Appointment Type: New patient   Reason for Visit: New pt visit referred by Dr Hair   Available Day and Time:   Best Contact Number: [phone]    The practice will reach out to schedule your appointment within the next 2 business days.

## 2024-12-16 NOTE — TELEPHONE ENCOUNTER
"Medicine Refill Request    Last Office Visit: Visit date not found   Last Consult Visit: Visit date not found  Last Telemedicine Visit: Visit date not found    Next Appointment: Visit date not found    Current Medications[1]      BP Readings from Last 3 Encounters:   12/05/24 139/80   12/05/24 130/70   11/06/24 124/76       Recent Lab results:  Lab Results   Component Value Date    CHOL 164 11/20/2024   ,   Lab Results   Component Value Date    HDL 90 11/20/2024   ,   Lab Results   Component Value Date    LDLCALC 64 11/20/2024   ,   Lab Results   Component Value Date    TRIG 47 11/20/2024        Lab Results   Component Value Date    GLUCOSE 86 08/26/2024   , No results found for: \"HGBA1C\"      Lab Results   Component Value Date    CREATININE 0.74 08/26/2024       Lab Results   Component Value Date    TSH 1.170 12/28/2023         No results found for: \"HGBA1C\"       [1]   Current Outpatient Medications:     acetylcysteine (MUCOMYST) 200 mg/mL (20 %) nebulizer solution, Take 4 mL by nebulization every 4 (four) hours., Disp: , Rfl:     albuterol HFA 90 mcg/actuation inhaler, INHALE TWO (2) PUFFS BY MOUTH  FOUR (4) (FOUR) TIMES A DAY AS NEEDED FOR WHEEZING., Disp: 8.5 each, Rfl: 0    alpha lipoic acid 200 mg capsule, Take 1 tablet by mouth once daily. (Patient not taking: Reported on 12/5/2024), Disp: , Rfl:     ascorbic acid (VITAMIN C) 500 mg tablet, Take 1,000 mg by mouth daily. (Patient not taking: Reported on 12/5/2024), Disp: , Rfl:     cetirizine (ZyrTEC) 10 mg tablet, Take 1 tablet (10 mg total) by mouth nightly. (Patient taking differently: Take 10 mg by mouth nightly as needed.), Disp: 90 tablet, Rfl: 1    cholecalciferol, vitamin D3, (VITAMIN D3) 2,000 unit tablet, Take 2,000 Units by mouth daily., Disp: , Rfl:     coenzyme Q10 (CO Q-10) 10 mg capsule, Take 10 mg by mouth daily., Disp: , Rfl:     DOCOSAHEXANOIC ACID/EPA (FISH OIL ORAL), Take 3,000 mg by mouth daily., Disp: , Rfl:     evolocumab (REPATHA " SURECLICK) 140 mg/mL pen, Inject 1 mL (140 mg total) under the skin every 14 (fourteen) days., Disp: 6 mL, Rfl: 3    ezetimibe (ZETIA) 10 mg tablet, Take 1 tablet (10 mg total) by mouth daily., Disp: 90 tablet, Rfl: 1    levothyroxine (SYNTHROID) 100 mcg tablet, TAKE ONE TABLET BY MOUTH ONCE DAILY, Disp: 90 tablet, Rfl: 3    melatonin 5 mg capsule, Take 13 mg by mouth nightly., Disp: , Rfl:     montelukast (SINGULAIR) 10 mg tablet, TAKE ONE (1) TABLET (10 MG TOTAL) BY MOUTH NIGHTLY., Disp: 90 tablet, Rfl: 3    niacin (ENDUR-ACIN) 500 mg CR tablet, Take 2 tablets (1,000 mg total) by mouth nightly., Disp: 180 tablet, Rfl: 3    omeprazole (PriLOSEC) 40 mg capsule, Take 1 capsule (40 mg total) by mouth daily before breakfast., Disp: 90 capsule, Rfl: 1    sertraline (ZOLOFT) 50 mg tablet, Take 1 tablet (50 mg total) by mouth daily., Disp: 90 tablet, Rfl: 3    verapamiL (CALAN) 40 mg tablet, TAKE ONE TABLET BY MOUTH ONCE DAILY WHEN NEEDED, Disp: 90 tablet, Rfl: 3    verapamil SR (CALAN-SR) 240 mg CR tablet, Take 1 tablet (240 mg total) by mouth daily., Disp: 90 tablet, Rfl: 3

## 2024-12-17 RX ORDER — ALBUTEROL SULFATE 90 UG/1
INHALANT RESPIRATORY (INHALATION)
Qty: 8.5 EACH | Refills: 0 | Status: SHIPPED | OUTPATIENT
Start: 2024-12-17

## 2024-12-17 NOTE — TELEPHONE ENCOUNTER
Left voicemail for patient explaining. Offered Dr. Bran, Dr. Juarez, or Dr. Whiteside as possibilities for PCP.

## 2024-12-31 NOTE — TELEPHONE ENCOUNTER
[employer]    ID#[id number]    [facility] [id number]  PCN  [id number]    Grp  [id number]

## 2025-01-02 NOTE — TELEPHONE ENCOUNTER
Repatha Prior authorization started-- sent to plan along with supporting documentation.    Outcome  Approved today by [facility] Medicare 2017  CaseId:[id number];Status:Approved;Review Type:Prior Auth;Coverage Start Date:01/01/2025;Coverage End Date:01/02/2026;  Authorization Expiration Date: 1/1/2026

## 2025-01-04 LAB
CHOLEST SERPL-MCNC: 251 MG/DL (ref 100–199)
HDLC SERPL-MCNC: 88 MG/DL
LDLC SERPL CALC-MCNC: 154 MG/DL (ref 0–99)
TRIGL SERPL-MCNC: 55 MG/DL (ref 0–149)
VLDLC SERPL CALC-MCNC: 9 MG/DL (ref 5–40)

## 2025-01-08 ENCOUNTER — TELEPHONE (OUTPATIENT)
Dept: CARDIOLOGY | Facility: CLINIC | Age: 75
End: 2025-01-08
Payer: MEDICARE

## 2025-01-08 ENCOUNTER — TELEMEDICINE (OUTPATIENT)
Dept: CARDIOLOGY | Facility: CLINIC | Age: 75
End: 2025-01-08
Payer: MEDICARE

## 2025-01-08 DIAGNOSIS — E78.00 HYPERCHOLESTEROLEMIA: Primary | ICD-10-CM

## 2025-01-08 DIAGNOSIS — I35.0 AORTIC VALVE STENOSIS, ETIOLOGY OF CARDIAC VALVE DISEASE UNSPECIFIED: ICD-10-CM

## 2025-01-08 DIAGNOSIS — I65.29 CAROTID ATHEROSCLEROSIS, UNSPECIFIED LATERALITY: ICD-10-CM

## 2025-01-08 DIAGNOSIS — E78.41 ELEVATED LP(A): ICD-10-CM

## 2025-01-08 PROCEDURE — 99214 OFFICE O/P EST MOD 30 MIN: CPT | Mod: 95 | Performed by: INTERNAL MEDICINE

## 2025-01-08 RX ORDER — ROSUVASTATIN CALCIUM 5 MG/1
5 TABLET, COATED ORAL SEE ADMIN INSTRUCTIONS
Start: 2025-01-08 | End: 2025-03-24

## 2025-01-08 NOTE — PROGRESS NOTES
Ebenezer Torres MD, [facility]  Cardiology    [facility]    [facility]  The [facility] [address]  [address] [place] [address]    TEL  [phone]  [facility]     01/08/25    Verification of Patient Location:  The patient affirms they are currently located in the following state: [place]    Are you in your home or a private residence? Yes    Request for Consent:    Audio and Video Encounter   Odilia, my name is Ebenezer Torres MD.  Before we proceed, can you please verify your identification by telling me your full name and date of birth?  Can you tell me who is in the room with you?    You and I are about to have a telemedicine check-in or visit because you have requested it.  This is a live video-conference.  I am a real person, speaking to you in real time.  There is no one else with me on the video-conference. I am not recording this conversation and I am asking you not to record it.  This telemedicine visit will be billed to your health insurance or you, if you are self-insured.  You understand you will be responsible for any copayments or coinsurances that apply to your telemedicine visit.  Communication platform used for this encounter:  [employer]ine Video Visit (Epic Video Client)       Before starting our telemedicine visit, I am required to get your consent for this virtual check-in or visit by telemedicine. Do you consent?    Patient Response to Request for Consent:  Yes      Dear Dr. Deacon Hair:    It was my pleasure to see Ms. Kelly Meeks at the [facility] today.      As you know, this is a 74-year-old woman with a history of polygenic hypercholesterolemia, elevated LP(a), statin intolerance, aortic stenosis, carotid atherosclerosis, thoracic aortic calcification, family history of premature coronary artery disease and supraventricular tachycardia.      Kelly restarted rosuvastatin 5 mg, 4 days/week after her recent lab draw.  She  does not notice any change in her musculoskeletal symptoms on versus off of the medication.  She continues to take ezetimibe 3 days/week along with Repatha.  Overall she feels well on this regimen.  She has no consistent musculoskeletal symptoms.  No new cardiopulmonary symptoms at rest or with activity.  She has made improvements to her diet.    Past Medical History:  Past Medical History:   Diagnosis Date    Anxiety     Asthma     Coronary artery disease     Hyperlipidemia     Hypothyroidism      Past Surgical History:  Past Surgical History   Procedure Laterality Date    Adenoidectomy      Cardiac catheterization  1976    Tonsillectomy       Medications:  Current Outpatient Medications   Medication Sig Dispense Refill    rosuvastatin (CRESTOR) 5 mg tablet Take 1 tablet (5 mg total) by mouth See admin instr. 4 days/week      acetylcysteine (MUCOMYST) 200 mg/mL (20 %) nebulizer solution Take 4 mL by nebulization every 4 (four) hours.      albuterol HFA 90 mcg/actuation inhaler INHALE TWO (2) PUFFS BY MOUTH FOUR (4) (FOUR) TIMES A DAY AS NEEDED FOR WHEEZING. 8.5 each 0    alpha lipoic acid 200 mg capsule Take 1 tablet by mouth once daily. (Patient not taking: Reported on 12/5/2024)      ascorbic acid (VITAMIN C) 500 mg tablet Take 1,000 mg by mouth daily. (Patient not taking: Reported on 12/5/2024)      cetirizine (ZyrTEC) 10 mg tablet Take 1 tablet (10 mg total) by mouth nightly. (Patient taking differently: Take 10 mg by mouth nightly as needed.) 90 tablet 1    cholecalciferol, vitamin D3, (VITAMIN D3) 2,000 unit tablet Take 2,000 Units by mouth daily.      coenzyme Q10 (CO Q-10) 10 mg capsule Take 10 mg by mouth daily.      DOCOSAHEXANOIC ACID/EPA (FISH OIL ORAL) Take 3,000 mg by mouth daily.      evolocumab (REPATHA SURECLICK) 140 mg/mL pen Inject 1 mL (140 mg total) under the skin every 14 (fourteen) days. 6 mL 3    ezetimibe (ZETIA) 10 mg tablet Take 1 tablet (10 mg total) by mouth daily. 90 tablet 1     levothyroxine (SYNTHROID) 100 mcg tablet TAKE ONE TABLET BY MOUTH ONCE DAILY 90 tablet 3    melatonin 5 mg capsule Take 13 mg by mouth nightly.      montelukast (SINGULAIR) 10 mg tablet TAKE ONE (1) TABLET (10 MG TOTAL) BY MOUTH NIGHTLY. 90 tablet 3    omeprazole (PriLOSEC) 40 mg capsule Take 1 capsule (40 mg total) by mouth daily before breakfast. 90 capsule 1    sertraline (ZOLOFT) 50 mg tablet Take 1 tablet (50 mg total) by mouth daily. 90 tablet 3    verapamiL (CALAN) 40 mg tablet TAKE ONE TABLET BY MOUTH ONCE DAILY WHEN NEEDED 90 tablet 3    verapamil SR (CALAN-SR) 240 mg CR tablet Take 1 tablet (240 mg total) by mouth daily. 90 tablet 3     No current facility-administered medications for this visit.       Allergies: Epinephrine and Procaine    Social History: The patient lives with her daughter.  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  Occasional glass of wine.  She quit smoking 15 years ago.    Family History: Father had multiple myocardial infarctions in his 30's.  Mother with possible coronary artery disease and stroke.  Brother with hypertension.    Review of Systems: A complete 14-point review of systems is negative, except as noted in the HPI.    Exam: N/A given telemedicine visit.  Below is her exam from her last in person visit.  Objective   There were no vitals filed for this visit.    There is no height or weight on file to calculate BMI.  Wt Readings from Last 3 Encounters:   12/11/24 67.6 kg (149 lb)   12/05/24 67.7 kg (149 lb 3.2 oz)   12/05/24 65.8 kg (145 lb)     Constitutional: Appears comfortable.   Eyes: No icterus.   ENT: Deferred.   Neck: No jugular venous distention.   Vascular: Carotid bruits versus radiated heart sounds.  Cardiac: Normal S1 and S2, regular rhythm.  There is a 2/6 systolic murmur at the base.  Lungs: Clear to auscultation bilaterally.    GI: Nondistended.  Extremities: Warm. No lower extremity edema.   Skin:  Dry.  Neurologic: Awake, alert, oriented.    Psychiatric: No agitation.    Labs: Personally reviewed and discussed with the patient.  Notable for the following.   Lab Results   Component Value Date    LDLCALC 154 (H) 01/03/2025    LDLCALC 119 (H) 06/27/2023    CHOL 251 (H) 01/03/2025    CHOL 235 (H) 06/27/2023    TRIG 55 01/03/2025    TRIG 69 06/27/2023    HDL 88 01/03/2025     06/27/2023     08/26/2024     06/27/2023    K 4.5 08/26/2024    K 4.1 06/27/2023    BUN 15 08/26/2024    BUN 16 06/27/2023    CREATININE 0.74 08/26/2024    CREATININE 0.8 06/27/2023    WBC 3.96 05/27/2022    HGB 13.9 05/27/2022     05/27/2022     Lab Results   Component Value Date    ALT 24 08/26/2024    AST 22 08/26/2024    ALKPHOS 68 08/26/2024    BILITOT 0.3 08/26/2024     LP(a) 205 nmol/L    Cardiovascular Studies:   Coronary calcium score, 6/2019: Composite 0.  Aorta unremarkable.  Carotid ultrasound, 2019: Mild plaque bilaterally  Stress echo, 4/2021: No ischemia.  TTE, 6/2024: Normal LV size, wall thickness, LVEF 65 to 70%.  No regional abnormalities.  Normal RV size/function.  Normal LA size.  Mild to moderate aortic stenosis (2.7/15/1.3).  Mild MR.  Mild TR.  Zio, 9/2024: Sinus rhythm, average 78 bpm.  6 beats of NSVT.  1 SVT run of 9 beats.  Symptoms associated with PVCs.  Carotid ultrasound, 12/2024: Mild to moderate plaque with less than 50% stenosis.      Assessment/Plan     Hypercholesterolemia/relative statin intolerance/elevated LP(a):  Her initial lipid profile revealed an LDL of 146, total cholesterol 228, HDL 62, triglycerides 102.  Her LP(a) was found to be 420 nmol/L.  She has a history of relative statin intolerance.  On Repatha, rosuvastatin, and ezetimibe her LDL was well-controlled.  With this discontinuation of rosuvastatin her numbers increased.  She is now back on rosuvastatin and she has no consistent musculoskeletal symptoms.  No muscle weakness.  Following a shared decision-making  conversation we decided to hold on any changes to her regimen at this time.  Will repeat a lipid profile in approximately 1 month.  Hopefully, her LDL returns to her prior baseline.  She will contact me with any new musculoskeletal symptoms.  We discussed the limited data for niacin and we decided to discontinue this medication today.  She continues to work on therapeutic lifestyle change.  I previously recommended that all first-degree family members have screening LP(a) levels checked.    Carotid atherosclerosis:  Mild disease on a recent carotid ultrasound.  Lipid-lowering as below.  We will discuss a surveillance ultrasound in the coming years.    Aortic stenosis:  Mild to moderate by echocardiography earlier this year.  Preserved biventricular systolic function.  Will plan on a repeat echocardiogram in 1-2 years.    Supraventricular tachycardia/palpitations:  She has felt well.  I made no changes to her medication.  I previously spoke to Dr. Muse about her episode of NSVT and no workup was recommended.  She continues to follow with EP.    Family history of premature coronary artery disease:  This history was reviewed.  She has an elevated LP(a).  She has no anginal symptoms.    Elevated blood pressure reading:  Blood pressure not checked, as this was a telemedicine visit.  Will continue to monitor.        It was my pleasure to visit with Kelly Meeks in clinic today. Please do not hesitate to contact me with any questions.        Sincerely,       ________________  Ebenezer Torres MD    I spent 25 minutes on this date of service performing the following activities: obtaining history, entering orders, documenting, preparing for visit, obtaining / reviewing records, providing counseling and education, and communicating results.

## 2025-01-08 NOTE — TELEPHONE ENCOUNTER
Please call patient and offer an inperson or telemedicine visit today at 10:00 AM. If she cannot do this time, offer 12:40 PM telemedicine to discuss her cholesterol medication management.  Please ensure she is in [place] and could do a video telemedicine visit. Thanks.

## 2025-01-17 ENCOUNTER — OFFICE VISIT (OUTPATIENT)
Dept: FAMILY MEDICINE | Facility: CLINIC | Age: 75
End: 2025-01-17
Payer: MEDICARE

## 2025-01-17 VITALS
SYSTOLIC BLOOD PRESSURE: 132 MMHG | DIASTOLIC BLOOD PRESSURE: 74 MMHG | BODY MASS INDEX: 26.96 KG/M2 | HEART RATE: 68 BPM | OXYGEN SATURATION: 95 % | TEMPERATURE: 97.6 F | WEIGHT: 147.4 LBS | RESPIRATION RATE: 16 BRPM

## 2025-01-17 DIAGNOSIS — F41.9 ANXIETY: Primary | ICD-10-CM

## 2025-01-17 DIAGNOSIS — M06.4 INFLAMMATORY POLYARTHROPATHY (CMS/HCC): ICD-10-CM

## 2025-01-17 PROCEDURE — 99214 OFFICE O/P EST MOD 30 MIN: CPT | Performed by: SURGERY

## 2025-01-17 RX ORDER — SERTRALINE HYDROCHLORIDE 25 MG/1
25 TABLET, FILM COATED ORAL DAILY
Qty: 90 TABLET | Refills: 3 | Status: SHIPPED | OUTPATIENT
Start: 2025-01-17 | End: 2026-01-17

## 2025-01-17 ASSESSMENT — ENCOUNTER SYMPTOMS
SLEEP DISTURBANCE: 1
DYSPHORIC MOOD: 0
NERVOUS/ANXIOUS: 0

## 2025-01-17 NOTE — ASSESSMENT & PLAN NOTE
We will increase her sertraline dose to 75 mg daily. Mood is improving. Sleep is improving, but still having early morning wake-ups. She will notify me in two weeks with how she is doing. If no notable improvement, will increase her dose to 100 mg daily.

## 2025-01-17 NOTE — PROGRESS NOTES
Subjective      Patient ID: Kelly Meeks is a 74 y.o. female.    HPI    Med Check  Last visit 12/5/25.  -That visit, opted to trial off omeprazole to see if symptoms return. She had completed a 30 day trial of Omeprazole 40 mg with complete symptom resolution. Discussed can use famotidine PRN.  She has been using the omeprazole every other day. Without taking it at this frequency, heart burn returns and can be significant.    -Increased her sertraline to 50 mg last visit for anxetiy and poor sleep. Referred to Psychology.  She is feeling calmer. Still situational stress.  She is now able to sleep until 5:00 AM. Wakes and may fall back to sleep until 8/8:30 AM.      -Repatha, rosuvastatin 4 days weekly, Zetia for her high cholesterol and elevated LPa. Followed by Dr. Torres. Planned to repeat lipid profile in 1 month.          Review of Systems   Psychiatric/Behavioral:  Positive for sleep disturbance. Negative for dysphoric mood. The patient is not nervous/anxious.        Objective     Physical Exam  Vitals reviewed.   Constitutional:       General: She is not in acute distress.     Appearance: Normal appearance.   Cardiovascular:      Rate and Rhythm: Normal rate.   Neurological:      General: No focal deficit present.      Mental Status: She is alert and oriented to person, place, and time. Mental status is at baseline.   Psychiatric:         Mood and Affect: Mood normal.         Behavior: Behavior normal.         Thought Content: Thought content normal.         Judgment: Judgment normal.         Assessment/Plan   Problem List Items Addressed This Visit       Anxiety - Primary     We will increase her sertraline dose to 75 mg daily. Mood is improving. Sleep is improving, but still having early morning wake-ups. She will notify me in two weeks with how she is doing. If no notable improvement, will increase her dose to 100 mg daily.         Relevant Medications    sertraline (ZOLOFT) 25 mg tablet    Inflammatory  polyarthropathy (CMS/HCC)       Kylee Hair MD  1/17/2025

## 2025-01-19 SDOH — ECONOMIC STABILITY: FOOD INSECURITY: WITHIN THE PAST 12 MONTHS, YOU WORRIED THAT YOUR FOOD WOULD RUN OUT BEFORE YOU GOT MONEY TO BUY MORE.: NEVER TRUE

## 2025-01-19 SDOH — ECONOMIC STABILITY: FOOD INSECURITY: WITHIN THE PAST 12 MONTHS, THE FOOD YOU BOUGHT JUST DIDN'T LAST AND YOU DIDN'T HAVE MONEY TO GET MORE.: NEVER TRUE

## 2025-01-19 SDOH — ECONOMIC STABILITY: TRANSPORTATION INSECURITY
IN THE PAST 12 MONTHS, HAS LACK OF TRANSPORTATION KEPT YOU FROM MEETINGS, WORK, OR FROM GETTING THINGS NEEDED FOR DAILY LIVING?: NO

## 2025-01-19 SDOH — ECONOMIC STABILITY: INCOME INSECURITY: IN THE LAST 12 MONTHS, WAS THERE A TIME WHEN YOU WERE NOT ABLE TO PAY THE MORTGAGE OR RENT ON TIME?: NO

## 2025-01-19 SDOH — ECONOMIC STABILITY: TRANSPORTATION INSECURITY
IN THE PAST 12 MONTHS, HAS THE LACK OF TRANSPORTATION KEPT YOU FROM MEDICAL APPOINTMENTS OR FROM GETTING MEDICATIONS?: NO

## 2025-01-19 ASSESSMENT — SOCIAL DETERMINANTS OF HEALTH (SDOH): IN THE PAST 12 MONTHS, HAS THE ELECTRIC, GAS, OIL, OR WATER COMPANY THREATENED TO SHUT OFF SERVICE IN YOUR HOME?: NO

## 2025-01-21 ENCOUNTER — OFFICE VISIT (OUTPATIENT)
Dept: INTERNAL MEDICINE | Facility: CLINIC | Age: 75
End: 2025-01-21
Payer: MEDICARE

## 2025-01-21 ENCOUNTER — TELEPHONE (OUTPATIENT)
Dept: SCHEDULING | Facility: CLINIC | Age: 75
End: 2025-01-21
Payer: MEDICARE

## 2025-01-21 VITALS
HEIGHT: 62 IN | OXYGEN SATURATION: 95 % | BODY MASS INDEX: 26.87 KG/M2 | WEIGHT: 146 LBS | SYSTOLIC BLOOD PRESSURE: 120 MMHG | DIASTOLIC BLOOD PRESSURE: 80 MMHG | RESPIRATION RATE: 18 BRPM | HEART RATE: 70 BPM

## 2025-01-21 DIAGNOSIS — I47.10 SUPRAVENTRICULAR TACHYCARDIA (CMS/HCC): ICD-10-CM

## 2025-01-21 DIAGNOSIS — E78.2 MIXED HYPERLIPIDEMIA: Primary | ICD-10-CM

## 2025-01-21 DIAGNOSIS — E03.9 ACQUIRED HYPOTHYROIDISM: ICD-10-CM

## 2025-01-21 DIAGNOSIS — E78.41 ELEVATED LP(A): ICD-10-CM

## 2025-01-21 DIAGNOSIS — I10 PRIMARY HYPERTENSION: ICD-10-CM

## 2025-01-21 DIAGNOSIS — G47.9 SLEEP DISTURBANCE: ICD-10-CM

## 2025-01-21 DIAGNOSIS — F41.9 ANXIETY: ICD-10-CM

## 2025-01-21 PROCEDURE — G8754 DIAS BP LESS 90: HCPCS | Performed by: INTERNAL MEDICINE

## 2025-01-21 PROCEDURE — 99215 OFFICE O/P EST HI 40 MIN: CPT | Performed by: INTERNAL MEDICINE

## 2025-01-21 PROCEDURE — G8752 SYS BP LESS 140: HCPCS | Performed by: INTERNAL MEDICINE

## 2025-01-21 ASSESSMENT — PATIENT HEALTH QUESTIONNAIRE - PHQ9: SUM OF ALL RESPONSES TO PHQ9 QUESTIONS 1 & 2: 0

## 2025-01-21 NOTE — PROGRESS NOTES
Patient ID: Kelly Meeks, : 1950 is a 74 y.o. female who presents for Establish Care    Consent obtained from patient and all parties present in the room? yes  Attestation    Subjective     History of Present Illness  The patient presents for a first visit checkup.    She experienced an unusual episode of right leg pain on Saturday afternoon, which was severe enough to impede her walking. The pain was deep-seated, without any visible signs such as bruising, swelling, or bumps. It persisted into the night, prompting her to apply heat initially, which proved ineffective. However, the application of ice packs seemed to alleviate the discomfort. The pain, described as dull, subsided by midday the following day. She reports no muscle spasms in the calf. Concerned about a potential blood clot, she took a baby aspirin. The next day, she developed right arm pain, which also resolved after a day.    She has a history of supraventricular tachycardia (SVT), with no recent attacks. She is under the care of Dr. Edil Sandhu, who performed an ablation procedure on her. She has an upcoming appointment with him in 2025. She was previously followed by Dr. Muse for several years. She reports experiencing heavy breathing and the need to rest when performing strenuous activities such as taking out the trash. She recalls an incident last summer where she felt fluttery while walking her dog, suspecting a minor SVT episode. A Holter monitor confirmed occasional SVT episodes. She also mentions a particularly hot day when she felt unwell during a walk, necessitating rest and immediate bed rest upon returning home. She has had a stress test done in the past and has had a couple of calcium scores of zero. She reports narrowing of her carotid arteries and expresses concern about her cholesterol levels. She is currently on verapamil 240 mg at night daily and an additional 40 mg if she experiences breakthrough symptoms.    She  "has been experiencing fluctuating cholesterol levels, including LDL, Lp (a), and total cholesterol. She started Repatha therapy in 09/2024, which resulted in significant improvement in her blood test results. She is also on statin and Zetia. She plans to have more blood work done in about 2 weeks to monitor her cholesterol levels.    She recently increased her sertraline dosage to 75 mg due to high levels of anxiety and stress. She reports feeling better overall but continues to struggle with sleep issues. Dr. Bunn suggested increasing the sertraline dose to 100 mg to address her sleep problems. She reports difficulty staying asleep, often waking up between 2:00 AM and 4:00 AM and remaining awake for at least an hour and a half before falling back asleep. She practices deep breathing exercises to aid sleep. She typically wakes up at 8:30 AM. She uses a magnesium foam on her legs to manage cramping, a side effect of statin use, but does not take oral magnesium supplements. She reports that the cramping persisted even after discontinuing the statin for a few weeks.    She is on thyroid replacement therapy for hypothyroidism.    She has been experiencing issues with her left ring finger locking up. She has received 2 steroid [facility].    Supplemental Information  She has a heart murmur since she was 4 years old.    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 She is  and has 1 daughter who is 32 years old and pregnant.  Daughter recently moved in with the patient    MEDICATIONS  Current: Verapamil, sertraline, Repatha, Zetia    The following have been reviewed and updated as appropriate in this visit:  Past medical history, Social History, Family History, Medications, Allergies     Physical exam:  Visit Vitals  /80 (BP Location: Left upper arm, Patient Position: Sitting)   Pulse 70   Resp 18   Ht 1.575 m (5' 2\")   Wt 66.2 " kg (146 lb)   SpO2 95%   BMI 26.70 kg/m²     Physical Exam  No carotid bruits detected in the neck.  Lungs are clear. No crackling or wheezing sounds heard.  Heart rhythm is regular. No murmurs detected.  Scattered papules noted on the skin, appearing more brown than red.    Vital Signs  Blood pressure reading is 120/80.  Wt Readings from Last 10 Encounters:   01/21/25 66.2 kg (146 lb)   01/17/25 66.9 kg (147 lb 6.4 oz)   12/11/24 67.6 kg (149 lb)   12/05/24 67.7 kg (149 lb 3.2 oz)   12/05/24 65.8 kg (145 lb)   11/06/24 66.2 kg (146 lb)   08/29/24 64.2 kg (141 lb 9.6 oz)   06/27/24 66.7 kg (147 lb)   05/09/24 66.7 kg (147 lb)   04/30/24 67.5 kg (148 lb 12.8 oz)       BP Readings from Last 3 Encounters:   01/21/25 120/80   01/17/25 132/74   12/05/24 139/80      data:    Below is the patient's most recent value for Albumin, ALT, AST, BUN, Calcium, Chloride, Cholesterol, CO2, Creatinine, GFR, Glucose, HDL, Hematocrit, Hemoglobin, Hemoglobin A1C, LDL, Magnesium, Phosphorus, Platelets, Potassium, PSA, Sodium, Triglycerides, and WBC.   Lab Results   Component Value Date    ALBUMIN 4.0 08/26/2024    ALT 24 08/26/2024    AST 22 08/26/2024    BUN 15 08/26/2024    CALCIUM 9.1 06/27/2023     (H) 08/26/2024    CHOL 251 (H) 01/03/2025    CO2 26 08/26/2024    CREATININE 0.74 08/26/2024    HDL 88 01/03/2025    HCT 41.7 05/27/2022    HGB 13.9 05/27/2022     05/27/2022    K 4.5 08/26/2024     08/26/2024    TRIG 55 01/03/2025    WBC 3.96 05/27/2022       Lab Results   Component Value Date    TSH 1.170 12/28/2023    TSH 0.51 09/28/2023       Lab Results   Component Value Date    LDLCALC 154 (H) 01/03/2025    LDLCALC 64 11/20/2024    CREATININE 0.74 08/26/2024    CREATININE 0.89 12/28/2023    CREATININE 0.8 06/27/2023    CREATININE 0.8 05/27/2022    CREATININE 0.8 10/17/2019      Lab Results   Component Value Date    VITD25 43 05/27/2022      Results  Laboratory Studies  TSH was normal in December 2023. Kidney  function and electrolytes were normal in August 2024. Lp (a) is very high.    Imaging  Echocardiogram in June 2024 showed good ejection fraction and no major valvular abnormalities.    Assessment & Plan  1. Right leg pain.  The symptoms suggest a neurological origin, potentially a pinched nerve either peripherally or centrally in the spine. The spontaneous resolution of the pain is a positive sign. No further investigation is warranted at this point unless there is a recurrence of the symptoms.    2. Right arm pain.  Similar to the leg pain, this appears to be neurologically based, likely due to a pinched nerve. The pain resolved on its own. No further action is required unless symptoms recur.    3. Supraventricular tachycardia (SVT).  She is currently on verapamil 240 mg at night and 40 mg for breakthrough symptoms, which has been effective. She has not had an attack in a long time. She has a scheduled appointment with Dr. Edil Sandhu in February 2025. A discussion with Dr. Torres regarding the necessity of a repeat stress test or CT angiography will be initiated. A repeat CT calcium score will also be considered.    4. Cholesterol management.  She is on Repatha, Zetia, and a statin. Her cholesterol levels have been fluctuating. She will have more blood work done in about 2 weeks to assess the effectiveness of the current regimen.    5. Anxiety and stress./Sleep disturbance insomnia  She is currently on sertraline 75 mg, which has helped with anxiety but not with sleep issues. Dr. Bunn suggested increasing the dose to 100 mg if needed. She is advised to get out of bed and engage in relaxing activities if she wakes up during the night.  Reduction of exposure to bluelight in the middle of the night and a magnesium supplement (200 mg of magnesium oxide) is recommended to help facilitate healthy sleep.    6. Hypothyroidism.  She is on thyroid replacement therapy. Her last TSH was checked in December 2023. A TSH level  test will be ordered to be done at [facility] at her convenience.    7. Trigger finger.  She has received 2 steroid shots for her left ring finger and is seeing a hand specialist at [facility]. Surgery may be considered if symptoms persist.    Follow-up  The patient will follow up in 6 months, or earlier if necessary.        On date of service I spent 42 minutes on the following activities  Preparing for visit  Obtaining/reviewing records  Obtaining history/performing examination  Providing counseling and education  Independently reviewing studies  Communicating results  Coordinating care  Documenting  Ordering tests/medications/procedures

## 2025-01-21 NOTE — TELEPHONE ENCOUNTER
Miladis from [employer] clinical appeals dept is requesting a call back. Pt has filed an appeal for a tier exception for her Repatha and Miladis needs to retrieve some clinical information from the office.     Miladis can be reached at [phone] ref # [id number]

## 2025-02-11 ENCOUNTER — OFFICE VISIT (OUTPATIENT)
Dept: CARDIOLOGY | Facility: CLINIC | Age: 75
End: 2025-02-11
Payer: MEDICARE

## 2025-02-11 VITALS
SYSTOLIC BLOOD PRESSURE: 126 MMHG | WEIGHT: 150 LBS | BODY MASS INDEX: 27.6 KG/M2 | HEIGHT: 62 IN | HEART RATE: 68 BPM | DIASTOLIC BLOOD PRESSURE: 70 MMHG

## 2025-02-11 DIAGNOSIS — I35.0 AORTIC VALVE STENOSIS, ETIOLOGY OF CARDIAC VALVE DISEASE UNSPECIFIED: ICD-10-CM

## 2025-02-11 DIAGNOSIS — R00.2 PALPITATION: Primary | ICD-10-CM

## 2025-02-11 DIAGNOSIS — I47.10 SUPRAVENTRICULAR TACHYCARDIA (CMS/HCC): ICD-10-CM

## 2025-02-11 PROCEDURE — 99214 OFFICE O/P EST MOD 30 MIN: CPT | Performed by: INTERNAL MEDICINE

## 2025-02-11 PROCEDURE — 93000 ELECTROCARDIOGRAM COMPLETE: CPT | Performed by: INTERNAL MEDICINE

## 2025-02-11 ASSESSMENT — ENCOUNTER SYMPTOMS: CONSTITUTIONAL NEGATIVE: 1

## 2025-02-11 NOTE — ASSESSMENT & PLAN NOTE
She has a history of palpitations and SVT.  She has undergone multiple outpatient monitors which showed short runs of SVT but also APD's and VPD's.  Palpitations are worse in the fall.  She will use as needed verapamil in addition to her daily dose during those months.

## 2025-02-11 NOTE — ASSESSMENT & PLAN NOTE
She has a history of SVT for many years.  She is currently on verapamil 240 mg daily.  She will have palpitations (mostly during the fall) and will take verapamil 40 mg as needed during those months.  For now, she is happy with her regimen. We did discuss if she has recurrent SVT, ablation remains a reasonable treatment option.

## 2025-02-11 NOTE — PROGRESS NOTES
Electrophysiology  Outpatient Progress Note       Reason for visit: Follow-up    HPI   Kelly Meeks is a 74 y.o. female who is seen today for evaluation of SVT. She was previously followed by Dr. Muse.  She also carries a history of dyslipidemia and aortic stenosis.     She has a history of SVT and is currently on verapamil.  She noted an increase in palpitations and underwent Zio patch in October 2024.  This revealed a 9 beat run of SVT and occasional VPD's and APD's.  Prior to that she underwent Zio patch in September 2023.    She has been feeling well.  She reports her palpitations are worse in the fall and she will take her as needed verapamil.  These episodes are brief but can occur almost daily and can be brought on by activity.  She reports not having any abrupt onset palpitations in many years.  She denies chest pain, lightheadedness, dizziness, shortness breath, or syncope.    Past Medical History:   Diagnosis Date    Anxiety     Asthma     Coronary artery disease     Hyperlipidemia     Hypothyroidism      Past Surgical History   Procedure Laterality Date    Adenoidectomy      Cardiac catheterization  1976    Tonsillectomy         Social History     Tobacco Use    Smoking status: Former    Smokeless tobacco: Never    Tobacco comments:     stopped @ age 31   Vaping Use    Vaping status: Never Used   Substance Use Topics    Alcohol use: Yes     Comment: daily    Drug use: No     Family History   Problem Relation Name Age of Onset    Heart attack Biological Mother      Stroke Biological Mother      Heart attack Biological Father         ALLERGY:  Epinephrine and Procaine    Current Outpatient Medications   Medication Sig Dispense Refill    acetylcysteine (MUCOMYST) 200 mg/mL (20 %) nebulizer solution Take 4 mL by nebulization every 4 (four) hours.      albuterol HFA 90 mcg/actuation inhaler INHALE TWO (2) PUFFS BY MOUTH FOUR (4) (FOUR) TIMES A DAY AS NEEDED FOR WHEEZING. 8.5 each 0    cetirizine  (ZyrTEC) 10 mg tablet Take 1 tablet (10 mg total) by mouth nightly. (Patient taking differently: Take 10 mg by mouth nightly as needed.) 90 tablet 1    cholecalciferol, vitamin D3, (VITAMIN D3) 2,000 unit tablet Take 2,000 Units by mouth daily. (Patient taking differently: Take 1,000 Units by mouth daily.)      coenzyme Q10 (CO Q-10) 10 mg capsule Take 10 mg by mouth daily.      DOCOSAHEXANOIC ACID/EPA (FISH OIL ORAL) Take 3,000 mg by mouth daily. (Patient taking differently: Take 1,500 mg by mouth daily.)      evolocumab (REPATHA SURECLICK) 140 mg/mL pen Inject 1 mL (140 mg total) under the skin every 14 (fourteen) days. 6 mL 3    ezetimibe (ZETIA) 10 mg tablet Take 1 tablet (10 mg total) by mouth daily. 90 tablet 1    levothyroxine (SYNTHROID) 100 mcg tablet TAKE ONE TABLET BY MOUTH ONCE DAILY 90 tablet 3    melatonin 5 mg capsule Take 13 mg by mouth nightly. (Patient taking differently: Take 10 mg by mouth nightly.)      montelukast (SINGULAIR) 10 mg tablet TAKE ONE (1) TABLET (10 MG TOTAL) BY MOUTH NIGHTLY. 90 tablet 3    omeprazole (PriLOSEC) 40 mg capsule Take 1 capsule (40 mg total) by mouth daily before breakfast. 90 capsule 1    rosuvastatin (CRESTOR) 5 mg tablet Take 1 tablet (5 mg total) by mouth See admin instr. 4 days/week      sertraline (ZOLOFT) 25 mg tablet Take 1 tablet (25 mg total) by mouth daily. Take with your 50 mg tablet for a total dose of 75 mg daily. 90 tablet 3    sertraline (ZOLOFT) 50 mg tablet Take 1 tablet (50 mg total) by mouth daily. 90 tablet 3    verapamiL (CALAN) 40 mg tablet TAKE ONE TABLET BY MOUTH ONCE DAILY WHEN NEEDED 90 tablet 3    verapamil SR (CALAN-SR) 240 mg CR tablet Take 1 tablet (240 mg total) by mouth daily. 90 tablet 3    zoledronic acid/mannitol-water (RECLAST IV) Infuse into a venous catheter.       No current facility-administered medications for this visit.       Review of Systems   Constitutional: Negative.       Objective   Vitals:    02/11/25 1555   BP:  126/70   Pulse: 68       Wt Readings from Last 3 Encounters:   02/11/25 68 kg (150 lb)   01/21/25 66.2 kg (146 lb)   01/17/25 66.9 kg (147 lb 6.4 oz)       BP Readings from Last 3 Encounters:   02/11/25 126/70   01/21/25 120/80   01/17/25 132/74       Physical Exam  Cardiovascular:      Rate and Rhythm: Normal rate and regular rhythm.      Heart sounds: Murmur heard.   Pulmonary:      Effort: Pulmonary effort is normal. No respiratory distress.      Breath sounds: Normal breath sounds.   Musculoskeletal:      Right lower leg: No edema.      Left lower leg: No edema.   Skin:     General: Skin is warm and dry.   Neurological:      Mental Status: She is alert and oriented to person, place, and time.         Lab Results   Component Value Date    WBC 3.96 05/27/2022    HGB 13.9 05/27/2022     05/27/2022    CHOL 251 (H) 01/03/2025    TRIG 55 01/03/2025    HDL 88 01/03/2025    ALT 24 08/26/2024    AST 22 08/26/2024     08/26/2024    K 4.5 08/26/2024    CREATININE 0.74 08/26/2024    TSH 1.170 12/28/2023     Echocardiogram/stress test  6/27/2024    Left Ventricle: Normal ventricle size. Normal wall thickness. Preserved systolic function. Estimated EF 65-70%. No regional wall motion abnormalities. Indeterminate diastolic filling pattern.    Aorta: Aortic root sclerotic.    Aortic Valve: Valve not well seen but likely trileaflet. No regurgitation. Mild to moderate stenosis. Peak velocity = 2.74 m/s. Mean gradient = 15.00 mmHg. Calculated area by cont eq = 1.26 cm2. Calculated dimensionless index = 0.40.    Tricuspid Valve: Mild regurgitation. Estimated RVSP = 36 mmHg.    Pulmonic Valve: Trace regurgitation.    Right Ventricle: Normal ventricle size. Normal systolic function.    Left Atrium: Normal sized atrium.    Right Atrium: Normal sized atrium.    Mitral Valve: Mild regurgitation.    IVC/SVC: Inferior vena cava is dilated (>2.1cm). Inferior vena cava collapses >50% during inspiration suggesting mildly elevated  central venous filling pressures..    Pericardium: No evidence of pericardial effusion.    Compared with June 2023, no significant change.     Zio October 2023      Zio September 2024      ECG   Sinus rhythm    Assessment   Problem List Items Addressed This Visit          Circulatory    Supraventricular tachycardia (CMS/HCC)     She has a history of SVT for many years.  She is currently on verapamil 240 mg daily.  She will have palpitations (mostly during the fall) and will take verapamil 40 mg as needed during those months.  For now, she is happy with her regimen. We did discuss if she has recurrent SVT, ablation remains a reasonable treatment option.          Palpitation - Primary     She has a history of palpitations and SVT.  She has undergone multiple outpatient monitors which showed short runs of SVT but also APD's and VPD's.  Palpitations are worse in the fall.  She will use as needed verapamil in addition to her daily dose during those months.         Relevant Orders    [facility] MUSE ECG 12 lead (clinic performed) (Completed)    Aortic stenosis     Her most recent echocardiogram in June 2024 with mild to moderate aortic stenosis.                 Lionel Sandhu MD  2/22/2025

## 2025-02-12 ENCOUNTER — TELEPHONE (OUTPATIENT)
Dept: FAMILY MEDICINE | Facility: CLINIC | Age: 75
End: 2025-02-12
Payer: MEDICARE

## 2025-02-12 NOTE — TELEPHONE ENCOUNTER
Patient called stating that she is taking Zoloft and her dose was increased and now her hair has started thinning and falling out and she needs a provider to return her call @ [phone]

## 2025-02-25 DIAGNOSIS — R07.81 PLEURITIC CHEST PAIN: Primary | ICD-10-CM

## 2025-02-28 ENCOUNTER — HOSPITAL ENCOUNTER (OUTPATIENT)
Dept: RADIOLOGY | Age: 75
Discharge: HOME | End: 2025-02-28
Attending: INTERNAL MEDICINE
Payer: MEDICARE

## 2025-02-28 DIAGNOSIS — R07.81 PLEURITIC CHEST PAIN: ICD-10-CM

## 2025-02-28 PROCEDURE — 71046 X-RAY EXAM CHEST 2 VIEWS: CPT

## 2025-03-01 LAB
ALBUMIN SERPL-MCNC: 4.4 G/DL (ref 3.8–4.8)
ALP SERPL-CCNC: 74 IU/L (ref 44–121)
ALT SERPL-CCNC: 27 IU/L (ref 0–32)
AST SERPL-CCNC: 29 IU/L (ref 0–40)
BILIRUB DIRECT SERPL-MCNC: 0.15 MG/DL (ref 0–0.4)
BILIRUB SERPL-MCNC: 0.2 MG/DL (ref 0–1.2)
CHOLEST SERPL-MCNC: 170 MG/DL (ref 100–199)
HDLC SERPL-MCNC: 86 MG/DL
LDLC SERPL CALC-MCNC: 73 MG/DL (ref 0–99)
PROT SERPL-MCNC: 6.7 G/DL (ref 6–8.5)
TRIGL SERPL-MCNC: 52 MG/DL (ref 0–149)
TSH SERPL DL<=0.005 MIU/L-ACNC: 1.08 UIU/ML (ref 0.45–4.5)
VLDLC SERPL CALC-MCNC: 11 MG/DL (ref 5–40)

## 2025-03-03 ENCOUNTER — TELEPHONE (OUTPATIENT)
Dept: INTERNAL MEDICINE | Facility: CLINIC | Age: 75
End: 2025-03-03
Payer: MEDICARE

## 2025-03-03 NOTE — TELEPHONE ENCOUNTER
[facility] Appointment Request   Provider: Dr. Bran  Appointment Type: OV/FUV  Reason for Visit: Needs blood work  [phone]    Pt states she received a message from provider and needed to schedule soon    The practice will reach out to schedule your appointment within the next 2 business days.

## 2025-03-05 ENCOUNTER — OFFICE VISIT (OUTPATIENT)
Dept: INTERNAL MEDICINE | Facility: CLINIC | Age: 75
End: 2025-03-05
Payer: MEDICARE

## 2025-03-05 ENCOUNTER — TELEPHONE (OUTPATIENT)
Dept: SCHEDULING | Facility: CLINIC | Age: 75
End: 2025-03-05
Payer: MEDICARE

## 2025-03-05 VITALS
DIASTOLIC BLOOD PRESSURE: 70 MMHG | HEIGHT: 62 IN | SYSTOLIC BLOOD PRESSURE: 120 MMHG | BODY MASS INDEX: 27.44 KG/M2 | HEART RATE: 75 BPM | RESPIRATION RATE: 18 BRPM | OXYGEN SATURATION: 95 %

## 2025-03-05 DIAGNOSIS — S29.8XXD BLUNT TRAUMA TO CHEST, SUBSEQUENT ENCOUNTER: Primary | ICD-10-CM

## 2025-03-05 DIAGNOSIS — J45.20 MILD INTERMITTENT ASTHMA WITHOUT COMPLICATION: ICD-10-CM

## 2025-03-05 DIAGNOSIS — Z78.9 CURRENT NON-SMOKER BUT PAST SMOKING HISTORY UNKNOWN: ICD-10-CM

## 2025-03-05 PROCEDURE — 99214 OFFICE O/P EST MOD 30 MIN: CPT | Performed by: NURSE PRACTITIONER

## 2025-03-05 NOTE — PROGRESS NOTES
Consent obtained from patient and all parties present in the room? yes    I have obtained the consent of everyone present in the room to make an audio recording of this visit to assist me in documenting the encounter in the EMR.     Subjective     Patient ID: Kelly Meeks, : 1950 is a 74 y.o. female who presents for Follow-up    History of Present Illness  The patient is a 74-year-old female who presents for evaluation of chest pain.    74 yr old female with recent trauma to the chest wall.  Reports on going pain to her right side chest, axilla, and breast.  Follow up chest x-ray with not fractures.  She experienced a forward fall on 2025, resulting in chest discomfort that persisted for several weeks. Initially, she reported soreness, which later evolved into pain. Approximately 1 to 2 weeks post-fall, she began experiencing a sensation akin to an injection, followed by a burning sensation. An x-ray conducted last Friday revealed no abnormalities, but she remains concerned about potential underlying issues. She reports no respiratory distress but notes pain under her right breast and shoulder blade during episodes of sharp pain. Two days ago, she woke up with significant soreness over her entire breast. The pain intensifies when she is supine and is less noticeable when she is ambulatory and engaged in daily activities. She reports no range of motion limitations in her arms but experiences varying degrees of pain on different days. She describes the pain as deep within her chest and not reproducible by palpation. She reports no abnormal bleeding, including vaginal or rectal bleeding. She also reports no bruising following the fall. She is not on aspirin or any anticoagulants but takes fish oil supplements. She reports no shortness of breath. She has been managing the pain with 2 Advil tablets daily, which she finds beneficial.    She has a history of linear scarring in her lung bases, rheumatic  "fever, and a heart murmur. In her 50s, she experienced a prolonged period of persistent coughing, which has since resolved. She has cold-induced asthma and uses albuterol. She does not recall using Mucomyst. She received an influenza vaccine 4 years ago, which was administered incorrectly, resulting in persistent soreness for 18 months in her left shoulder. The soreness recurs intermittently. She reports no history of rotator cuff injuries.    She has osteopenia and is on vitamin D and Reclast. She is also on Repatha.    SOCIAL HISTORY  The patient was a smoker in her late 20s and early 30s but stopped a long time ago.    MEDICATIONS  Current: Advil, vitamin D, Reclast, Repatha, albuterol, fish oil    IMMUNIZATIONS  She had a flu shot about 4 years ago.    The following have been reviewed and updated as appropriate in this visit:    Objective   Vitals:    03/05/25 1358   BP: 120/70   BP Location: Left upper arm   Patient Position: Sitting   Pulse: 75   Resp: 18   SpO2: 95%   Height: 1.575 m (5' 2\")     Body mass index is 27.44 kg/m².    BP Readings from Last 3 Encounters:   03/05/25 120/70   02/11/25 126/70   01/21/25 120/80     Wt Readings from Last 3 Encounters:   02/11/25 68 kg (150 lb)   01/21/25 66.2 kg (146 lb)   01/17/25 66.9 kg (147 lb 6.4 oz)     Past Medical History:   Diagnosis Date    Anxiety     Asthma     Coronary artery disease     Hyperlipidemia     Hypothyroidism        Current Outpatient Medications:     acetylcysteine (MUCOMYST) 200 mg/mL (20 %) nebulizer solution, Take 4 mL by nebulization every 4 (four) hours., Disp: , Rfl:     albuterol HFA 90 mcg/actuation inhaler, INHALE TWO (2) PUFFS BY MOUTH FOUR (4) (FOUR) TIMES A DAY AS NEEDED FOR WHEEZING., Disp: 8.5 each, Rfl: 0    cholecalciferol, vitamin D3, (VITAMIN D3) 2,000 unit tablet, Take 2,000 Units by mouth daily. (Patient taking differently: Take 1,000 Units by mouth daily.), Disp: , Rfl:     coenzyme Q10 (CO Q-10) 10 mg capsule, Take 10 mg " by mouth daily., Disp: , Rfl:     DOCOSAHEXANOIC ACID/EPA (FISH OIL ORAL), Take 3,000 mg by mouth daily. (Patient taking differently: Take 1,500 mg by mouth daily.), Disp: , Rfl:     evolocumab (REPATHA SURECLICK) 140 mg/mL pen, Inject 1 mL (140 mg total) under the skin every 14 (fourteen) days., Disp: 6 mL, Rfl: 3    ezetimibe (ZETIA) 10 mg tablet, Take 1 tablet (10 mg total) by mouth daily., Disp: 90 tablet, Rfl: 1    levothyroxine (SYNTHROID) 100 mcg tablet, TAKE ONE TABLET BY MOUTH ONCE DAILY, Disp: 90 tablet, Rfl: 3    melatonin 5 mg capsule, Take 13 mg by mouth nightly. (Patient taking differently: Take 10 mg by mouth nightly.), Disp: , Rfl:     montelukast (SINGULAIR) 10 mg tablet, TAKE ONE (1) TABLET (10 MG TOTAL) BY MOUTH NIGHTLY., Disp: 90 tablet, Rfl: 3    omeprazole (PriLOSEC) 40 mg capsule, Take 1 capsule (40 mg total) by mouth daily before breakfast., Disp: 90 capsule, Rfl: 1    rosuvastatin (CRESTOR) 5 mg tablet, Take 1 tablet (5 mg total) by mouth See admin instr. 4 days/week, Disp: , Rfl:     sertraline (ZOLOFT) 25 mg tablet, Take 1 tablet (25 mg total) by mouth daily. Take with your 50 mg tablet for a total dose of 75 mg daily., Disp: 90 tablet, Rfl: 3    sertraline (ZOLOFT) 50 mg tablet, Take 1 tablet (50 mg total) by mouth daily., Disp: 90 tablet, Rfl: 3    verapamiL (CALAN) 40 mg tablet, TAKE ONE TABLET BY MOUTH ONCE DAILY WHEN NEEDED, Disp: 90 tablet, Rfl: 3    verapamil SR (CALAN-SR) 240 mg CR tablet, Take 1 tablet (240 mg total) by mouth daily., Disp: 90 tablet, Rfl: 3    zoledronic acid/mannitol-water (RECLAST IV), Infuse into a venous catheter., Disp: , Rfl:     cetirizine (ZyrTEC) 10 mg tablet, Take 1 tablet (10 mg total) by mouth nightly. (Patient taking differently: Take 10 mg by mouth nightly as needed.), Disp: 90 tablet, Rfl: 1    Physical Exam  Vitals reviewed.   Constitutional:       General: She is not in acute distress.     Appearance: Normal appearance. She is well-developed. She  is not ill-appearing.   HENT:      Head: Normocephalic and atraumatic.      Right Ear: Tympanic membrane, ear canal and external ear normal.      Left Ear: Tympanic membrane, ear canal and external ear normal.      Nose: Nose normal.      Mouth/Throat:      Mouth: Mucous membranes are moist.      Pharynx: Oropharynx is clear. No oropharyngeal exudate.   Eyes:      General: No scleral icterus.     Conjunctiva/sclera: Conjunctivae normal.      Pupils: Pupils are equal, round, and reactive to light.   Cardiovascular:      Rate and Rhythm: Normal rate and regular rhythm.      Pulses: Normal pulses.      Heart sounds: Normal heart sounds. No murmur heard.  Pulmonary:      Effort: Pulmonary effort is normal. No respiratory distress.      Breath sounds: Normal breath sounds. No wheezing.   Abdominal:      General: Bowel sounds are normal. There is no distension.      Palpations: Abdomen is soft.      Tenderness: There is no abdominal tenderness. There is no guarding.   Musculoskeletal:         General: Tenderness present. No swelling, deformity or signs of injury. Normal range of motion.      Cervical back: Normal range of motion and neck supple. No muscular tenderness.      Right lower leg: No edema.      Left lower leg: No edema.      Comments: Tenderness right chest  Good ROM right arm and neck  No evidence of external injuries     Lymphadenopathy:      Cervical: No cervical adenopathy.   Skin:     General: Skin is warm and dry.      Findings: No rash.   Neurological:      General: No focal deficit present.      Mental Status: She is alert and oriented to person, place, and time.   Psychiatric:         Mood and Affect: Mood normal.         Behavior: Behavior normal.         Thought Content: Thought content normal.         Judgment: Judgment normal.       Lab Results   Component Value Date    WBC 3.96 05/27/2022    HGB 13.9 05/27/2022    HCT 41.7 05/27/2022     05/27/2022    CHOL 170 02/28/2025    TRIG 52  02/28/2025    HDL 86 02/28/2025    ALT 27 02/28/2025    AST 29 02/28/2025     08/26/2024    K 4.5 08/26/2024     (H) 08/26/2024    CREATININE 0.74 08/26/2024    BUN 15 08/26/2024    CO2 26 08/26/2024    TSH 1.080 02/28/2025     Problem List Items Addressed This Visit       Current non-smoker but past smoking history unknown     Remote smoker   Quit several years ago  Continues to be a non-smoker         Asthma     Triggers by cold  Uses inhaler PRN           Other Visit Diagnoses       Blunt trauma to chest, subsequent encounter    -  Primary    Relevant Orders    CT CHEST WITHOUT IV CONTRAST          Assessment & Plan  1. Chest pain.  The etiology of the pain appears to be musculoskeletal in nature, however, it has been persistent. The chest x-ray was unremarkable, but there is a history of smoking and lung disease with scarring. A non-contrast CT scan of the chest will be ordered to further investigate the cause of the pain. She is advised to continue taking Advil if it provides relief.    2. Osteopenia.  She is currently taking vitamin D and Reclast for her osteopenia.    3. Cold-induced asthma.  She uses albuterol for cold-induced asthma.     I spent 25 minutes on this date of service performing the following activities: obtaining history, performing examination, entering orders, documenting, preparing for visit, obtaining / reviewing records, providing counseling and education, independently reviewing study/studies, communicating results, and coordinating care.

## 2025-03-05 NOTE — TELEPHONE ENCOUNTER
Called and spoke with pt. Advised okay to take Repatha on Monday and then resume usual schedule of every 14 days. She verbalized understanding and appreciated the call. Thank you.

## 2025-03-05 NOTE — TELEPHONE ENCOUNTER
Patient calling in to see if someone can call her regarding Repatha.  She doesn't think it will arrive by Friday and is wanting to know if it is OK to wait until Monday to do it.    Patient is having a delivery at her house on Friday and doesn't think she would be able to come in to get a sample dose.    Please call patient at [phone] to advise

## 2025-03-13 LAB
ATRIAL RATE: 68
P AXIS: 69
PR INTERVAL: 170
QRS DURATION: 90
QT INTERVAL: 416
QTC CALCULATION(BAZETT): 442
R AXIS: -27
T WAVE AXIS: 50
VENTRICULAR RATE: 68

## 2025-03-24 ENCOUNTER — TELEPHONE (OUTPATIENT)
Dept: SCHEDULING | Facility: CLINIC | Age: 75
End: 2025-03-24
Payer: MEDICARE

## 2025-03-24 DIAGNOSIS — E78.5 DYSLIPIDEMIA: ICD-10-CM

## 2025-03-24 RX ORDER — EZETIMIBE 10 MG/1
10 TABLET ORAL DAILY
Qty: 90 TABLET | Refills: 1 | Status: SHIPPED | OUTPATIENT
Start: 2025-03-24 | End: 2025-09-20

## 2025-03-24 RX ORDER — ROSUVASTATIN CALCIUM 5 MG/1
5 TABLET, COATED ORAL DAILY
Qty: 90 TABLET | Refills: 1 | Status: SHIPPED | OUTPATIENT
Start: 2025-03-24 | End: 2025-09-20

## 2025-03-24 NOTE — TELEPHONE ENCOUNTER
Pt calling to request a new script for rosuvastatin 5mg to 1 tablet daily as pt states Dr. Torres increased medication. Pt also would like to clarify that they are to have zetia 10mg 1 tablet daily and requests a script for that be sent as well to [place] [facility].     Pt can be reached at [phone]

## 2025-03-27 ENCOUNTER — HOSPITAL ENCOUNTER (OUTPATIENT)
Dept: RADIOLOGY | Age: 75
Discharge: HOME | End: 2025-03-27
Attending: NURSE PRACTITIONER
Payer: MEDICARE

## 2025-03-27 DIAGNOSIS — S29.8XXD BLUNT TRAUMA TO CHEST, SUBSEQUENT ENCOUNTER: ICD-10-CM

## 2025-03-27 PROCEDURE — 71250 CT THORAX DX C-: CPT

## 2025-03-28 ENCOUNTER — RESULTS FOLLOW-UP (OUTPATIENT)
Dept: INTERNAL MEDICINE | Facility: CLINIC | Age: 75
End: 2025-03-28
Payer: MEDICARE

## 2025-03-28 DIAGNOSIS — R91.8 MULTIPLE LUNG NODULES ON CT: Primary | ICD-10-CM

## 2025-03-28 NOTE — TELEPHONE ENCOUNTER
I was able to connect with the patient.  Her cell phone did not have good connection.  She moved to a new area with better coverage.  Reviewed recent CT that was ordered to evaluate her chest discomfort after a fall she on 2/13/2025.  The CT was ordered on 3/5/2025 and completed yesterday 3/27/2025.  It did show subacute healing fractures at her 4th and 5th ribs.  She is more concerned with the other findings and is willing to repeat the study in 6-12 months to monitor the lung nodules based on the recommendations.  Will enter the order today and patient will follow up in the office.

## 2025-03-28 NOTE — TELEPHONE ENCOUNTER
Reason for call: speak to someone    Patient provider:Aissatou Roblero    Callback: Yes or No yes    Action needed to resolve:

## 2025-03-28 NOTE — TELEPHONE ENCOUNTER
Called to review recent CT chest.  Connected and then the patients line when dead.  Called back and left a message.

## 2025-04-03 ENCOUNTER — OFFICE VISIT (OUTPATIENT)
Dept: INTERNAL MEDICINE | Facility: CLINIC | Age: 75
End: 2025-04-03
Payer: MEDICARE

## 2025-04-03 VITALS
DIASTOLIC BLOOD PRESSURE: 78 MMHG | BODY MASS INDEX: 27.6 KG/M2 | HEIGHT: 62 IN | RESPIRATION RATE: 18 BRPM | HEART RATE: 69 BPM | OXYGEN SATURATION: 96 % | SYSTOLIC BLOOD PRESSURE: 120 MMHG | WEIGHT: 150 LBS

## 2025-04-03 DIAGNOSIS — S22.41XD CLOSED FRACTURE OF MULTIPLE RIBS OF RIGHT SIDE WITH ROUTINE HEALING, SUBSEQUENT ENCOUNTER: ICD-10-CM

## 2025-04-03 DIAGNOSIS — M79.672 PAIN OF BOTH HEELS: ICD-10-CM

## 2025-04-03 DIAGNOSIS — M79.671 PAIN OF BOTH HEELS: ICD-10-CM

## 2025-04-03 DIAGNOSIS — M54.2 NECK PAIN: ICD-10-CM

## 2025-04-03 DIAGNOSIS — R25.2 LEG CRAMPS: ICD-10-CM

## 2025-04-03 DIAGNOSIS — M80.00XA AGE-RELATED OSTEOPOROSIS WITH CURRENT PATHOLOGICAL FRACTURE, INITIAL ENCOUNTER: ICD-10-CM

## 2025-04-03 DIAGNOSIS — D69.2 SENILE PURPURA (CMS/HCC): ICD-10-CM

## 2025-04-03 DIAGNOSIS — K44.9 HIATAL HERNIA: ICD-10-CM

## 2025-04-03 DIAGNOSIS — N28.1 RENAL CYST: ICD-10-CM

## 2025-04-03 DIAGNOSIS — I73.00 RAYNAUD'S DISEASE WITHOUT GANGRENE: ICD-10-CM

## 2025-04-03 DIAGNOSIS — R06.09 DOE (DYSPNEA ON EXERTION): Primary | ICD-10-CM

## 2025-04-03 DIAGNOSIS — J98.4 SCARRING OF LUNG: ICD-10-CM

## 2025-04-03 PROCEDURE — 99214 OFFICE O/P EST MOD 30 MIN: CPT | Performed by: INTERNAL MEDICINE

## 2025-04-03 PROCEDURE — G2211 COMPLEX E/M VISIT ADD ON: HCPCS | Performed by: INTERNAL MEDICINE

## 2025-04-03 RX ORDER — ALBUTEROL SULFATE 90 UG/1
2 INHALANT RESPIRATORY (INHALATION) 4 TIMES DAILY PRN
Qty: 8.5 EACH | Refills: 3 | Status: SHIPPED | OUTPATIENT
Start: 2025-04-03

## 2025-04-03 NOTE — PROGRESS NOTES
Patient ID: Kelly Meeks, : 1950 is a 74 y.o. female who presents for Follow-up    Consent obtained from patient and all parties present in the room? yes  Attestation    Subjective     History of Present Illness  The patient presents for evaluation of ground-glass nodule, rib fractures, hiatal hernia, renal cyst, degenerative changes in the spine, Raynaud's phenomenon, ecchymosis, plantar fasciitis, and health maintenance.    She has been experiencing dyspnea, particularly during physical exertion such as walking her dog, exposure to extreme temperatures, or when performing strenuous tasks like moving heavy trash cans. She has been using her albuterol inhaler more frequently and is uncertain if it is the most effective treatment for her condition. She does not have a pulmonologist.    She reports a fall where she landed face forward on a rolled rug, resulting in fractures of the 4th and 5th ribs. The pain from this incident was prolonged and occasionally recurs, radiating around her back to the other side.    She is currently on omeprazole for reflux symptoms, which she initially took for 30 days as advised by Dr. Bunn. However, upon discontinuation, her reflux symptoms returned.    She has a 1.1 cm left renal cyst.    She has been experiencing significant shoulder, back, and neck pain, for which she has been taking Advil. She alternates this with Tylenol every other night.    She has been taking vitamin D 1000 units and Reclast for bone health due to a suboptimal DEXA scan result of 2.5. She also takes magnesium supplements to manage severe leg cramps induced by statins and Zetia. She was previously on fish oil with CoQ10 but discontinued it under Dr. Torres's advice. She has recently started taking magnesium pills and used to apply Theraflex foam, which provided some relief but did not completely alleviate the symptoms.    She has noticed frequent bruising without any known trauma. She had a large dark  "spot on her arm, which has since resolved. She consulted a dermatologist who reassured her that it was not a growth.    She also reports episodes of her fingers turning purple, accompanied by tingling sensations.    A few days ago, she experienced severe bilateral heel pain that rendered her unable to walk for two days, but the pain has since subsided.    MEDICATIONS  Current: omeprazole, vitamin D, Reclast, magnesium, Zetia, albuterol  Past: fish oil with CoQ10    The following have been reviewed and updated as appropriate in this visit:  Past medical history, Social History, Family History, Medications, Allergies     Physical exam:  Visit Vitals  /78 (BP Location: Left upper arm, Patient Position: Sitting)   Pulse 69   Resp 18   Ht 1.575 m (5' 2\")   Wt 68 kg (150 lb)   SpO2 96%   BMI 27.44 kg/m²     Physical Exam  No crackling or wheezing in the lungs.  Heart sounds are regular. No murmurs detected.  Wt Readings from Last 10 Encounters:   04/03/25 68 kg (150 lb)   02/11/25 68 kg (150 lb)   01/21/25 66.2 kg (146 lb)   01/17/25 66.9 kg (147 lb 6.4 oz)   12/11/24 67.6 kg (149 lb)   12/05/24 67.7 kg (149 lb 3.2 oz)   12/05/24 65.8 kg (145 lb)   11/06/24 66.2 kg (146 lb)   08/29/24 64.2 kg (141 lb 9.6 oz)   06/27/24 66.7 kg (147 lb)       BP Readings from Last 3 Encounters:   04/03/25 120/78   03/05/25 120/70   02/11/25 126/70      data:    Below is the patient's most recent value for Albumin, ALT, AST, BUN, Calcium, Chloride, Cholesterol, CO2, Creatinine, GFR, Glucose, HDL, Hematocrit, Hemoglobin, Hemoglobin A1C, LDL, Magnesium, Phosphorus, Platelets, Potassium, PSA, Sodium, Triglycerides, and WBC.   Lab Results   Component Value Date    ALBUMIN 4.4 02/28/2025    ALT 27 02/28/2025    AST 29 02/28/2025    BUN 15 08/26/2024    CALCIUM 9.1 06/27/2023     (H) 08/26/2024    CHOL 170 02/28/2025    CO2 26 08/26/2024    CREATININE 0.74 08/26/2024    HDL 86 02/28/2025    HCT 41.7 05/27/2022    HGB 13.9 05/27/2022 "     05/27/2022    K 4.5 08/26/2024     08/26/2024    TRIG 52 02/28/2025    WBC 3.96 05/27/2022       Lab Results   Component Value Date    TSH 1.080 02/28/2025    TSH 0.51 09/28/2023       Lab Results   Component Value Date    LDLCALC 73 02/28/2025    LDLCALC 154 (H) 01/03/2025    CREATININE 0.74 08/26/2024    CREATININE 0.89 12/28/2023    CREATININE 0.8 06/27/2023    CREATININE 0.8 05/27/2022    CREATININE 0.8 10/17/2019      Lab Results   Component Value Date    VITD25 43 05/27/2022      Results  Imaging  CT of the chest shows a subtle 8 mm left upper lobe ground glass nodule, 5 mm and 3 mm ones are stable. Linear scarring of the left upper lobe, right middle lobe and lower lobes new linear scarring and subsegmental atelectasis in the right upper lobe. No acute infiltrate, no pneumothorax, central airways are clear. Subacute rib fractures. Some calcifications in the aorta. Hiatal hernia. 1.1 cm left renal cyst. Degenerative changes in the spine.    Assessment & Plan  1. Ground-glass nodule.  The CT scan revealed an 8 mm ground-glass nodule in the left upper lobe, which is new since 2019. This could be due to a resolving infection, autoimmune disease, or heart failure. Pulmonary function testing will be conducted to assess lung volume and capacity. A referral to a pulmonologist will be made for further evaluation. If the scarring impacts pulmonary function, a combination of inhaled medications and pulmonary rehabilitation may be considered.    2. Rib fractures.  The patient has subacute rib fractures on the right side of the anterior chest wall, likely involving the 4th and 5th ribs. These injuries can take months to fully resolve due to the nerve tissue involved. The patient reports occasional pain, which is expected. No acute treatment is necessary as the fractures appear to be healing normally.    3. Hiatal hernia.  The patient has a hiatal hernia, which can vary in severity and potentially impact  lung function or exacerbate reflux symptoms. The patient is currently on omeprazole (Prilosec) and will continue this medication. The risks and benefits of long-term use have been discussed, including potential impacts on calcium and magnesium absorption. The patient will continue monitoring bone mineral density with DEXA scans every 2 years.    4. Renal cyst.  A 1.1 cm left renal cyst was noted. Renal cysts are generally benign. An ultrasound of the kidney will be ordered to obtain a clearer picture.    5. Degenerative changes in the spine.  The patient has degenerative changes in the spine, consistent with arthritis. Physical therapy will be considered to address related symptoms such as shoulder, back, and neck pain.    6. Raynaud's phenomenon.  The patient experiences episodes of fingers turning purple, indicative of Raynaud's phenomenon. The patient is advised to keep the hands warm during these episodes.    7. Ecchymosis.  The patient has ecchymosis, likely age-related bruising. The patient is advised to use Advil sparingly due to potential side effects, including increased bruising, elevated blood pressure, and negative impacts on kidney function. Tylenol is recommended as a better alternative.    8. Plantar fasciitis.  The patient reported episodes of heel pain, likely related to plantar fasciitis. If the condition recurs, the patient is advised to rest, apply ice, and perform calf stretching exercises.    9. Health maintenance.  The patient is currently taking vitamin D 1000 units daily and Reclast for osteoporosis. The patient is also taking magnesium supplements for leg cramps caused by statins and Zetia. A CoQ10 supplement (100 mg) and vitamin K2 (200 mcg) are recommended to help with muscle metabolism and leg cramps. The patient will inquire about a measles booster at the pharmacy and report any issues.    Follow-up  The patient will follow up in July.    On date of service I spent 36 minutes on the  following activities  Preparing for visit  Obtaining/reviewing records  Obtaining history/performing examination  Providing counseling and education  Independently reviewing studies  Communicating results  Coordinating care  Documenting  Ordering tests/medications/procedures    I attest that this visit supports the complexity inherent to evaluation and management associated with medical care services that serve as the continuing focal point for all needed health care services and/or medical care services that are part of ongoing care related to this patient's single, serious condition or a complex condition.

## 2025-04-11 ENCOUNTER — HOSPITAL ENCOUNTER (OUTPATIENT)
Dept: RADIOLOGY | Age: 75
Discharge: HOME | End: 2025-04-11
Attending: INTERNAL MEDICINE
Payer: MEDICARE

## 2025-04-11 ENCOUNTER — RESULTS FOLLOW-UP (OUTPATIENT)
Dept: INTERNAL MEDICINE | Facility: CLINIC | Age: 75
End: 2025-04-11

## 2025-04-11 DIAGNOSIS — R06.09 DOE (DYSPNEA ON EXERTION): ICD-10-CM

## 2025-04-11 DIAGNOSIS — M80.00XA AGE-RELATED OSTEOPOROSIS WITH CURRENT PATHOLOGICAL FRACTURE, INITIAL ENCOUNTER: ICD-10-CM

## 2025-04-11 DIAGNOSIS — K44.9 HIATAL HERNIA: ICD-10-CM

## 2025-04-11 DIAGNOSIS — S22.41XD CLOSED FRACTURE OF MULTIPLE RIBS OF RIGHT SIDE WITH ROUTINE HEALING, SUBSEQUENT ENCOUNTER: ICD-10-CM

## 2025-04-11 DIAGNOSIS — M54.2 NECK PAIN: ICD-10-CM

## 2025-04-11 DIAGNOSIS — J98.4 SCARRING OF LUNG: ICD-10-CM

## 2025-04-11 DIAGNOSIS — N28.1 RENAL CYST: ICD-10-CM

## 2025-04-11 DIAGNOSIS — R25.2 LEG CRAMPS: ICD-10-CM

## 2025-04-11 PROCEDURE — 76775 US EXAM ABDO BACK WALL LIM: CPT

## 2025-04-14 NOTE — RESULT ENCOUNTER NOTE
RN spoke with patient as per Dr Bran  Please let the patient know that ultrasound was consistent with a simple cyst which is most frequently a benign lesion we will plan on doing a repeat ultrasound in about a year to monitor the cyst

## 2025-05-02 DIAGNOSIS — Z79.899 MEDICATION MANAGEMENT: Primary | ICD-10-CM

## 2025-05-27 ENCOUNTER — TELEPHONE (OUTPATIENT)
Dept: INTERNAL MEDICINE | Facility: CLINIC | Age: 75
End: 2025-05-27
Payer: MEDICARE

## 2025-06-24 DIAGNOSIS — J45.909 PERSISTENT ASTHMA WITHOUT COMPLICATION, UNSPECIFIED ASTHMA SEVERITY: ICD-10-CM

## 2025-06-24 DIAGNOSIS — E03.9 ACQUIRED HYPOTHYROIDISM: ICD-10-CM

## 2025-06-24 RX ORDER — MONTELUKAST SODIUM 10 MG/1
TABLET ORAL
Qty: 90 TABLET | Refills: 3 | Status: SHIPPED | OUTPATIENT
Start: 2025-06-24

## 2025-06-24 RX ORDER — LEVOTHYROXINE SODIUM 100 UG/1
TABLET ORAL
Qty: 90 TABLET | Refills: 3 | Status: SHIPPED | OUTPATIENT
Start: 2025-06-24

## 2025-06-24 NOTE — TELEPHONE ENCOUNTER
Medicine Refill Request    Last Office Visit: 4/3/2025   Last Consult Visit: Visit date not found  Last Telemedicine Visit: Visit date not found    Next Appointment: 7/24/2025      Current Outpatient Medications:     acetylcysteine (MUCOMYST) 200 mg/mL (20 %) nebulizer solution, Take 4 mL by nebulization every 4 (four) hours., Disp: , Rfl:     albuterol HFA 90 mcg/actuation inhaler, Inhale 2 puffs 4 (four) times a day as needed for wheezing or shortness of breath., Disp: 8.5 each, Rfl: 3    cetirizine (ZyrTEC) 10 mg tablet, Take 1 tablet (10 mg total) by mouth nightly. (Patient taking differently: Take 10 mg by mouth nightly as needed.), Disp: 90 tablet, Rfl: 1    cholecalciferol, vitamin D3, (VITAMIN D3) 2,000 unit tablet, Take 2,000 Units by mouth daily. (Patient taking differently: Take 1,000 Units by mouth daily.), Disp: , Rfl:     coenzyme Q10 (CO Q-10) 10 mg capsule, Take 10 mg by mouth daily., Disp: , Rfl:     DOCOSAHEXANOIC ACID/EPA (FISH OIL ORAL), Take 3,000 mg by mouth daily. (Patient taking differently: Take 1,500 mg by mouth daily.), Disp: , Rfl:     evolocumab (REPATHA SURECLICK) 140 mg/mL pen, Inject 1 mL (140 mg total) under the skin every 14 (fourteen) days., Disp: 6 mL, Rfl: 3    ezetimibe (ZETIA) 10 mg tablet, Take 1 tablet (10 mg total) by mouth daily., Disp: 90 tablet, Rfl: 1    levothyroxine (SYNTHROID) 100 mcg tablet, TAKE ONE TABLET BY MOUTH ONCE DAILY, Disp: 90 tablet, Rfl: 3    melatonin 5 mg capsule, Take 13 mg by mouth nightly. (Patient taking differently: Take 10 mg by mouth nightly.), Disp: , Rfl:     montelukast (SINGULAIR) 10 mg tablet, TAKE ONE (1) TABLET (10 MG TOTAL) BY MOUTH NIGHTLY., Disp: 90 tablet, Rfl: 3    omeprazole (PriLOSEC) 40 mg capsule, Take 1 capsule (40 mg total) by mouth daily before breakfast., Disp: 90 capsule, Rfl: 1    rosuvastatin (CRESTOR) 5 mg tablet, Take 1 tablet (5 mg total) by mouth daily., Disp: 90 tablet, Rfl: 1    sertraline (ZOLOFT) 25 mg tablet, Take  "1 tablet (25 mg total) by mouth daily. Take with your 50 mg tablet for a total dose of 75 mg daily., Disp: 90 tablet, Rfl: 3    sertraline (ZOLOFT) 50 mg tablet, Take 1 tablet (50 mg total) by mouth daily., Disp: 90 tablet, Rfl: 3    verapamiL (CALAN) 40 mg tablet, TAKE ONE TABLET BY MOUTH ONCE DAILY WHEN NEEDED, Disp: 90 tablet, Rfl: 3    verapamil SR (CALAN-SR) 240 mg CR tablet, Take 1 tablet (240 mg total) by mouth daily., Disp: 90 tablet, Rfl: 3    zoledronic acid/mannitol-water (RECLAST IV), Infuse into a venous catheter., Disp: , Rfl:     BP Readings from Last 3 Encounters:   04/03/25 120/78   03/05/25 120/70   02/11/25 126/70       Recent Lab results:  Lab Results   Component Value Date    CHOL 170 02/28/2025   ,   Lab Results   Component Value Date    HDL 86 02/28/2025   ,   Lab Results   Component Value Date    LDLCALC 73 02/28/2025   ,   Lab Results   Component Value Date    TRIG 52 02/28/2025        Lab Results   Component Value Date    GLUCOSE 86 08/26/2024   , No results found for: \"HGBA1C\"      Lab Results   Component Value Date    CREATININE 0.74 08/26/2024       Lab Results   Component Value Date    TSH 1.080 02/28/2025         No results found for: \"HGBA1C\"  "

## 2025-07-08 LAB
CHOLEST SERPL-MCNC: 154 MG/DL (ref 100–199)
HDLC SERPL-MCNC: 70 MG/DL
LDLC SERPL CALC-MCNC: 72 MG/DL (ref 0–99)
TRIGL SERPL-MCNC: 60 MG/DL (ref 0–149)
VLDLC SERPL CALC-MCNC: 12 MG/DL (ref 5–40)

## 2025-07-24 ENCOUNTER — APPOINTMENT (OUTPATIENT)
Dept: LAB | Facility: HOSPITAL | Age: 75
End: 2025-07-24
Attending: INTERNAL MEDICINE
Payer: MEDICARE

## 2025-07-24 ENCOUNTER — OFFICE VISIT (OUTPATIENT)
Dept: INTERNAL MEDICINE | Facility: CLINIC | Age: 75
End: 2025-07-24
Payer: MEDICARE

## 2025-07-24 VITALS
OXYGEN SATURATION: 97 % | DIASTOLIC BLOOD PRESSURE: 70 MMHG | RESPIRATION RATE: 18 BRPM | BODY MASS INDEX: 27.6 KG/M2 | HEIGHT: 62 IN | SYSTOLIC BLOOD PRESSURE: 122 MMHG | HEART RATE: 66 BPM | WEIGHT: 150 LBS

## 2025-07-24 DIAGNOSIS — R41.89 COGNITIVE CHANGES: ICD-10-CM

## 2025-07-24 DIAGNOSIS — E03.9 ACQUIRED HYPOTHYROIDISM: ICD-10-CM

## 2025-07-24 DIAGNOSIS — M79.10 MUSCLE SORENESS: ICD-10-CM

## 2025-07-24 DIAGNOSIS — E78.00 HYPERCHOLESTEROLEMIA: ICD-10-CM

## 2025-07-24 DIAGNOSIS — R14.1 GAS PAIN: ICD-10-CM

## 2025-07-24 DIAGNOSIS — Z79.899 MEDICATION MANAGEMENT: ICD-10-CM

## 2025-07-24 DIAGNOSIS — H90.3 SENSORINEURAL HEARING LOSS (SNHL) OF BOTH EARS: ICD-10-CM

## 2025-07-24 DIAGNOSIS — R41.89 COGNITIVE CHANGES: Primary | ICD-10-CM

## 2025-07-24 DIAGNOSIS — R06.02 SOB (SHORTNESS OF BREATH): ICD-10-CM

## 2025-07-24 DIAGNOSIS — K21.9 GASTROESOPHAGEAL REFLUX DISEASE WITHOUT ESOPHAGITIS: ICD-10-CM

## 2025-07-24 LAB
ALBUMIN SERPL-MCNC: 4.2 G/DL (ref 3.5–5.7)
ALBUMIN SERPL-MCNC: 4.2 G/DL (ref 3.5–5.7)
ALP SERPL-CCNC: 56 IU/L (ref 34–125)
ALP SERPL-CCNC: 56 IU/L (ref 34–125)
ALT SERPL-CCNC: 22 IU/L (ref 7–52)
ALT SERPL-CCNC: 22 IU/L (ref 7–52)
ANION GAP SERPL CALC-SCNC: 7 MEQ/L (ref 3–15)
AST SERPL-CCNC: 23 IU/L (ref 13–39)
AST SERPL-CCNC: 23 IU/L (ref 13–39)
BASOPHILS # BLD: 0.05 K/UL (ref 0.01–0.1)
BASOPHILS NFR BLD: 0.9 %
BILIRUB DIRECT SERPL-MCNC: 0.1 MG/DL
BILIRUB SERPL-MCNC: 0.5 MG/DL (ref 0.3–1.2)
BILIRUB SERPL-MCNC: 0.5 MG/DL (ref 0.3–1.2)
BUN SERPL-MCNC: 18 MG/DL (ref 7–25)
CALCIUM SERPL-MCNC: 9.1 MG/DL (ref 8.6–10.3)
CHLORIDE SERPL-SCNC: 105 MEQ/L (ref 98–107)
CK SERPL-CCNC: 110 U/L (ref 30–223)
CO2 SERPL-SCNC: 26 MEQ/L (ref 21–31)
CREAT SERPL-MCNC: 0.7 MG/DL (ref 0.6–1.2)
CRP SERPL-MCNC: 2.3 MG/L
DIFFERENTIAL METHOD BLD: ABNORMAL
EGFRCR SERPLBLD CKD-EPI 2021: >60 ML/MIN/1.73M*2
EOSINOPHIL # BLD: 0.23 K/UL (ref 0.04–0.36)
EOSINOPHIL NFR BLD: 4.1 %
ERYTHROCYTE [DISTWIDTH] IN BLOOD BY AUTOMATED COUNT: 13.8 % (ref 11.7–14.4)
ERYTHROCYTE [SEDIMENTATION RATE] IN BLOOD BY WESTERGREN METHOD: 16 MM/HR
GLUCOSE SERPL-MCNC: 117 MG/DL (ref 70–99)
HCT VFR BLD AUTO: 40.7 % (ref 35–45)
HGB BLD-MCNC: 13.5 G/DL (ref 11.8–15.7)
IMM GRANULOCYTES # BLD AUTO: 0.01 K/UL (ref 0–0.08)
IMM GRANULOCYTES NFR BLD AUTO: 0.2 %
LYMPHOCYTES # BLD: 1.48 K/UL (ref 1.2–3.5)
LYMPHOCYTES NFR BLD: 26.2 %
MAGNESIUM SERPL-MCNC: 1.9 MG/DL (ref 1.8–2.5)
MCH RBC QN AUTO: 30.6 PG (ref 28–33.2)
MCHC RBC AUTO-ENTMCNC: 33.2 G/DL (ref 32.2–35.5)
MCV RBC AUTO: 92.3 FL (ref 83–98)
MONOCYTES # BLD: 0.48 K/UL (ref 0.28–0.8)
MONOCYTES NFR BLD: 8.5 %
NEUTROPHILS # BLD: 3.4 K/UL (ref 1.7–7)
NEUTS SEG NFR BLD: 60.1 %
NRBC BLD-RTO: 0 %
PLATELET # BLD AUTO: 276 K/UL (ref 150–369)
PMV BLD AUTO: 8.9 FL (ref 9.4–12.3)
POTASSIUM SERPL-SCNC: 4.2 MEQ/L (ref 3.5–5.1)
PROT SERPL-MCNC: 6.7 G/DL (ref 6–8.2)
PROT SERPL-MCNC: 6.7 G/DL (ref 6–8.2)
RBC # BLD AUTO: 4.41 M/UL (ref 3.93–5.22)
SODIUM SERPL-SCNC: 138 MEQ/L (ref 136–145)
TSH SERPL DL<=0.05 MIU/L-ACNC: 0.54 MIU/L (ref 0.34–5.6)
VIT B12 SERPL-MCNC: 1017 PG/ML (ref 180–914)
WBC # BLD AUTO: 5.65 K/UL (ref 3.8–10.5)

## 2025-07-24 PROCEDURE — 82248 BILIRUBIN DIRECT: CPT

## 2025-07-24 PROCEDURE — 82550 ASSAY OF CK (CPK): CPT

## 2025-07-24 PROCEDURE — 86038 ANTINUCLEAR ANTIBODIES: CPT

## 2025-07-24 PROCEDURE — 84443 ASSAY THYROID STIM HORMONE: CPT

## 2025-07-24 PROCEDURE — 85652 RBC SED RATE AUTOMATED: CPT

## 2025-07-24 PROCEDURE — 36415 COLL VENOUS BLD VENIPUNCTURE: CPT

## 2025-07-24 PROCEDURE — 83735 ASSAY OF MAGNESIUM: CPT

## 2025-07-24 PROCEDURE — 80053 COMPREHEN METABOLIC PANEL: CPT

## 2025-07-24 PROCEDURE — 86140 C-REACTIVE PROTEIN: CPT

## 2025-07-24 PROCEDURE — 85025 COMPLETE CBC W/AUTO DIFF WBC: CPT

## 2025-07-24 PROCEDURE — 99214 OFFICE O/P EST MOD 30 MIN: CPT | Performed by: INTERNAL MEDICINE

## 2025-07-24 PROCEDURE — G2211 COMPLEX E/M VISIT ADD ON: HCPCS | Performed by: INTERNAL MEDICINE

## 2025-07-24 PROCEDURE — 82607 VITAMIN B-12: CPT

## 2025-07-24 RX ORDER — OMEPRAZOLE 40 MG/1
40 CAPSULE, DELAYED RELEASE ORAL
Qty: 90 CAPSULE | Refills: 1 | Status: SHIPPED | OUTPATIENT
Start: 2025-07-24 | End: 2026-01-20

## 2025-07-24 NOTE — PROGRESS NOTES
Patient ID: Kelly Meeks, : 1950 is a 74 y.o. female who presents for Follow-up    Consent obtained from patient and all parties present in the room? yes  Attestation    Subjective     History of Present Illness  The patient presents for evaluation of cognitive slowness, forgetfulness, difficulty concentrating, depression, hair thinning, weight gain, muscle soreness, elevated Lp(a), numbness and tingling in the left hand, hearing loss, shortness of breath, and thyroid issues.    She has been experiencing cognitive slowness, forgetfulness, and difficulty concentrating since 2025. She is currently on Zoloft for depression. She often needs a nap in the middle of the day and has low energy. She has been taking 4 capsules a day from [employer].    She has noticed a change in her hearing, requiring her to increase the volume on her new TV from 20 to 50. She is unsure if this is due to the TV or a change in her hearing.    She has been experiencing shortness of breath while walking her dog. She has an appointment with Dr. Torres next week. She has been using her inhaler more regularly.    She has been experiencing numbness and tingling in her left hand upon waking up each morning, which resolves as the day progresses. She received steroid injections for trigger finger last year, but this current issue feels different.    She has been experiencing some sweats, not necessarily night sweats, but sometimes during the day.    She has been experiencing gas problems and has been taking Gas-X for that. She needs a refill on omeprazole. She tried going off of it but could not tolerate it.    She has been experiencing hair thinning and brittleness. She is unsure if she is taking any biotin supplements.    She has gained 20 pounds over the past decade and struggles to lose weight. Her balance is not always stable, and she experiences difficulty kneeling and stooping after sitting for 10 to 15 minutes. She has  "significant changes in her reactions to fragrances like Tide. She has been taking magnesium daily and tried K2, but it did not help, so she stopped taking it.    She has highly raised levels of Lp(a), five times what they should be. Despite taking Repatha, a statin, and Zetia, she has not been able to lower her LDL levels closer to 50.    She has been experiencing constant soreness in her shoulder muscles, particularly at night, and in her upper arms. The muscles in her left leg and buttock throb at night, often disrupting her sleep. She suspects she may have sciatica. She has been under the care of Dr. Faith for osteoporosis management and receives Reclast annually. She also reports knee pain. She did not experience this type of pain 3 years ago. She recalls receiving a flu shot that caused prolonged pain, which she was told might take a year and a half to resolve. However, she still experiences pain on both sides, more pronounced on her left side. Despite using Advil or ibuprofen and Theraworx, the pain persists. Upon waking, she can barely walk due to pain in her heels.    Her thyroid function has been fluctuating, with a TSH level of 1.080 or 1.170 about a year ago, now at 4.435. She is concerned about potential hyperthyroidism and its impact on her lipid levels.    PAST SURGICAL HISTORY:  Steroid injections for trigger finger in 2024  Flu shot approximately 3 years ago  Reclast for osteoporosis management    The following have been reviewed and updated as appropriate in this visit:  Past medical history, Social History, Family History, Medications, Allergies     Physical exam:  Visit Vitals  /70   Pulse 66   Resp 18   Ht 1.575 m (5' 2\")   Wt 68 kg (150 lb)   SpO2 97%   BMI 27.44 kg/m²     Physical Exam  Respiratory: Clear to auscultation, no wheezing, rales or rhonchi  Cardiovascular: Regular rate and rhythm, no murmurs, rubs, or gallops  Wt Readings from Last 10 Encounters:   07/24/25 68 kg (150 lb) "   04/03/25 68 kg (150 lb)   02/11/25 68 kg (150 lb)   01/21/25 66.2 kg (146 lb)   01/17/25 66.9 kg (147 lb 6.4 oz)   12/11/24 67.6 kg (149 lb)   12/05/24 67.7 kg (149 lb 3.2 oz)   12/05/24 65.8 kg (145 lb)   11/06/24 66.2 kg (146 lb)   08/29/24 64.2 kg (141 lb 9.6 oz)       BP Readings from Last 3 Encounters:   07/24/25 122/70   04/03/25 120/78   03/05/25 120/70      data:    Below is the patient's most recent value for Albumin, ALT, AST, BUN, Calcium, Chloride, Cholesterol, CO2, Creatinine, GFR, Glucose, HDL, Hematocrit, Hemoglobin, Hemoglobin A1C, LDL, Magnesium, Phosphorus, Platelets, Potassium, PSA, Sodium, Triglycerides, and WBC.   Lab Results   Component Value Date    ALBUMIN 4.4 02/28/2025    ALT 27 02/28/2025    AST 29 02/28/2025    BUN 15 08/26/2024    CALCIUM 9.1 06/27/2023     (H) 08/26/2024    CHOL 154 07/07/2025    CO2 26 08/26/2024    CREATININE 0.74 08/26/2024    HDL 70 07/07/2025    HCT 41.7 05/27/2022    HGB 13.9 05/27/2022     05/27/2022    K 4.5 08/26/2024     08/26/2024    TRIG 60 07/07/2025    WBC 3.96 05/27/2022       Lab Results   Component Value Date    TSH 1.080 02/28/2025    TSH 0.51 09/28/2023       Lab Results   Component Value Date    LDLCALC 72 07/07/2025    LDLCALC 73 02/28/2025    CREATININE 0.74 08/26/2024    CREATININE 0.89 12/28/2023    CREATININE 0.8 06/27/2023    CREATININE 0.8 05/27/2022    CREATININE 0.8 10/17/2019      Lab Results   Component Value Date    VITD25 43 05/27/2022      Results  Labs   - TSH level: 02/28/2025, 1.080    Assessment & Plan  1. Cognitive changes.  - Reports cognitive slowness, forgetfulness, and difficulty concentrating since January.  - Neuropsychiatric testing will be conducted to assess for cognitive deficits consistent with mild cognitive impairment or major neurocognitive disorder.  - Information for neuropsychological testing will be provided.  - Monitoring of symptoms will continue.    2. Hearing loss.  - Reports a noticeable  change in hearing, requiring higher TV volume.  - Physical exam findings include clear lungs and regular heart sounds.  - Referral to an audiologist will be made for further evaluation.  - Monitoring of symptoms will continue.    3. Shortness of breath.  - Experiences shortness of breath while walking her dog.  - Consultation with cardiology will be initiated to discuss the potential need for a cardiac ischemic workup, including expanded testing for cardiac symptoms.  - If coronary artery disease is ruled out, lung problems will be investigated as a potential contributor to her shortness of breath.  - Monitoring of symptoms will continue.    4. Numbness and tingling in left hand.  - Reports numbness and tingling in her left hand every morning, which resolves throughout the day.  - Monitoring of symptoms will continue.  - Further evaluation will be considered if symptoms persist or worsen.  - No immediate treatment prescribed.    5. Sweats.  - Experiences sweats during the day.  - Monitoring of symptoms will continue.  - Further evaluation will be considered if symptoms persist or worsen.  - No immediate treatment prescribed.    6. Gas problems.  - Reports embarrassing gas problems and has been taking Gas-X.  - Refill for omeprazole will be sent to pharmacy.  - Discussed risks associated with long-term use of proton pump inhibitors, but benefits outweigh the risks given her current treatment for osteoporosis.  - Monitoring of symptoms will continue.    7. Hair thinning.  - Reports hair thinning and brittle texture.  - Monitoring of symptoms will continue.  - Further evaluation will be considered if symptoms persist or worsen.  - No immediate treatment prescribed.  - Thyroid function being monitored  8. Weight gain.  - Has gained 20 pounds over the last 10 years and struggles to lose it.  - Monitoring of symptoms will continue.  - Further evaluation will be considered if symptoms persist or worsen.  - No immediate  treatment prescribed.    9. Elevated Lp(a).  - Has highly raised levels of Lp(a), five times what they should be.  - Despite taking Repatha, a statin, and Zetia, has not been able to lower her LDL levels closer to 50.  - Consultation with cardiology will be initiated to discuss the potential need for a cardiac ischemic workup.  - Monitoring of symptoms will continue.    10. Muscle soreness.  - Reports generalized muscle soreness, particularly in the trapezius and upper arms, and throbbing in the left leg and buttock.  - Blood work will be ordered to check for evidence of autoimmune joint problems such as polymyalgia rheumatica.  - If tests indicate a positive result, a referral to a rheumatologist will be made.  - Monitoring of symptoms will continue.    11. Thyroid issues.  - TSH levels have fluctuated, with a recent reading of 4.435.  - Biotin can interfere with TSH test results, potentially leading to inaccurate readings.  - Adjusting the dose of Synthroid may help manage symptoms.  - A TSH test will be conducted today to assess current thyroid function.    Follow-up: The patient will follow up in 4 months.    On date of service I spent 35 minutes on the following activities  Preparing for visit  Obtaining/reviewing records  Obtaining history/performing examination  Providing counseling and education  Independently reviewing studies  Communicating results  Coordinating care  Documenting  Ordering tests/medications/procedures    I attest that this visit supports the complexity inherent to evaluation and management associated with medical care services that serve as the continuing focal point for all needed health care services and/or medical care services that are part of ongoing care related to this patient's single, serious condition or a complex condition.

## 2025-07-25 LAB — ANA SER QL IA: NEGATIVE

## 2025-07-31 ENCOUNTER — TELEPHONE (OUTPATIENT)
Dept: CARDIOLOGY | Facility: CLINIC | Age: 75
End: 2025-07-31

## 2025-07-31 ENCOUNTER — OFFICE VISIT (OUTPATIENT)
Dept: CARDIOLOGY | Facility: CLINIC | Age: 75
End: 2025-07-31
Payer: MEDICARE

## 2025-07-31 VITALS
DIASTOLIC BLOOD PRESSURE: 74 MMHG | HEIGHT: 62 IN | WEIGHT: 150 LBS | SYSTOLIC BLOOD PRESSURE: 120 MMHG | OXYGEN SATURATION: 96 % | HEART RATE: 73 BPM | BODY MASS INDEX: 27.6 KG/M2

## 2025-07-31 DIAGNOSIS — I35.0 AORTIC VALVE STENOSIS, ETIOLOGY OF CARDIAC VALVE DISEASE UNSPECIFIED: ICD-10-CM

## 2025-07-31 DIAGNOSIS — I65.29 CAROTID ATHEROSCLEROSIS, UNSPECIFIED LATERALITY: ICD-10-CM

## 2025-07-31 DIAGNOSIS — E78.5 DYSLIPIDEMIA: Primary | ICD-10-CM

## 2025-07-31 DIAGNOSIS — Z82.49 FAMILY HISTORY OF PREMATURE CORONARY ARTERY DISEASE: ICD-10-CM

## 2025-07-31 DIAGNOSIS — I47.10 SUPRAVENTRICULAR TACHYCARDIA (CMS/HCC): ICD-10-CM

## 2025-07-31 DIAGNOSIS — E78.41 ELEVATED LP(A): ICD-10-CM

## 2025-07-31 LAB
ATRIAL RATE: 64
P AXIS: 44
PR INTERVAL: 172
QRS DURATION: 90
QT INTERVAL: 426
QTC CALCULATION(BAZETT): 439
R AXIS: -8
T WAVE AXIS: 6
VENTRICULAR RATE: 64

## 2025-07-31 PROCEDURE — 93000 ELECTROCARDIOGRAM COMPLETE: CPT | Performed by: INTERNAL MEDICINE

## 2025-07-31 PROCEDURE — 99214 OFFICE O/P EST MOD 30 MIN: CPT | Performed by: INTERNAL MEDICINE

## 2025-07-31 RX ORDER — BEMPEDOIC ACID 180 MG/1
180 TABLET, FILM COATED ORAL DAILY
Qty: 30 TABLET | Refills: 3 | Status: SHIPPED | OUTPATIENT
Start: 2025-07-31

## 2025-07-31 RX ORDER — BEMPEDOIC ACID 180 MG/1
180 TABLET, FILM COATED ORAL DAILY
Qty: 30 TABLET | Refills: 3 | Status: SHIPPED | OUTPATIENT
Start: 2025-07-31 | End: 2025-07-31

## 2025-07-31 NOTE — PROGRESS NOTES
Ebenezer Torres MD, [facility]  Cardiology    [facility]    [facility]  [facility] [address]  [address] [place] [address]    TEL  [phone]  [facility]     07/31/25      Dear Dr. Bran:    It was my pleasure to see Ms. Kelly Meeks at the [facility] today.      As you know, this is a 74-year-old woman with a history of polygenic hypercholesterolemia, elevated LP(a), statin intolerance, aortic stenosis, carotid atherosclerosis, thoracic aortic calcification, family history of premature coronary artery disease and supraventricular tachycardia.      Kelly continues to tolerate rosuvastatin 5 mg, 4 days/week, ezetimibe daily and Repatha every 14 days.  She does note some intermittent muscle symptoms but nothing consistent.  No new cardiopulmonary symptoms.  No chest pain.  No syncope.  No lower extremity edema.  No significant palpitations.  She has occasional shortness of breath when out in the heat and humidity.  In other environments she has no dyspnea.  She has gained weight.  She has fatigue which she believes is related to her thyroid and other endocrinologic issues.  She has an appoint with endocrinology scheduled.    She has no history of gout or tendinopathy.    Past Medical History:  Past Medical History:   Diagnosis Date    Anxiety     Asthma     Coronary artery disease     Hyperlipidemia     Hypothyroidism      Past Surgical History:  Past Surgical History   Procedure Laterality Date    Adenoidectomy      Cardiac catheterization  1976    Tonsillectomy       Medications:  Current Outpatient Medications   Medication Sig Dispense Refill    albuterol HFA 90 mcg/actuation inhaler Inhale 2 puffs 4 (four) times a day as needed for wheezing or shortness of breath. 8.5 each 3    bempedoic acid (NEXLETOL) 180 mg tablet Take 1 tablet (180 mg total) by mouth daily. 30 tablet 3    cetirizine (ZyrTEC) 10 mg tablet Take 1 tablet (10 mg total)  by mouth nightly. (Patient taking differently: Take 10 mg by mouth nightly as needed.) 90 tablet 1    cholecalciferol, vitamin D3, (VITAMIN D3) 2,000 unit tablet Take 2,000 Units by mouth daily. (Patient taking differently: Take 1,000 Units by mouth daily.)      coenzyme Q10 (CO Q-10) 10 mg capsule Take 10 mg by mouth daily.      DOCOSAHEXANOIC ACID/EPA (FISH OIL ORAL) Take 3,000 mg by mouth daily. (Patient taking differently: Take 1,500 mg by mouth daily.)      evolocumab (REPATHA SURECLICK) 140 mg/mL pen Inject 1 mL (140 mg total) under the skin every 14 (fourteen) days. 6 mL 3    ezetimibe (ZETIA) 10 mg tablet Take 1 tablet (10 mg total) by mouth daily. 90 tablet 1    levothyroxine (SYNTHROID) 100 mcg tablet TAKE ONE TABLET BY MOUTH ONCE DAILY 90 tablet 3    melatonin 5 mg capsule Take 13 mg by mouth nightly. (Patient taking differently: Take 10 mg by mouth nightly.)      montelukast (SINGULAIR) 10 mg tablet TAKE ONE (1) TABLET (10 MG TOTAL) BY MOUTH NIGHTLY. 90 tablet 3    omeprazole (PriLOSEC) 40 mg capsule Take 1 capsule (40 mg total) by mouth daily before breakfast. 90 capsule 1    rosuvastatin (CRESTOR) 5 mg tablet Take 1 tablet (5 mg total) by mouth daily. 90 tablet 1    sertraline (ZOLOFT) 50 mg tablet Take 1 tablet (50 mg total) by mouth daily. 90 tablet 3    verapamiL (CALAN) 40 mg tablet TAKE ONE TABLET BY MOUTH ONCE DAILY WHEN NEEDED 90 tablet 3    verapamil SR (CALAN-SR) 240 mg CR tablet Take 1 tablet (240 mg total) by mouth daily. 90 tablet 3    zoledronic acid/mannitol-water (RECLAST IV) Infuse into a venous catheter.      acetylcysteine (MUCOMYST) 200 mg/mL (20 %) nebulizer solution Take 4 mL by nebulization every 4 (four) hours.      sertraline (ZOLOFT) 25 mg tablet Take 1 tablet (25 mg total) by mouth daily. Take with your 50 mg tablet for a total dose of 75 mg daily. (Patient not taking: Reported on 7/31/2025) 90 tablet 3     No current facility-administered medications for this visit.        Allergies: Epinephrine and Procaine    Social History: The patient lives with her daughter.  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  Occasional glass of wine.  She quit smoking 15 years ago.    Family History: Father had multiple myocardial infarctions in his 30's.  Mother with possible coronary artery disease and stroke.  Brother with hypertension.    Review of Systems: A complete 14-point review of systems is negative, except as noted in the HPI.    Exam:   Objective   Vitals:    07/31/25 1110   BP: 120/74   Pulse: 73   SpO2: 96%     Body mass index is 27.44 kg/m².  Wt Readings from Last 3 Encounters:   07/31/25 68 kg (150 lb)   07/24/25 68 kg (150 lb)   04/03/25 68 kg (150 lb)     Constitutional: Appears comfortable.   Eyes: No icterus.   ENT: Deferred.   Neck: No jugular venous distention.   Vascular: Carotid bruits versus radiated heart sounds.  Cardiac: Normal S1 and S2, regular rhythm.  There is a 2/6 systolic murmur at the base.  Lungs: Clear to auscultation bilaterally.    GI: Nondistended.  Extremities: Warm. No lower extremity edema.   Skin: Dry.  Neurologic: Awake, alert, oriented.    Psychiatric: No agitation.    Labs: Personally reviewed and discussed with the patient.  Notable for the following.   Lab Results   Component Value Date    LDLCALC 72 07/07/2025    LDLCALC 119 (H) 06/27/2023    CHOL 154 07/07/2025    CHOL 235 (H) 06/27/2023    TRIG 60 07/07/2025    TRIG 69 06/27/2023    HDL 70 07/07/2025     06/27/2023     07/24/2025    K 4.2 07/24/2025    BUN 18 07/24/2025    CREATININE 0.7 07/24/2025    WBC 5.65 07/24/2025    HGB 13.5 07/24/2025     07/24/2025     Lab Results   Component Value Date    ALT 22 07/24/2025    ALT 22 07/24/2025    AST 23 07/24/2025    AST 23 07/24/2025    ALKPHOS 56 07/24/2025    ALKPHOS 56 07/24/2025    BILITOT 0.5 07/24/2025    BILITOT 0.5 07/24/2025     LP(a) 205 nmol/L    Cardiovascular Studies:    Coronary calcium score, 6/2019: Composite 0.  Aorta unremarkable.  Carotid ultrasound, 2019: Mild plaque bilaterally  Stress echo, 4/2021: No ischemia.  TTE, 6/2024: Normal LV size, wall thickness, LVEF 65 to 70%.  No regional abnormalities.  Normal RV size/function.  Normal LA size.  Mild to moderate aortic stenosis (2.7/15/1.3).  Mild MR.  Mild TR.  Zio, 9/2024: Sinus rhythm, average 78 bpm.  6 beats of NSVT.  1 SVT run of 9 beats.  Symptoms associated with PVCs.  Carotid ultrasound, 12/2024: Mild to moderate plaque with less than 50% stenosis.    EKG: Personally reviewed and discussed with the patient.  It shows sinus rhythm.    Assessment/Plan     Hypercholesterolemia/relative statin intolerance/elevated LP(a):  She has a history of relative statin intolerance.  On Repatha, rosuvastatin, and ezetimibe her LDL is above target given her cardiovascular risk profile.  Furthermore, she does have intermittent muscle aches on her current regimen.  We discussed multiple therapeutic options today and decided to add bempedoic acid with the hope of possibly being able to back off on her statin dose or ezetimibe thereafter depending on her labs.  Potential side effects of the medication were reviewed.  Assuming she tolerates the medication we will repeat labs in approximately 6 weeks.  She was interested in the ongoing clinical trials for LP(a) lowering medications.  I will refer her to the clinical trial team at [facility].    Carotid atherosclerosis:  Mild disease on a recent carotid ultrasound.  Lipid-lowering as below.  We will discuss a surveillance ultrasound in the coming years.    Aortic stenosis:  Will repeat an echocardiogram before her follow-up visit.    Supraventricular tachycardia/palpitations:  She has felt well.  I made no changes to her medication.  I previously spoke to Dr. Muse about her episode of NSVT and no workup was recommended.  She continues to follow with EP.    Family history of premature  coronary artery disease:  This history was reviewed.  She has an elevated LP(a).  She has no anginal symptoms.      It was my pleasure to visit with Kelly Meeks in clinic today. She will follow up in 6 months. Please do not hesitate to contact me with any questions.      Sincerely,       ________________  Ebenezer Torres MD

## 2025-07-31 NOTE — TELEPHONE ENCOUNTER
Patient wanted a paper script,which was printed and placed at the  for patient to .    Called patient left message on machine letting her know that the script is at the  at Dr. Torres's office.

## 2025-07-31 NOTE — TELEPHONE ENCOUNTER
Hi-I prescribed Nexletol.  Can you please work on the prior authorization and follow-up with the patient?  Thank you.

## 2025-07-31 NOTE — TELEPHONE ENCOUNTER
Nexletol approved:    Approved today by [facility] [employer]  CaseId:[id number];Status:Approved;Review Type:Prior Auth;Coverage Start Date:07/01/2025;Coverage End Date:07/31/2026;  Effective Date: 7/1/2025  Authorization Expiration Date: 7/31/2026.    I will call patient to notify her.    Thank you, Lisandra

## 2025-08-04 DIAGNOSIS — E78.41 ELEVATED LP(A): ICD-10-CM

## 2025-08-04 DIAGNOSIS — I65.29 CAROTID ATHEROSCLEROSIS, UNSPECIFIED LATERALITY: ICD-10-CM

## 2025-08-04 DIAGNOSIS — E78.5 DYSLIPIDEMIA: ICD-10-CM

## 2025-08-04 DIAGNOSIS — Z78.9 STATIN INTOLERANCE: ICD-10-CM

## 2025-08-05 RX ORDER — EVOLOCUMAB 140 MG/ML
140 INJECTION, SOLUTION SUBCUTANEOUS
Qty: 6 ML | Refills: 3 | OUTPATIENT
Start: 2025-08-05

## 2025-08-12 ENCOUNTER — HOSPITAL ENCOUNTER (EMERGENCY)
Facility: HOSPITAL | Age: 75
Discharge: HOME | End: 2025-08-12
Attending: EMERGENCY MEDICINE | Admitting: EMERGENCY MEDICINE
Payer: MEDICARE

## 2025-08-12 ENCOUNTER — APPOINTMENT (EMERGENCY)
Dept: RADIOLOGY | Facility: HOSPITAL | Age: 75
End: 2025-08-12
Attending: EMERGENCY MEDICINE
Payer: MEDICARE

## 2025-08-12 VITALS
WEIGHT: 150 LBS | DIASTOLIC BLOOD PRESSURE: 87 MMHG | HEIGHT: 62 IN | TEMPERATURE: 97.6 F | HEART RATE: 68 BPM | RESPIRATION RATE: 18 BRPM | SYSTOLIC BLOOD PRESSURE: 136 MMHG | OXYGEN SATURATION: 96 % | BODY MASS INDEX: 27.6 KG/M2

## 2025-08-12 DIAGNOSIS — M79.605 LEFT LEG PAIN: Primary | ICD-10-CM

## 2025-08-12 PROCEDURE — 93971 EXTREMITY STUDY: CPT | Mod: LT

## 2025-08-12 PROCEDURE — 99284 EMERGENCY DEPT VISIT MOD MDM: CPT | Mod: 25

## 2025-08-12 PROCEDURE — 63700000 HC SELF-ADMINISTRABLE DRUG

## 2025-08-12 RX ORDER — ACETAMINOPHEN 325 MG/1
975 TABLET ORAL ONCE
Status: COMPLETED | OUTPATIENT
Start: 2025-08-12 | End: 2025-08-12

## 2025-08-12 RX ORDER — LIDOCAINE 50 MG/G
1 PATCH TOPICAL DAILY
Qty: 30 PATCH | Refills: 0 | Status: SHIPPED | OUTPATIENT
Start: 2025-08-12 | End: 2025-09-11

## 2025-08-12 RX ORDER — LIDOCAINE 560 MG/1
1 PATCH PERCUTANEOUS; TOPICAL; TRANSDERMAL ONCE
Status: DISCONTINUED | OUTPATIENT
Start: 2025-08-12 | End: 2025-08-12 | Stop reason: HOSPADM

## 2025-08-12 RX ADMIN — ACETAMINOPHEN 975 MG: 325 TABLET ORAL at 18:49

## 2025-08-12 ASSESSMENT — ENCOUNTER SYMPTOMS
NUMBNESS: 0
FEVER: 0
SHORTNESS OF BREATH: 0

## 2025-08-13 ENCOUNTER — TELEPHONE (OUTPATIENT)
Dept: INTERNAL MEDICINE | Facility: CLINIC | Age: 75
End: 2025-08-13
Payer: MEDICARE

## 2025-08-13 DIAGNOSIS — Z79.899 MEDICATION MANAGEMENT: Primary | ICD-10-CM

## 2025-08-13 RX ORDER — TRAMADOL HYDROCHLORIDE 50 MG/1
50 TABLET, FILM COATED ORAL EVERY 6 HOURS PRN
Qty: 30 TABLET | Refills: 3 | Status: SHIPPED | OUTPATIENT
Start: 2025-08-13

## 2025-08-14 ENCOUNTER — HOSPITAL ENCOUNTER (OUTPATIENT)
Dept: RADIOLOGY | Age: 75
Discharge: HOME | End: 2025-08-14
Attending: INTERNAL MEDICINE
Payer: MEDICARE

## 2025-08-14 DIAGNOSIS — Z79.899 MEDICATION MANAGEMENT: ICD-10-CM

## 2025-08-14 PROCEDURE — 73564 X-RAY EXAM KNEE 4 OR MORE: CPT | Mod: LT

## 2025-08-20 ENCOUNTER — TELEPHONE (OUTPATIENT)
Dept: INTERNAL MEDICINE | Facility: CLINIC | Age: 75
End: 2025-08-20
Payer: MEDICARE

## 2025-08-20 DIAGNOSIS — M11.20 PSEUDOGOUT: Primary | ICD-10-CM

## 2025-08-20 RX ORDER — TRAMADOL HYDROCHLORIDE 50 MG/1
100 TABLET, FILM COATED ORAL EVERY 8 HOURS PRN
Qty: 180 TABLET | Refills: 0 | Status: SHIPPED | OUTPATIENT
Start: 2025-08-20 | End: 2025-09-19

## 2025-08-24 ENCOUNTER — HOSPITAL ENCOUNTER (OUTPATIENT)
Dept: RADIOLOGY | Age: 75
Discharge: HOME | End: 2025-08-24
Attending: FAMILY MEDICINE
Payer: MEDICARE

## 2025-08-24 DIAGNOSIS — M54.16 LUMBAR RADICULOPATHY: ICD-10-CM

## 2025-09-05 ENCOUNTER — TELEPHONE (OUTPATIENT)
Dept: SCHEDULING | Facility: CLINIC | Age: 75
End: 2025-09-05
Payer: MEDICARE

## 2025-09-05 DIAGNOSIS — E78.5 DYSLIPIDEMIA: Primary | ICD-10-CM
